# Patient Record
Sex: MALE | Race: WHITE | NOT HISPANIC OR LATINO | Employment: FULL TIME | URBAN - NONMETROPOLITAN AREA
[De-identification: names, ages, dates, MRNs, and addresses within clinical notes are randomized per-mention and may not be internally consistent; named-entity substitution may affect disease eponyms.]

---

## 2021-06-21 ENCOUNTER — OFFICE VISIT (OUTPATIENT)
Dept: FAMILY MEDICINE | Facility: CLINIC | Age: 56
End: 2021-06-21
Payer: OTHER GOVERNMENT

## 2021-06-21 VITALS
HEIGHT: 69 IN | OXYGEN SATURATION: 98 % | BODY MASS INDEX: 33.53 KG/M2 | DIASTOLIC BLOOD PRESSURE: 80 MMHG | TEMPERATURE: 98 F | WEIGHT: 226.38 LBS | SYSTOLIC BLOOD PRESSURE: 120 MMHG | HEART RATE: 88 BPM | RESPIRATION RATE: 16 BRPM

## 2021-06-21 DIAGNOSIS — I10 ESSENTIAL HYPERTENSION, BENIGN: ICD-10-CM

## 2021-06-21 DIAGNOSIS — E78.5 HYPERLIPIDEMIA ASSOCIATED WITH TYPE 2 DIABETES MELLITUS: ICD-10-CM

## 2021-06-21 DIAGNOSIS — Z79.4 TYPE 2 DIABETES MELLITUS WITHOUT COMPLICATION, WITH LONG-TERM CURRENT USE OF INSULIN: Primary | ICD-10-CM

## 2021-06-21 DIAGNOSIS — E11.9 TYPE 2 DIABETES MELLITUS WITHOUT COMPLICATION, WITH LONG-TERM CURRENT USE OF INSULIN: Primary | ICD-10-CM

## 2021-06-21 DIAGNOSIS — E11.69 HYPERLIPIDEMIA ASSOCIATED WITH TYPE 2 DIABETES MELLITUS: ICD-10-CM

## 2021-06-21 LAB
ALBUMIN SERPL BCP-MCNC: 3.5 G/DL (ref 3.5–5)
ALBUMIN/GLOB SERPL: 0.8 {RATIO}
ALP SERPL-CCNC: 88 U/L (ref 45–115)
ALT SERPL W P-5'-P-CCNC: 44 U/L (ref 16–61)
ANION GAP SERPL CALCULATED.3IONS-SCNC: 11 MMOL/L (ref 7–16)
AST SERPL W P-5'-P-CCNC: 24 U/L (ref 15–37)
BASOPHILS # BLD AUTO: 0.03 K/UL (ref 0–0.2)
BASOPHILS NFR BLD AUTO: 0.5 % (ref 0–1)
BILIRUB SERPL-MCNC: 1.2 MG/DL (ref 0–1.2)
BUN SERPL-MCNC: 15 MG/DL (ref 7–18)
BUN/CREAT SERPL: 17 (ref 6–20)
CALCIUM SERPL-MCNC: 8.6 MG/DL (ref 8.5–10.1)
CHLORIDE SERPL-SCNC: 103 MMOL/L (ref 98–107)
CHOLEST SERPL-MCNC: 137 MG/DL (ref 0–200)
CHOLEST/HDLC SERPL: 3.7 {RATIO}
CO2 SERPL-SCNC: 26 MMOL/L (ref 21–32)
CREAT SERPL-MCNC: 0.86 MG/DL (ref 0.7–1.3)
DIFFERENTIAL METHOD BLD: ABNORMAL
EOSINOPHIL # BLD AUTO: 0.06 K/UL (ref 0–0.5)
EOSINOPHIL NFR BLD AUTO: 1 % (ref 1–4)
ERYTHROCYTE [DISTWIDTH] IN BLOOD BY AUTOMATED COUNT: 13.2 % (ref 11.5–14.5)
EST. AVERAGE GLUCOSE BLD GHB EST-MCNC: 250 MG/DL
GLOBULIN SER-MCNC: 4.6 G/DL (ref 2–4)
GLUCOSE SERPL-MCNC: 347 MG/DL (ref 74–106)
HBA1C MFR BLD HPLC: 10.1 % (ref 4.5–6.6)
HCT VFR BLD AUTO: 47.3 % (ref 40–54)
HDLC SERPL-MCNC: 37 MG/DL (ref 40–60)
HGB BLD-MCNC: 15.8 G/DL (ref 13.5–18)
IMM GRANULOCYTES # BLD AUTO: 0.01 K/UL (ref 0–0.04)
IMM GRANULOCYTES NFR BLD: 0.2 % (ref 0–0.4)
LDLC SERPL CALC-MCNC: 62 MG/DL
LDLC/HDLC SERPL: 1.7 {RATIO}
LYMPHOCYTES # BLD AUTO: 1.91 K/UL (ref 1–4.8)
LYMPHOCYTES NFR BLD AUTO: 30.4 % (ref 27–41)
MCH RBC QN AUTO: 29 PG (ref 27–31)
MCHC RBC AUTO-ENTMCNC: 33.4 G/DL (ref 32–36)
MCV RBC AUTO: 86.9 FL (ref 80–96)
MONOCYTES # BLD AUTO: 0.46 K/UL (ref 0–0.8)
MONOCYTES NFR BLD AUTO: 7.3 % (ref 2–6)
MPC BLD CALC-MCNC: 12.9 FL (ref 9.4–12.4)
NEUTROPHILS # BLD AUTO: 3.82 K/UL (ref 1.8–7.7)
NEUTROPHILS NFR BLD AUTO: 60.6 % (ref 53–65)
NONHDLC SERPL-MCNC: 100 MG/DL
NRBC # BLD AUTO: 0 X10E3/UL
NRBC, AUTO (.00): 0 %
PLATELET # BLD AUTO: 162 K/UL (ref 150–400)
POTASSIUM SERPL-SCNC: 3.9 MMOL/L (ref 3.5–5.1)
PROT SERPL-MCNC: 8.1 G/DL (ref 6.4–8.2)
RBC # BLD AUTO: 5.44 M/UL (ref 4.6–6.2)
SODIUM SERPL-SCNC: 136 MMOL/L (ref 136–145)
TRIGL SERPL-MCNC: 191 MG/DL (ref 35–150)
VLDLC SERPL-MCNC: 38 MG/DL
WBC # BLD AUTO: 6.29 K/UL (ref 4.5–11)

## 2021-06-21 PROCEDURE — 99213 OFFICE O/P EST LOW 20 MIN: CPT | Mod: ,,, | Performed by: NURSE PRACTITIONER

## 2021-06-21 PROCEDURE — 85025 COMPLETE CBC W/AUTO DIFF WBC: CPT | Mod: ,,, | Performed by: CLINICAL MEDICAL LABORATORY

## 2021-06-21 PROCEDURE — 85025 CBC WITH DIFFERENTIAL: ICD-10-PCS | Mod: ,,, | Performed by: CLINICAL MEDICAL LABORATORY

## 2021-06-21 PROCEDURE — 80053 COMPREHEN METABOLIC PANEL: CPT | Mod: ,,, | Performed by: CLINICAL MEDICAL LABORATORY

## 2021-06-21 PROCEDURE — 83036 HEMOGLOBIN GLYCOSYLATED A1C: CPT | Mod: ,,, | Performed by: CLINICAL MEDICAL LABORATORY

## 2021-06-21 PROCEDURE — 80061 LIPID PANEL: ICD-10-PCS | Mod: ,,, | Performed by: CLINICAL MEDICAL LABORATORY

## 2021-06-21 PROCEDURE — 83036 HEMOGLOBIN A1C: ICD-10-PCS | Mod: ,,, | Performed by: CLINICAL MEDICAL LABORATORY

## 2021-06-21 PROCEDURE — 99213 PR OFFICE/OUTPT VISIT, EST, LEVL III, 20-29 MIN: ICD-10-PCS | Mod: ,,, | Performed by: NURSE PRACTITIONER

## 2021-06-21 PROCEDURE — 80053 COMPREHENSIVE METABOLIC PANEL: ICD-10-PCS | Mod: ,,, | Performed by: CLINICAL MEDICAL LABORATORY

## 2021-06-21 PROCEDURE — 80061 LIPID PANEL: CPT | Mod: ,,, | Performed by: CLINICAL MEDICAL LABORATORY

## 2021-06-21 RX ORDER — BACLOFEN 20 MG/1
20 TABLET ORAL 3 TIMES DAILY
COMMUNITY
Start: 2021-03-15 | End: 2022-06-27

## 2021-06-21 RX ORDER — AMLODIPINE BESYLATE 10 MG/1
0.5 TABLET ORAL DAILY
COMMUNITY
End: 2022-06-15 | Stop reason: SDUPTHER

## 2021-06-21 RX ORDER — ATORVASTATIN CALCIUM 80 MG/1
80 TABLET, FILM COATED ORAL DAILY
COMMUNITY
End: 2022-06-15 | Stop reason: SDUPTHER

## 2021-06-21 RX ORDER — LISINOPRIL 40 MG/1
40 TABLET ORAL DAILY
COMMUNITY
End: 2022-06-15 | Stop reason: SDUPTHER

## 2021-06-21 RX ORDER — GLIMEPIRIDE 4 MG/1
2 TABLET ORAL DAILY
COMMUNITY
Start: 2021-04-27 | End: 2021-06-21 | Stop reason: SDUPTHER

## 2021-06-21 RX ORDER — GLIMEPIRIDE 4 MG/1
8 TABLET ORAL DAILY
Qty: 90 TABLET | Refills: 1 | Status: SHIPPED | OUTPATIENT
Start: 2021-06-21 | End: 2021-09-20 | Stop reason: SDUPTHER

## 2021-06-21 RX ORDER — INSULIN GLARGINE 100 [IU]/ML
45 INJECTION, SOLUTION SUBCUTANEOUS NIGHTLY
COMMUNITY
End: 2022-06-15 | Stop reason: SDUPTHER

## 2021-06-21 RX ORDER — NAPROXEN SODIUM 220 MG/1
81 TABLET, FILM COATED ORAL DAILY
COMMUNITY
End: 2022-06-15 | Stop reason: SDUPTHER

## 2021-06-21 RX ORDER — OMEPRAZOLE 20 MG/1
20 CAPSULE, DELAYED RELEASE ORAL DAILY
COMMUNITY
End: 2022-06-15 | Stop reason: SDUPTHER

## 2021-06-21 RX ORDER — VIT C/E/ZN/COPPR/LUTEIN/ZEAXAN 250MG-90MG
1000 CAPSULE ORAL DAILY
COMMUNITY
End: 2022-06-15 | Stop reason: SDUPTHER

## 2021-06-21 RX ORDER — LEVOTHYROXINE SODIUM 200 UG/1
200 TABLET ORAL
COMMUNITY
End: 2022-06-15 | Stop reason: SDUPTHER

## 2021-06-21 RX ORDER — DICLOFENAC SODIUM 50 MG/1
50 TABLET, DELAYED RELEASE ORAL 2 TIMES DAILY PRN
COMMUNITY
Start: 2021-03-15 | End: 2022-06-27

## 2021-06-21 RX ORDER — OMEGA-3-ACID ETHYL ESTERS 1 G/1
2 CAPSULE, LIQUID FILLED ORAL 2 TIMES DAILY
COMMUNITY
End: 2022-06-15 | Stop reason: SDUPTHER

## 2021-06-21 RX ORDER — TRAMADOL HYDROCHLORIDE 50 MG/1
50 TABLET ORAL DAILY
COMMUNITY
Start: 2021-04-21 | End: 2022-06-27

## 2021-06-21 RX ORDER — LANCETS 33 GAUGE
EACH MISCELLANEOUS
COMMUNITY
End: 2022-09-12 | Stop reason: SDUPTHER

## 2021-06-21 RX ORDER — GABAPENTIN 300 MG/1
600 CAPSULE ORAL 3 TIMES DAILY
COMMUNITY
End: 2022-06-15 | Stop reason: SDUPTHER

## 2021-08-16 ENCOUNTER — OFFICE VISIT (OUTPATIENT)
Dept: FAMILY MEDICINE | Facility: CLINIC | Age: 56
End: 2021-08-16
Payer: OTHER GOVERNMENT

## 2021-08-16 VITALS
HEART RATE: 66 BPM | HEIGHT: 69 IN | RESPIRATION RATE: 20 BRPM | DIASTOLIC BLOOD PRESSURE: 88 MMHG | WEIGHT: 228.38 LBS | SYSTOLIC BLOOD PRESSURE: 128 MMHG | BODY MASS INDEX: 33.83 KG/M2 | OXYGEN SATURATION: 98 % | TEMPERATURE: 97 F

## 2021-08-16 DIAGNOSIS — H61.20 IMPACTED CERUMEN, UNSPECIFIED LATERALITY: Primary | ICD-10-CM

## 2021-08-16 PROCEDURE — 99213 OFFICE O/P EST LOW 20 MIN: CPT | Mod: ,,, | Performed by: FAMILY MEDICINE

## 2021-08-16 PROCEDURE — 99213 PR OFFICE/OUTPT VISIT, EST, LEVL III, 20-29 MIN: ICD-10-PCS | Mod: ,,, | Performed by: FAMILY MEDICINE

## 2021-08-22 PROBLEM — H61.20 IMPACTED CERUMEN: Status: ACTIVE | Noted: 2021-08-22

## 2021-09-20 ENCOUNTER — OFFICE VISIT (OUTPATIENT)
Dept: FAMILY MEDICINE | Facility: CLINIC | Age: 56
End: 2021-09-20
Payer: OTHER GOVERNMENT

## 2021-09-20 VITALS
HEART RATE: 66 BPM | TEMPERATURE: 97 F | DIASTOLIC BLOOD PRESSURE: 80 MMHG | HEIGHT: 69 IN | RESPIRATION RATE: 18 BRPM | OXYGEN SATURATION: 96 % | SYSTOLIC BLOOD PRESSURE: 110 MMHG | WEIGHT: 227.19 LBS | BODY MASS INDEX: 33.65 KG/M2

## 2021-09-20 DIAGNOSIS — R06.02 SHORTNESS OF BREATH: ICD-10-CM

## 2021-09-20 DIAGNOSIS — I10 ESSENTIAL HYPERTENSION, BENIGN: ICD-10-CM

## 2021-09-20 DIAGNOSIS — E78.5 HYPERLIPIDEMIA ASSOCIATED WITH TYPE 2 DIABETES MELLITUS: ICD-10-CM

## 2021-09-20 DIAGNOSIS — E11.9 TYPE 2 DIABETES MELLITUS WITHOUT COMPLICATION, WITH LONG-TERM CURRENT USE OF INSULIN: Primary | ICD-10-CM

## 2021-09-20 DIAGNOSIS — Z12.11 SCREENING FOR COLON CANCER: ICD-10-CM

## 2021-09-20 DIAGNOSIS — E11.69 HYPERLIPIDEMIA ASSOCIATED WITH TYPE 2 DIABETES MELLITUS: ICD-10-CM

## 2021-09-20 DIAGNOSIS — E03.9 HYPOTHYROIDISM, UNSPECIFIED TYPE: ICD-10-CM

## 2021-09-20 DIAGNOSIS — Z79.4 TYPE 2 DIABETES MELLITUS WITHOUT COMPLICATION, WITH LONG-TERM CURRENT USE OF INSULIN: Primary | ICD-10-CM

## 2021-09-20 LAB
BASOPHILS # BLD AUTO: 0.03 K/UL (ref 0–0.2)
BASOPHILS NFR BLD AUTO: 0.6 % (ref 0–1)
DIFFERENTIAL METHOD BLD: ABNORMAL
EOSINOPHIL # BLD AUTO: 0.06 K/UL (ref 0–0.5)
EOSINOPHIL NFR BLD AUTO: 1.1 % (ref 1–4)
ERYTHROCYTE [DISTWIDTH] IN BLOOD BY AUTOMATED COUNT: 12.8 % (ref 11.5–14.5)
EST. AVERAGE GLUCOSE BLD GHB EST-MCNC: 274 MG/DL
HBA1C MFR BLD HPLC: 10.8 % (ref 4.5–6.6)
HCT VFR BLD AUTO: 41 % (ref 40–54)
HGB BLD-MCNC: 14.2 G/DL (ref 13.5–18)
IMM GRANULOCYTES # BLD AUTO: 0.01 K/UL (ref 0–0.04)
IMM GRANULOCYTES NFR BLD: 0.2 % (ref 0–0.4)
LYMPHOCYTES # BLD AUTO: 1.8 K/UL (ref 1–4.8)
LYMPHOCYTES NFR BLD AUTO: 33.1 % (ref 27–41)
MCH RBC QN AUTO: 29.5 PG (ref 27–31)
MCHC RBC AUTO-ENTMCNC: 34.6 G/DL (ref 32–36)
MCV RBC AUTO: 85.1 FL (ref 80–96)
MONOCYTES # BLD AUTO: 0.53 K/UL (ref 0–0.8)
MONOCYTES NFR BLD AUTO: 9.8 % (ref 2–6)
MPC BLD CALC-MCNC: 13.2 FL (ref 9.4–12.4)
NEUTROPHILS # BLD AUTO: 3 K/UL (ref 1.8–7.7)
NEUTROPHILS NFR BLD AUTO: 55.2 % (ref 53–65)
NRBC # BLD AUTO: 0 X10E3/UL
NRBC, AUTO (.00): 0 %
PLATELET # BLD AUTO: 139 K/UL (ref 150–400)
PLATELET MORPHOLOGY: ABNORMAL
RBC # BLD AUTO: 4.82 M/UL (ref 4.6–6.2)
RBC MORPH BLD: NORMAL
WBC # BLD AUTO: 5.43 K/UL (ref 4.5–11)

## 2021-09-20 PROCEDURE — 82043 UR ALBUMIN QUANTITATIVE: CPT | Mod: ,,, | Performed by: CLINICAL MEDICAL LABORATORY

## 2021-09-20 PROCEDURE — 80061 LIPID PANEL: CPT | Mod: ,,, | Performed by: CLINICAL MEDICAL LABORATORY

## 2021-09-20 PROCEDURE — 93000 PR ELECTROCARDIOGRAM, COMPLETE: ICD-10-PCS | Mod: ,,, | Performed by: NURSE PRACTITIONER

## 2021-09-20 PROCEDURE — 93000 ELECTROCARDIOGRAM COMPLETE: CPT | Mod: ,,, | Performed by: NURSE PRACTITIONER

## 2021-09-20 PROCEDURE — 83036 HEMOGLOBIN GLYCOSYLATED A1C: CPT | Mod: ,,, | Performed by: CLINICAL MEDICAL LABORATORY

## 2021-09-20 PROCEDURE — 84443 TSH: ICD-10-PCS | Mod: ,,, | Performed by: CLINICAL MEDICAL LABORATORY

## 2021-09-20 PROCEDURE — 85025 COMPLETE CBC W/AUTO DIFF WBC: CPT | Mod: ,,, | Performed by: CLINICAL MEDICAL LABORATORY

## 2021-09-20 PROCEDURE — 80053 COMPREHEN METABOLIC PANEL: CPT | Mod: ,,, | Performed by: CLINICAL MEDICAL LABORATORY

## 2021-09-20 PROCEDURE — 80061 LIPID PANEL: ICD-10-PCS | Mod: ,,, | Performed by: CLINICAL MEDICAL LABORATORY

## 2021-09-20 PROCEDURE — 85025 CBC WITH DIFFERENTIAL: ICD-10-PCS | Mod: ,,, | Performed by: CLINICAL MEDICAL LABORATORY

## 2021-09-20 PROCEDURE — 80053 COMPREHENSIVE METABOLIC PANEL: ICD-10-PCS | Mod: ,,, | Performed by: CLINICAL MEDICAL LABORATORY

## 2021-09-20 PROCEDURE — 82570 ASSAY OF URINE CREATININE: CPT | Mod: ,,, | Performed by: CLINICAL MEDICAL LABORATORY

## 2021-09-20 PROCEDURE — 99214 OFFICE O/P EST MOD 30 MIN: CPT | Mod: 25,,, | Performed by: NURSE PRACTITIONER

## 2021-09-20 PROCEDURE — 84443 ASSAY THYROID STIM HORMONE: CPT | Mod: ,,, | Performed by: CLINICAL MEDICAL LABORATORY

## 2021-09-20 PROCEDURE — 99214 PR OFFICE/OUTPT VISIT, EST, LEVL IV, 30-39 MIN: ICD-10-PCS | Mod: 25,,, | Performed by: NURSE PRACTITIONER

## 2021-09-20 PROCEDURE — 82570 MICROALBUMIN / CREATININE RATIO URINE: ICD-10-PCS | Mod: ,,, | Performed by: CLINICAL MEDICAL LABORATORY

## 2021-09-20 PROCEDURE — 82043 MICROALBUMIN / CREATININE RATIO URINE: ICD-10-PCS | Mod: ,,, | Performed by: CLINICAL MEDICAL LABORATORY

## 2021-09-20 PROCEDURE — 83036 HEMOGLOBIN A1C: ICD-10-PCS | Mod: ,,, | Performed by: CLINICAL MEDICAL LABORATORY

## 2021-09-20 RX ORDER — GLIMEPIRIDE 4 MG/1
8 TABLET ORAL DAILY
Qty: 90 TABLET | Refills: 1 | Status: SHIPPED | OUTPATIENT
Start: 2021-09-20 | End: 2021-10-11

## 2021-09-21 LAB
ALBUMIN SERPL BCP-MCNC: 3.3 G/DL (ref 3.5–5)
ALBUMIN/GLOB SERPL: 0.8 {RATIO}
ALP SERPL-CCNC: 79 U/L (ref 45–115)
ALT SERPL W P-5'-P-CCNC: 48 U/L (ref 16–61)
ANION GAP SERPL CALCULATED.3IONS-SCNC: 9 MMOL/L (ref 7–16)
AST SERPL W P-5'-P-CCNC: 39 U/L (ref 15–37)
BILIRUB SERPL-MCNC: 1.7 MG/DL (ref 0–1.2)
BUN SERPL-MCNC: 21 MG/DL (ref 7–18)
BUN/CREAT SERPL: 28 (ref 6–20)
CALCIUM SERPL-MCNC: 9.2 MG/DL (ref 8.5–10.1)
CHLORIDE SERPL-SCNC: 103 MMOL/L (ref 98–107)
CHOLEST SERPL-MCNC: 91 MG/DL (ref 0–200)
CHOLEST/HDLC SERPL: 2.5 {RATIO}
CO2 SERPL-SCNC: 28 MMOL/L (ref 21–32)
CREAT SERPL-MCNC: 0.75 MG/DL (ref 0.7–1.3)
GLOBULIN SER-MCNC: 4 G/DL (ref 2–4)
GLUCOSE SERPL-MCNC: 230 MG/DL (ref 74–106)
HDLC SERPL-MCNC: 36 MG/DL (ref 40–60)
LDLC SERPL CALC-MCNC: 40 MG/DL
LDLC/HDLC SERPL: 1.1 {RATIO}
NONHDLC SERPL-MCNC: 55 MG/DL
POTASSIUM SERPL-SCNC: 3.7 MMOL/L (ref 3.5–5.1)
PROT SERPL-MCNC: 7.3 G/DL (ref 6.4–8.2)
SODIUM SERPL-SCNC: 136 MMOL/L (ref 136–145)
TRIGL SERPL-MCNC: 76 MG/DL (ref 35–150)
TSH SERPL DL<=0.005 MIU/L-ACNC: 0.24 UIU/ML (ref 0.36–3.74)
VLDLC SERPL-MCNC: 15 MG/DL

## 2021-09-22 LAB
CREAT UR-MCNC: 138 MG/DL (ref 39–259)
MICROALBUMIN UR-MCNC: 1.6 MG/DL (ref 0–2.8)
MICROALBUMIN/CREAT RATIO PNL UR: 11.6 MG/G (ref 0–30)

## 2021-09-27 RX ORDER — EMPAGLIFLOZIN 25 MG/1
25 TABLET, FILM COATED ORAL DAILY
Qty: 30 TABLET | Refills: 2 | Status: SHIPPED | OUTPATIENT
Start: 2021-09-27 | End: 2022-06-15 | Stop reason: SDUPTHER

## 2021-10-08 DIAGNOSIS — Z79.4 TYPE 2 DIABETES MELLITUS WITHOUT COMPLICATION, WITH LONG-TERM CURRENT USE OF INSULIN: ICD-10-CM

## 2021-10-08 DIAGNOSIS — E11.9 TYPE 2 DIABETES MELLITUS WITHOUT COMPLICATION, WITH LONG-TERM CURRENT USE OF INSULIN: ICD-10-CM

## 2021-10-11 RX ORDER — GLIMEPIRIDE 4 MG/1
4 TABLET ORAL
Qty: 90 TABLET | Refills: 0 | Status: SHIPPED | OUTPATIENT
Start: 2021-10-11 | End: 2021-10-11 | Stop reason: SDUPTHER

## 2021-10-11 RX ORDER — GLIMEPIRIDE 4 MG/1
8 TABLET ORAL
Qty: 180 TABLET | Refills: 0 | Status: SHIPPED | OUTPATIENT
Start: 2021-10-11 | End: 2021-12-28

## 2021-12-28 DIAGNOSIS — E11.9 TYPE 2 DIABETES MELLITUS WITHOUT COMPLICATION, WITH LONG-TERM CURRENT USE OF INSULIN: ICD-10-CM

## 2021-12-28 DIAGNOSIS — Z79.4 TYPE 2 DIABETES MELLITUS WITHOUT COMPLICATION, WITH LONG-TERM CURRENT USE OF INSULIN: ICD-10-CM

## 2021-12-28 RX ORDER — GLIMEPIRIDE 4 MG/1
TABLET ORAL
Qty: 180 TABLET | Refills: 0 | Status: SHIPPED | OUTPATIENT
Start: 2021-12-28 | End: 2022-03-17

## 2022-03-07 ENCOUNTER — OFFICE VISIT (OUTPATIENT)
Dept: FAMILY MEDICINE | Facility: CLINIC | Age: 57
End: 2022-03-07
Payer: OTHER GOVERNMENT

## 2022-03-07 VITALS
OXYGEN SATURATION: 96 % | HEIGHT: 69 IN | SYSTOLIC BLOOD PRESSURE: 110 MMHG | WEIGHT: 227 LBS | TEMPERATURE: 97 F | BODY MASS INDEX: 33.62 KG/M2 | DIASTOLIC BLOOD PRESSURE: 60 MMHG | HEART RATE: 92 BPM | RESPIRATION RATE: 20 BRPM

## 2022-03-07 DIAGNOSIS — R11.2 NAUSEA AND VOMITING, INTRACTABILITY OF VOMITING NOT SPECIFIED, UNSPECIFIED VOMITING TYPE: ICD-10-CM

## 2022-03-07 DIAGNOSIS — K52.9 GASTROENTERITIS: Primary | ICD-10-CM

## 2022-03-07 DIAGNOSIS — R19.7 DIARRHEA, UNSPECIFIED TYPE: ICD-10-CM

## 2022-03-07 LAB
CTP QC/QA: YES
FLUAV AG NPH QL: NEGATIVE
FLUBV AG NPH QL: NEGATIVE
SARS-COV-2 AG RESP QL IA.RAPID: NEGATIVE

## 2022-03-07 PROCEDURE — 87428 POCT SARS-COV2 (COVID) WITH FLU ANTIGEN: ICD-10-PCS | Mod: QW,,, | Performed by: NURSE PRACTITIONER

## 2022-03-07 PROCEDURE — 99213 PR OFFICE/OUTPT VISIT, EST, LEVL III, 20-29 MIN: ICD-10-PCS | Mod: ,,, | Performed by: NURSE PRACTITIONER

## 2022-03-07 PROCEDURE — 99213 OFFICE O/P EST LOW 20 MIN: CPT | Mod: ,,, | Performed by: NURSE PRACTITIONER

## 2022-03-07 PROCEDURE — 87428 SARSCOV & INF VIR A&B AG IA: CPT | Mod: QW,,, | Performed by: NURSE PRACTITIONER

## 2022-03-07 RX ORDER — ONDANSETRON 4 MG/1
4 TABLET, ORALLY DISINTEGRATING ORAL EVERY 8 HOURS PRN
Qty: 9 TABLET | Refills: 0 | Status: SHIPPED | OUTPATIENT
Start: 2022-03-07 | End: 2022-06-15

## 2022-03-07 RX ORDER — DIPHENOXYLATE HYDROCHLORIDE AND ATROPINE SULFATE 2.5; .025 MG/1; MG/1
1 TABLET ORAL 4 TIMES DAILY PRN
Qty: 9 TABLET | Refills: 0 | Status: SHIPPED | OUTPATIENT
Start: 2022-03-07 | End: 2022-03-17

## 2022-03-07 RX ORDER — CIPROFLOXACIN 500 MG/1
500 TABLET ORAL EVERY 12 HOURS
Qty: 14 TABLET | Refills: 0 | Status: SHIPPED | OUTPATIENT
Start: 2022-03-07 | End: 2022-06-15

## 2022-03-07 NOTE — LETTER
March 7, 2022      [facility]  [address]  [place] [address]  Phone: [phone]  Fax: [phone]       Patient: James Mcclellan   YOB: 1965  Date of Visit: 03/07/2022    To Whom It May Concern:    Solange Mcclellan  was at [facility] on 03/04/2022. The patient may return to work/school on 03/09/2022 with no restrictions. If you have any questions or concerns, or if I can be of further assistance, please do not hesitate to contact me.      Sincerely,    ANDREA Vilchis

## 2022-03-07 NOTE — PROGRESS NOTES
ANDREA Sommers   [facility]  [address]  [place] [address]     PATIENT NAME: James Mcclellan  : 1965  DATE: 3/7/22  MRN: [id number]      Billing Provider: ANDREA Sommers  Level of Service: [place] OFFICE/OUTPT VISIT, EST, LEVL III, 20-29 MIN  Patient PCP Information     Provider PCP Type    ANDREA Sommers General          Reason for Visit / Chief Complaint: Diarrhea (Started last night.), Nausea (Nausea and vomiting started Saturday night), and Headache (Started last week.  Denies fever or loss of taste or smell.)       Update PCP  Update Chief Complaint         History of Present Illness / Problem Focused Workflow     James Mcclellan presents to the clinic c/o N/V/D and headache that started 4-5 days ago. Denies fever, chills, muscle aches or SOB. Sat. night he ate left over chicken fried steak and gravy and symptoms started later that night.  Has been drinking gatorade zero sugar but has not checked blood sugar; hx of DM.      Review of Systems     Review of Systems   Constitutional: Positive for appetite change. Negative for fever.   HENT: Negative for sore throat.    Respiratory: Negative for shortness of breath.    Cardiovascular: Negative for chest pain.   Gastrointestinal: Positive for diarrhea, nausea and vomiting. Negative for abdominal pain and blood in stool.   Endocrine: Negative for polydipsia, polyphagia and polyuria.   Musculoskeletal: Negative for back pain and myalgias.   Neurological: Negative for weakness and headaches.        Medical / Social / Family History     Past Medical History:   Diagnosis Date    Bell's palsy 10/15/2018    Left Side of Face    Degenerative cervical disc     GERD (gastroesophageal reflux disease)     History of COVID-19 2021    Hypertension     Hypothyroidism     Neck pain     Type 2 diabetes mellitus        Past Surgical History:   Procedure Laterality Date    ANTERIOR CRUCIATE LIGAMENT REPAIR       CHOLECYSTECTOMY      SINUS SURGERY         Social History    reports that he has never smoked. He has never used smokeless tobacco. He reports that he does not drink alcohol and does not use drugs.    Family History  MrTeresa's family history includes Diabetes in his father and sister; Heart disease in his father; Hypertension in his brother, father, mother, and sister.    Medications and Allergies     Medications  Outpatient Medications Marked as Taking for the 3/7/22 encounter (Office Visit) with NADREA Vilchis   Medication Sig Dispense Refill    amLODIPine (NORVASC) 10 MG tablet Take 0.5 mg by mouth once daily.      aspirin 81 MG Chew Take 81 mg by mouth once daily.      atorvastatin (LIPITOR) 80 MG tablet Take 80 mg by mouth once daily.      baclofen (LIORESAL) 20 MG tablet Take 20 mg by mouth 3 (three) times daily.      cholecalciferol, vitamin D3, (VITAMIN D3) 25 mcg (1,000 unit) capsule Take 1,000 Units by mouth once daily.      diclofenac (VOLTAREN) 50 MG EC tablet Take 50 mg by mouth 2 (two) times daily as needed.      empagliflozin (JARDIANCE) 25 mg tablet Take 1 tablet (25 mg total) by mouth once daily. 30 tablet 2    gabapentin (NEURONTIN) 300 MG capsule Take 600 mg by mouth 3 (three) times daily.      glimepiride (AMARYL) 4 MG tablet TAKE 2 TABLETS DAILY WITH BREAKFAST 180 tablet 0    insulin (LANTUS SOLOSTAR U-100 INSULIN) glargine 100 units/mL (3mL) SubQ pen Inject 45 Units into the skin every evening.       lancets 33 gauge Misc Use as directed to check blood glucose      levothyroxine (SYNTHROID) 200 MCG tablet Take 200 mcg by mouth before breakfast.      lisinopriL (PRINIVIL,ZESTRIL) 40 MG tablet Take 40 mg by mouth once daily.      omega-3 acid ethyl esters (LOVAZA) 1 gram capsule Take 2 g by mouth 2 (two) times daily.      omeprazole (PRILOSEC) 20 MG capsule Take 20 mg by mouth once daily.      traMADoL (ULTRAM) 50 mg tablet Take 50 mg by mouth once daily.    "      Allergies  Review of patient's allergies indicates:  No Known Allergies    Physical Examination     Vitals:    03/07/22 1312   BP: 110/60   BP Location: Right arm   Patient Position: Sitting   BP Method: Large (Manual)   Pulse: 92   Resp: 20   Temp: 96.8 °F (36 °C)   TempSrc: Temporal   SpO2: 96%   Weight: 103 kg (227 lb)   Height: 5' 9" (1.753 m)      Physical Exam  Constitutional:       Appearance: Normal appearance.      Comments: Appears to not feel well   HENT:      Head: Normocephalic.      Right Ear: Hearing and ear canal normal. No tenderness. Tympanic membrane is not injected.      Left Ear: Hearing and ear canal normal. No tenderness. Tympanic membrane is not injected.      Nose: No nasal deformity.      Right Turbinates: Not swollen.      Left Turbinates: Not swollen.      Mouth/Throat:      Lips: Pink.      Mouth: Mucous membranes are moist.      Pharynx: No oropharyngeal exudate or posterior oropharyngeal erythema.      Tonsils: No tonsillar exudate.   Eyes:      General: Lids are normal. No allergic shiner.        Right eye: No discharge.         Left eye: No discharge.      Conjunctiva/sclera: Conjunctivae normal.      Right eye: Right conjunctiva is not injected.      Left eye: Left conjunctiva is not injected.      Pupils: Pupils are equal, round, and reactive to light.   Neck:      Trachea: Trachea normal.   Cardiovascular:      Rate and Rhythm: Normal rate and regular rhythm.      Pulses: Normal pulses.      Heart sounds: Normal heart sounds.   Pulmonary:      Effort: Pulmonary effort is normal.      Breath sounds: Normal breath sounds.   Abdominal:      General: Bowel sounds are increased.      Palpations: Abdomen is soft. There is no mass.      Tenderness: There is no abdominal tenderness. There is no guarding or rebound.   Musculoskeletal:         General: Normal range of motion.      Cervical back: Neck supple.   Lymphadenopathy:      Cervical: No cervical adenopathy.   Skin:     " General: Skin is warm and dry.      Coloration: Skin is not jaundiced or pale.   Neurological:      Mental Status: He is alert and oriented to person, place, and time.   Psychiatric:         Behavior: Behavior normal.          Assessment and Plan (including Health Maintenance)      Problem List  Smart Sets  Document Outside HM   :      Health Maintenance Due   Topic Date Due    Hepatitis C Screening  Never done    TETANUS VACCINE  Never done    Colorectal Cancer Screening  Never done    Eye Exam  01/05/2016    Hemoglobin A1c  12/20/2021       Problem List Items Addressed This Visit    None     Visit Diagnoses     Gastroenteritis    -  Primary    Proably D/T food; continue clear liquids and will treat with cipro, zofran as needed and lomotil as needed. Check blood sugar daily. Increase fluids    Nausea and vomiting, intractability of vomiting not specified, unspecified vomiting type        COVID and influenza negative    Relevant Orders    POCT SARS-COV2 (COVID) with Flu Antigen (Completed)    Diarrhea, unspecified type        Relevant Orders    POCT SARS-COV2 (COVID) with Flu Antigen (Completed)        Gastroenteritis  Comments:  Proably D/T food; continue clear liquids and will treat with cipro, zofran as needed and lomotil as needed. Check blood sugar daily. Increase fluids    Nausea and vomiting, intractability of vomiting not specified, unspecified vomiting type  Comments:  COVID and influenza negative  Orders:  -     POCT SARS-COV2 (COVID) with Flu Antigen    Diarrhea, unspecified type  -     POCT SARS-COV2 (COVID) with Flu Antigen    Other orders  -     ciprofloxacin HCl (CIPRO) 500 MG tablet; Take 1 tablet (500 mg total) by mouth every 12 (twelve) hours.  Dispense: 14 tablet; Refill: 0  -     ondansetron (ZOFRAN-ODT) 4 MG TbDL; Take 1 tablet (4 mg total) by mouth every 8 (eight) hours as needed (nausea/vomiting).  Dispense: 9 tablet; Refill: 0  -     diphenoxylate-atropine 2.5-0.025 mg (LOMOTIL)  2.5-0.025 mg per tablet; Take 1 tablet by mouth 4 (four) times daily as needed for Diarrhea.  Dispense: 9 tablet; Refill: 0       Health Maintenance Topics with due status: Not Due       Topic Last Completion Date    Pneumococcal Vaccines (Age 0-64) 11/03/2014    Low Dose Statin 09/20/2021    Diabetes Urine Screening 09/20/2021    Foot Exam 09/20/2021    Lipid Panel 09/20/2021       Procedures          Follow up if symptoms worsen or fail to improve in 48 hrs or go to ER.     Signature:  ANDREA Sommers    Date of encounter: 3/7/22

## 2022-03-16 DIAGNOSIS — Z79.4 TYPE 2 DIABETES MELLITUS WITHOUT COMPLICATION, WITH LONG-TERM CURRENT USE OF INSULIN: ICD-10-CM

## 2022-03-16 DIAGNOSIS — E11.9 TYPE 2 DIABETES MELLITUS WITHOUT COMPLICATION, WITH LONG-TERM CURRENT USE OF INSULIN: ICD-10-CM

## 2022-03-17 RX ORDER — GLIMEPIRIDE 4 MG/1
TABLET ORAL
Qty: 180 TABLET | Refills: 0 | Status: SHIPPED | OUTPATIENT
Start: 2022-03-17 | End: 2022-06-13

## 2022-06-11 DIAGNOSIS — E11.9 TYPE 2 DIABETES MELLITUS WITHOUT COMPLICATION, WITH LONG-TERM CURRENT USE OF INSULIN: ICD-10-CM

## 2022-06-11 DIAGNOSIS — Z79.4 TYPE 2 DIABETES MELLITUS WITHOUT COMPLICATION, WITH LONG-TERM CURRENT USE OF INSULIN: ICD-10-CM

## 2022-06-13 RX ORDER — GLIMEPIRIDE 4 MG/1
TABLET ORAL
Qty: 180 TABLET | Refills: 0 | Status: SHIPPED | OUTPATIENT
Start: 2022-06-13 | End: 2022-06-15 | Stop reason: SDUPTHER

## 2022-06-15 ENCOUNTER — OFFICE VISIT (OUTPATIENT)
Dept: FAMILY MEDICINE | Facility: CLINIC | Age: 57
End: 2022-06-15
Payer: OTHER GOVERNMENT

## 2022-06-15 VITALS
DIASTOLIC BLOOD PRESSURE: 62 MMHG | HEART RATE: 68 BPM | TEMPERATURE: 98 F | RESPIRATION RATE: 20 BRPM | WEIGHT: 218.81 LBS | OXYGEN SATURATION: 98 % | BODY MASS INDEX: 32.41 KG/M2 | HEIGHT: 69 IN | SYSTOLIC BLOOD PRESSURE: 120 MMHG

## 2022-06-15 DIAGNOSIS — Z79.4 TYPE 2 DIABETES MELLITUS WITH DIABETIC POLYNEUROPATHY, WITH LONG-TERM CURRENT USE OF INSULIN: ICD-10-CM

## 2022-06-15 DIAGNOSIS — E03.9 HYPOTHYROIDISM, UNSPECIFIED TYPE: ICD-10-CM

## 2022-06-15 DIAGNOSIS — E11.69 HYPERLIPIDEMIA ASSOCIATED WITH TYPE 2 DIABETES MELLITUS: ICD-10-CM

## 2022-06-15 DIAGNOSIS — I10 ESSENTIAL HYPERTENSION, BENIGN: Primary | ICD-10-CM

## 2022-06-15 DIAGNOSIS — E11.42 TYPE 2 DIABETES MELLITUS WITH DIABETIC POLYNEUROPATHY, WITH LONG-TERM CURRENT USE OF INSULIN: ICD-10-CM

## 2022-06-15 DIAGNOSIS — E55.9 VITAMIN D DEFICIENCY: ICD-10-CM

## 2022-06-15 DIAGNOSIS — E78.5 HYPERLIPIDEMIA ASSOCIATED WITH TYPE 2 DIABETES MELLITUS: ICD-10-CM

## 2022-06-15 DIAGNOSIS — Z12.11 SCREENING FOR COLON CANCER: ICD-10-CM

## 2022-06-15 DIAGNOSIS — K21.9 GASTROESOPHAGEAL REFLUX DISEASE, UNSPECIFIED WHETHER ESOPHAGITIS PRESENT: ICD-10-CM

## 2022-06-15 DIAGNOSIS — M77.32 CALCANEAL SPUR OF LEFT FOOT: ICD-10-CM

## 2022-06-15 PROBLEM — M77.30 CALCANEAL SPUR: Status: ACTIVE | Noted: 2022-06-15

## 2022-06-15 PROBLEM — G47.33 OSA (OBSTRUCTIVE SLEEP APNEA): Status: ACTIVE | Noted: 2022-06-15

## 2022-06-15 PROBLEM — H61.20 IMPACTED CERUMEN: Status: RESOLVED | Noted: 2021-08-22 | Resolved: 2022-06-15

## 2022-06-15 PROBLEM — G51.0 BELL'S PALSY: Status: RESOLVED | Noted: 2018-10-15 | Resolved: 2022-06-15

## 2022-06-15 LAB
ALBUMIN SERPL BCP-MCNC: 3.4 G/DL (ref 3.5–5)
ALBUMIN/GLOB SERPL: 0.9 {RATIO}
ALP SERPL-CCNC: 81 U/L (ref 45–115)
ALT SERPL W P-5'-P-CCNC: 43 U/L (ref 16–61)
ANION GAP SERPL CALCULATED.3IONS-SCNC: 10 MMOL/L (ref 7–16)
AST SERPL W P-5'-P-CCNC: 27 U/L (ref 15–37)
BASOPHILS # BLD AUTO: 0.03 K/UL (ref 0–0.2)
BASOPHILS NFR BLD AUTO: 0.5 % (ref 0–1)
BILIRUB SERPL-MCNC: 2.3 MG/DL (ref 0–1.2)
BUN SERPL-MCNC: 9 MG/DL (ref 7–18)
BUN/CREAT SERPL: 13 (ref 6–20)
CALCIUM SERPL-MCNC: 8.1 MG/DL (ref 8.5–10.1)
CHLORIDE SERPL-SCNC: 104 MMOL/L (ref 98–107)
CHOLEST SERPL-MCNC: 112 MG/DL (ref 0–200)
CHOLEST/HDLC SERPL: 3.2 {RATIO}
CO2 SERPL-SCNC: 27 MMOL/L (ref 21–32)
CREAT SERPL-MCNC: 0.68 MG/DL (ref 0.7–1.3)
DIFFERENTIAL METHOD BLD: ABNORMAL
EOSINOPHIL # BLD AUTO: 0.09 K/UL (ref 0–0.5)
EOSINOPHIL NFR BLD AUTO: 1.5 % (ref 1–4)
ERYTHROCYTE [DISTWIDTH] IN BLOOD BY AUTOMATED COUNT: 13.1 % (ref 11.5–14.5)
EST. AVERAGE GLUCOSE BLD GHB EST-MCNC: 290 MG/DL
GLOBULIN SER-MCNC: 3.6 G/DL (ref 2–4)
GLUCOSE SERPL-MCNC: 261 MG/DL (ref 74–106)
HBA1C MFR BLD HPLC: 11.3 % (ref 4.5–6.6)
HCT VFR BLD AUTO: 42.3 % (ref 40–54)
HDLC SERPL-MCNC: 35 MG/DL (ref 40–60)
HGB BLD-MCNC: 14.2 G/DL (ref 13.5–18)
IMM GRANULOCYTES # BLD AUTO: 0.02 K/UL (ref 0–0.04)
IMM GRANULOCYTES NFR BLD: 0.3 % (ref 0–0.4)
LDLC SERPL CALC-MCNC: 57 MG/DL
LDLC/HDLC SERPL: 1.6 {RATIO}
LYMPHOCYTES # BLD AUTO: 1.55 K/UL (ref 1–4.8)
LYMPHOCYTES NFR BLD AUTO: 26.5 % (ref 27–41)
MCH RBC QN AUTO: 29.5 PG (ref 27–31)
MCHC RBC AUTO-ENTMCNC: 33.6 G/DL (ref 32–36)
MCV RBC AUTO: 87.9 FL (ref 80–96)
MONOCYTES # BLD AUTO: 0.61 K/UL (ref 0–0.8)
MONOCYTES NFR BLD AUTO: 10.4 % (ref 2–6)
MPC BLD CALC-MCNC: 12.8 FL (ref 9.4–12.4)
NEUTROPHILS # BLD AUTO: 3.56 K/UL (ref 1.8–7.7)
NEUTROPHILS NFR BLD AUTO: 60.8 % (ref 53–65)
NONHDLC SERPL-MCNC: 77 MG/DL
NRBC # BLD AUTO: 0 X10E3/UL
NRBC, AUTO (.00): 0 %
PLATELET # BLD AUTO: 107 K/UL (ref 150–400)
POTASSIUM SERPL-SCNC: 4 MMOL/L (ref 3.5–5.1)
PROT SERPL-MCNC: 7 G/DL (ref 6.4–8.2)
RBC # BLD AUTO: 4.81 M/UL (ref 4.6–6.2)
SODIUM SERPL-SCNC: 137 MMOL/L (ref 136–145)
TRIGL SERPL-MCNC: 98 MG/DL (ref 35–150)
TSH SERPL DL<=0.005 MIU/L-ACNC: 0.09 UIU/ML (ref 0.36–3.74)
VLDLC SERPL-MCNC: 20 MG/DL
WBC # BLD AUTO: 5.86 K/UL (ref 4.5–11)

## 2022-06-15 PROCEDURE — 83036 HEMOGLOBIN GLYCOSYLATED A1C: CPT | Mod: ,,, | Performed by: CLINICAL MEDICAL LABORATORY

## 2022-06-15 PROCEDURE — 84443 TSH: ICD-10-PCS | Mod: ,,, | Performed by: CLINICAL MEDICAL LABORATORY

## 2022-06-15 PROCEDURE — 84443 ASSAY THYROID STIM HORMONE: CPT | Mod: ,,, | Performed by: CLINICAL MEDICAL LABORATORY

## 2022-06-15 PROCEDURE — 20550 NJX 1 TENDON SHEATH/LIGAMENT: CPT | Mod: LT,,, | Performed by: NURSE PRACTITIONER

## 2022-06-15 PROCEDURE — 83036 HEMOGLOBIN A1C: ICD-10-PCS | Mod: ,,, | Performed by: CLINICAL MEDICAL LABORATORY

## 2022-06-15 PROCEDURE — 80061 LIPID PANEL: ICD-10-PCS | Mod: ,,, | Performed by: CLINICAL MEDICAL LABORATORY

## 2022-06-15 PROCEDURE — 85025 CBC WITH DIFFERENTIAL: ICD-10-PCS | Mod: ,,, | Performed by: CLINICAL MEDICAL LABORATORY

## 2022-06-15 PROCEDURE — 20550 PR INJECT TENDON SHEATH/LIGAMENT: ICD-10-PCS | Mod: LT,,, | Performed by: NURSE PRACTITIONER

## 2022-06-15 PROCEDURE — 80061 LIPID PANEL: CPT | Mod: ,,, | Performed by: CLINICAL MEDICAL LABORATORY

## 2022-06-15 PROCEDURE — 85025 COMPLETE CBC W/AUTO DIFF WBC: CPT | Mod: ,,, | Performed by: CLINICAL MEDICAL LABORATORY

## 2022-06-15 PROCEDURE — 99214 PR OFFICE/OUTPT VISIT, EST, LEVL IV, 30-39 MIN: ICD-10-PCS | Mod: 25,,, | Performed by: NURSE PRACTITIONER

## 2022-06-15 PROCEDURE — 80053 COMPREHEN METABOLIC PANEL: CPT | Mod: ,,, | Performed by: CLINICAL MEDICAL LABORATORY

## 2022-06-15 PROCEDURE — 80053 COMPREHENSIVE METABOLIC PANEL: ICD-10-PCS | Mod: ,,, | Performed by: CLINICAL MEDICAL LABORATORY

## 2022-06-15 PROCEDURE — 99214 OFFICE O/P EST MOD 30 MIN: CPT | Mod: 25,,, | Performed by: NURSE PRACTITIONER

## 2022-06-15 RX ORDER — OMEGA-3-ACID ETHYL ESTERS 1 G/1
2 CAPSULE, LIQUID FILLED ORAL 2 TIMES DAILY
Qty: 360 CAPSULE | Refills: 0 | Status: SHIPPED | OUTPATIENT
Start: 2022-06-15 | End: 2022-06-27 | Stop reason: SDUPTHER

## 2022-06-15 RX ORDER — LISINOPRIL 40 MG/1
40 TABLET ORAL DAILY
Qty: 90 TABLET | Refills: 1 | Status: SHIPPED | OUTPATIENT
Start: 2022-06-15 | End: 2022-09-12 | Stop reason: SDUPTHER

## 2022-06-15 RX ORDER — INSULIN GLARGINE 100 [IU]/ML
45 INJECTION, SOLUTION SUBCUTANEOUS NIGHTLY
Qty: 3 EACH | Refills: 2 | Status: SHIPPED | OUTPATIENT
Start: 2022-06-15 | End: 2022-09-18

## 2022-06-15 RX ORDER — NAPROXEN SODIUM 220 MG/1
81 TABLET, FILM COATED ORAL DAILY
Qty: 90 TABLET | Refills: 1 | Status: SHIPPED | OUTPATIENT
Start: 2022-06-15 | End: 2022-09-12 | Stop reason: SDUPTHER

## 2022-06-15 RX ORDER — GABAPENTIN 300 MG/1
600 CAPSULE ORAL 3 TIMES DAILY
Qty: 270 CAPSULE | Refills: 1 | Status: SHIPPED | OUTPATIENT
Start: 2022-06-15 | End: 2022-12-05 | Stop reason: SDUPTHER

## 2022-06-15 RX ORDER — TRIAMCINOLONE ACETONIDE 40 MG/ML
40 INJECTION, SUSPENSION INTRA-ARTICULAR; INTRAMUSCULAR
Status: DISCONTINUED | OUTPATIENT
Start: 2022-06-15 | End: 2022-06-15 | Stop reason: HOSPADM

## 2022-06-15 RX ORDER — OMEPRAZOLE 20 MG/1
20 CAPSULE, DELAYED RELEASE ORAL DAILY
Qty: 90 CAPSULE | Refills: 1 | Status: SHIPPED | OUTPATIENT
Start: 2022-06-15 | End: 2022-12-05 | Stop reason: SDUPTHER

## 2022-06-15 RX ORDER — LEVOTHYROXINE SODIUM 200 UG/1
200 TABLET ORAL
Qty: 90 TABLET | Refills: 1 | Status: SHIPPED | OUTPATIENT
Start: 2022-06-15 | End: 2022-12-05 | Stop reason: SDUPTHER

## 2022-06-15 RX ORDER — ATORVASTATIN CALCIUM 80 MG/1
80 TABLET, FILM COATED ORAL DAILY
Qty: 90 TABLET | Refills: 1 | Status: SHIPPED | OUTPATIENT
Start: 2022-06-15 | End: 2022-09-12 | Stop reason: SDUPTHER

## 2022-06-15 RX ORDER — GLIMEPIRIDE 4 MG/1
TABLET ORAL
Qty: 180 TABLET | Refills: 0 | Status: SHIPPED | OUTPATIENT
Start: 2022-06-15 | End: 2022-09-12 | Stop reason: SDUPTHER

## 2022-06-15 RX ORDER — AMLODIPINE BESYLATE 10 MG/1
10 TABLET ORAL DAILY
Qty: 90 TABLET | Refills: 1 | Status: SHIPPED | OUTPATIENT
Start: 2022-06-15 | End: 2022-09-12 | Stop reason: SDUPTHER

## 2022-06-15 RX ORDER — FLASH GLUCOSE SENSOR
KIT MISCELLANEOUS
Qty: 2 KIT | Refills: 2 | Status: SHIPPED | OUTPATIENT
Start: 2022-06-15 | End: 2022-12-05

## 2022-06-15 RX ORDER — EMPAGLIFLOZIN 25 MG/1
25 TABLET, FILM COATED ORAL DAILY
Qty: 90 TABLET | Refills: 0 | Status: SHIPPED | OUTPATIENT
Start: 2022-06-15 | End: 2022-09-12 | Stop reason: SDUPTHER

## 2022-06-15 RX ORDER — LIDOCAINE HYDROCHLORIDE 10 MG/ML
1 INJECTION INFILTRATION; PERINEURAL
Status: DISCONTINUED | OUTPATIENT
Start: 2022-06-15 | End: 2022-06-15 | Stop reason: HOSPADM

## 2022-06-15 RX ORDER — VIT C/E/ZN/COPPR/LUTEIN/ZEAXAN 250MG-90MG
1000 CAPSULE ORAL DAILY
Qty: 12 CAPSULE | Refills: 1 | Status: SHIPPED | OUTPATIENT
Start: 2022-06-15 | End: 2022-09-12 | Stop reason: SDUPTHER

## 2022-06-15 RX ADMIN — LIDOCAINE HYDROCHLORIDE 1 ML: 10 INJECTION INFILTRATION; PERINEURAL at 06:06

## 2022-06-15 RX ADMIN — TRIAMCINOLONE ACETONIDE 40 MG: 40 INJECTION, SUSPENSION INTRA-ARTICULAR; INTRAMUSCULAR at 06:06

## 2022-06-15 NOTE — PROGRESS NOTES
ANDREA Sommers   [facility]  [address]  [place] [address]     PATIENT NAME: James Mcclellan  : 1965  DATE: 6/15/22  MRN: [id number]      Billing Provider: ANDREA Sommers  Level of Service: [place] OFFICE/OUTPT VISIT, EST, LEVL IV, 30-39 MIN  Patient PCP Information     Provider PCP Type    ANDREA Sommers General          Reason for Visit / Chief Complaint: Medication Refill and heel spur left foot  (Heel spur states he would like a shot in it /)       Update PCP  Update Chief Complaint         History of Present Illness / Problem Focused Workflow     James Mcclellan presents to the clinic for follow up on DM, HTN, hypothyroidism, GERD  Has spur left heel, has had xray in the past and injection over a year ago.      Review of Systems     Review of Systems   Constitutional: Negative.  Negative for activity change, appetite change and unexpected weight change.   Eyes: Negative for visual disturbance.   Respiratory: Negative for cough, chest tightness and shortness of breath.    Cardiovascular: Negative for chest pain, palpitations and leg swelling.   Gastrointestinal: Negative for abdominal pain, blood in stool, change in bowel habit, nausea, vomiting and change in bowel habit.   Endocrine: Negative for cold intolerance, heat intolerance, polydipsia, polyphagia and polyuria.   Genitourinary: Negative for difficulty urinating, frequency and urgency.   Musculoskeletal: Positive for arthralgias (chronic).        Pain left heel, hx of heel spur   Allergic/Immunologic: Negative for frequent infections.   Neurological: Positive for numbness (feet). Negative for dizziness, weakness and headaches.   Psychiatric/Behavioral: Negative for sleep disturbance.        Medical / Social / Family History     Past Medical History:   Diagnosis Date    Bell's palsy 10/15/2018    Left Side of Face    Degenerative cervical disc     GERD (gastroesophageal reflux disease)     History  of COVID-19 01/19/2021    Hypertension     Hypothyroidism     Neck pain     Type 2 diabetes mellitus        Past Surgical History:   Procedure Laterality Date    ANTERIOR CRUCIATE LIGAMENT REPAIR      CHOLECYSTECTOMY      SINUS SURGERY         Social History    reports that he has never smoked. He has never used smokeless tobacco. He reports that he does not drink alcohol and does not use drugs.    Family History  's family history includes Diabetes in his father and sister; Heart disease in his father; Hypertension in his brother, father, mother, and sister.    Medications and Allergies     Medications  Outpatient Medications Marked as Taking for the 6/15/22 encounter (Office Visit) with ANDREA Vilchis   Medication Sig Dispense Refill    lancets 33 gauge Misc Use as directed to check blood glucose      [DISCONTINUED] aspirin 81 MG Chew Take 81 mg by mouth once daily.      [DISCONTINUED] atorvastatin (LIPITOR) 80 MG tablet Take 80 mg by mouth once daily.      [DISCONTINUED] cholecalciferol, vitamin D3, (VITAMIN D3) 25 mcg (1,000 unit) capsule Take 1,000 Units by mouth once daily.      [DISCONTINUED] gabapentin (NEURONTIN) 300 MG capsule Take 600 mg by mouth 3 (three) times daily.      [DISCONTINUED] glimepiride (AMARYL) 4 MG tablet TAKE 2 TABLETS DAILY WITH BREAKFAST 180 tablet 0    [DISCONTINUED] insulin (LANTUS SOLOSTAR U-100 INSULIN) glargine 100 units/mL (3mL) SubQ pen Inject 45 Units into the skin every evening.       [DISCONTINUED] levothyroxine (SYNTHROID) 200 MCG tablet Take 200 mcg by mouth before breakfast.      [DISCONTINUED] lisinopriL (PRINIVIL,ZESTRIL) 40 MG tablet Take 40 mg by mouth once daily.      [DISCONTINUED] omega-3 acid ethyl esters (LOVAZA) 1 gram capsule Take 2 g by mouth 2 (two) times daily.      [DISCONTINUED] omeprazole (PRILOSEC) 20 MG capsule Take 20 mg by mouth once daily.         Allergies  Review of patient's allergies indicates:  No Known  "Allergies    Physical Examination     Vitals:    06/15/22 1107   BP: 120/62   BP Location: Left arm   Patient Position: Sitting   BP Method: Medium (Manual)   Pulse: 68   Resp: 20   Temp: 97.8 °F (36.6 °C)   TempSrc: Oral   SpO2: 98%   Weight: 99.2 kg (218 lb 12.8 oz)   Height: 5' 9" (1.753 m)      Physical Exam  Constitutional:       General: He is not in acute distress.     Appearance: Normal appearance.   HENT:      Nose: No congestion.   Eyes:      General: Gaze aligned appropriately. No scleral icterus.     Extraocular Movements: Extraocular movements intact.      Conjunctiva/sclera:      Right eye: Right conjunctiva is not injected.      Left eye: Left conjunctiva is not injected.   Neck:      Thyroid: No thyromegaly.      Vascular: No carotid bruit.   Cardiovascular:      Rate and Rhythm: Normal rate and regular rhythm.      Pulses:           Carotid pulses are 3+ on the right side and 3+ on the left side.       Dorsalis pedis pulses are 2+ on the right side and 2+ on the left side.        Posterior tibial pulses are 2+ on the right side and 2+ on the left side.      Heart sounds: Normal heart sounds. No murmur heard.  Pulmonary:      Effort: Pulmonary effort is normal. No accessory muscle usage or respiratory distress.      Breath sounds: Normal breath sounds. No wheezing, rhonchi or rales.   Abdominal:      General: Bowel sounds are normal.      Palpations: Abdomen is soft.      Tenderness: There is no abdominal tenderness.   Musculoskeletal:         General: Normal range of motion.      Cervical back: Neck supple.      Right lower leg: No edema.      Left lower leg: No edema.      Left foot: Normal capillary refill. Tenderness present. No swelling, deformity or laceration.        Feet:       Comments: Pain left heel with direct pressure   Feet:      Right foot:      Skin integrity: No skin breakdown or callus.      Left foot:      Skin integrity: No skin breakdown or callus.   Skin:     General: Skin is " warm and dry.      Capillary Refill: Capillary refill takes 2 to 3 seconds.      Coloration: Skin is not jaundiced or pale.   Neurological:      Mental Status: He is alert and oriented to person, place, and time.      Sensory: Sensory deficit present.      Motor: No weakness.      Gait: Gait is intact.   Psychiatric:         Speech: Speech normal.         Behavior: Behavior normal.          Assessment and Plan (including Health Maintenance)      Problem List  Smart Sets  Document Outside HM   :          Health Maintenance Due   Topic Date Due    Hemoglobin A1c  12/20/2021       Problem List Items Addressed This Visit        Orthopedic    Calcaneal spur      Other Visit Diagnoses     Essential hypertension, benign    -  Primary    Will check CBC and CMP and continue current medication and diet.    Relevant Medications    lisinopriL (PRINIVIL,ZESTRIL) 40 MG tablet    amLODIPine (NORVASC) 10 MG tablet    Other Relevant Orders    CBC Auto Differential    Comprehensive Metabolic Panel    Type 2 diabetes mellitus with diabetic polyneuropathy, with long-term current use of insulin        Hgb A1c ordered. Pt to start Jardiance back along with other medication and stricter diabetic diet. Hgb A1c goal < 7.0    Relevant Medications    insulin (LANTUS SOLOSTAR U-100 INSULIN) glargine 100 units/mL (3mL) SubQ pen    glimepiride (AMARYL) 4 MG tablet    gabapentin (NEURONTIN) 300 MG capsule    flash glucose sensor (FREESTYLE KORI 14 DAY SENSOR) Kit    Other Relevant Orders    Hemoglobin A1C    Hyperlipidemia associated with type 2 diabetes mellitus        Fasting lipids ordered; continue current medication and LFLC diet. LDL goal < 70    Relevant Medications    insulin (LANTUS SOLOSTAR U-100 INSULIN) glargine 100 units/mL (3mL) SubQ pen    glimepiride (AMARYL) 4 MG tablet    atorvastatin (LIPITOR) 80 MG tablet    omega-3 acid ethyl esters (LOVAZA) 1 gram capsule    Other Relevant Orders    Lipid Panel    Hypothyroidism,  unspecified type        TSH ordered; continue current medication. take medication on empty stomach    Relevant Medications    levothyroxine (SYNTHROID) 200 MCG tablet    Other Relevant Orders    TSH    Screening for colon cancer        Cologuard ordered    Relevant Orders    Cologuard Screening (Multitarget Stool DNA)    Gastroesophageal reflux disease, unspecified whether esophagitis present        Continue current medication     Relevant Medications    omeprazole (PRILOSEC) 20 MG capsule    Vitamin D deficiency        Relevant Medications    cholecalciferol, vitamin D3, (VITAMIN D3) 25 mcg (1,000 unit) capsule        Essential hypertension, benign  Comments:  Will check CBC and CMP and continue current medication and diet.  Orders:  -     CBC Auto Differential; Future; Expected date: 06/15/2022  -     Comprehensive Metabolic Panel; Future; Expected date: 06/15/2022  -     lisinopriL (PRINIVIL,ZESTRIL) 40 MG tablet; Take 1 tablet (40 mg total) by mouth once daily.  Dispense: 90 tablet; Refill: 1  -     amLODIPine (NORVASC) 10 MG tablet; Take 1 tablet (10 mg total) by mouth once daily.  Dispense: 90 tablet; Refill: 1    Type 2 diabetes mellitus with diabetic polyneuropathy, with long-term current use of insulin  Comments:  Hgb A1c ordered. Pt to start Jardiance back along with other medication and stricter diabetic diet. Hgb A1c goal < 7.0  Orders:  -     Hemoglobin A1C; Future; Expected date: 06/15/2022  -     insulin (LANTUS SOLOSTAR U-100 INSULIN) glargine 100 units/mL (3mL) SubQ pen; Inject 45 Units into the skin every evening.  Dispense: 3 each; Refill: 2  -     glimepiride (AMARYL) 4 MG tablet; TAKE 2 TABLETS DAILY WITH BREAKFAST  Dispense: 180 tablet; Refill: 0  -     gabapentin (NEURONTIN) 300 MG capsule; Take 2 capsules (600 mg total) by mouth 3 (three) times daily.  Dispense: 270 capsule; Refill: 1  -     flash glucose sensor (FREESTYLE KORI 14 DAY SENSOR) Kit; Use as directed. Can download alexandro on smart  phone to get readings from sensor.  Dispense: 2 kit; Refill: 2    Hyperlipidemia associated with type 2 diabetes mellitus  Comments:  Fasting lipids ordered; continue current medication and LFLC diet. LDL goal < 70  Orders:  -     Lipid Panel; Future; Expected date: 06/15/2022  -     atorvastatin (LIPITOR) 80 MG tablet; Take 1 tablet (80 mg total) by mouth once daily.  Dispense: 90 tablet; Refill: 1  -     omega-3 acid ethyl esters (LOVAZA) 1 gram capsule; Take 2 capsules (2 g total) by mouth 2 (two) times daily.  Dispense: 360 capsule; Refill: 0    Hypothyroidism, unspecified type  Comments:  TSH ordered; continue current medication. take medication on empty stomach  Orders:  -     TSH; Future; Expected date: 06/15/2022  -     levothyroxine (SYNTHROID) 200 MCG tablet; Take 1 tablet (200 mcg total) by mouth before breakfast.  Dispense: 90 tablet; Refill: 1    Screening for colon cancer  Comments:  Cologuard ordered  Orders:  -     Cologuard Screening (Multitarget Stool DNA); Future; Expected date: 06/15/2022    Gastroesophageal reflux disease, unspecified whether esophagitis present  Comments:  Continue current medication   Orders:  -     omeprazole (PRILOSEC) 20 MG capsule; Take 1 capsule (20 mg total) by mouth once daily.  Dispense: 90 capsule; Refill: 1    Vitamin D deficiency  -     cholecalciferol, vitamin D3, (VITAMIN D3) 25 mcg (1,000 unit) capsule; Take 1 capsule (1,000 Units total) by mouth once daily.  Dispense: 12 capsule; Refill: 1    Calcaneal spur of left foot  Comments:  Left heel injected with kenalog and lidocaine. See procedure noted    Other orders  -     aspirin 81 MG Chew; Take 1 tablet (81 mg total) by mouth once daily.  Dispense: 90 tablet; Refill: 1  -     Cancel: Trigger Point Injection  -     Cancel: Intermediate Joint Aspiration/Injection  -     Trigger Point Injection  -     empagliflozin (JARDIANCE) 25 mg tablet; Take 1 tablet (25 mg total) by mouth once daily.  Dispense: 90 tablet;  Refill: 0       Health Maintenance Topics with due status: Not Due       Topic Last Completion Date    Diabetes Urine Screening 09/20/2021    Foot Exam 09/20/2021    Lipid Panel 09/20/2021    Low Dose Statin 06/15/2022     Trigger point injection, tendon sheath left heel  Trigger Point Injection  Performed by: ANDREA Vilchis  Authorized by: ANDREA Vilchis       Consent Done?:  Yes (Verbal)    Pre-Procedure:   Indications:  Pain relief  Site marked: the procedure site was marked (left medial heel)     Timeout: prior to procedure the correct patient, procedure, and site was verified    Prep: patient was prepped and draped in usual sterile fashion       Local anesthesia used?: No    Medications: 1 mL LIDOcaine HCL 10 mg/ml (1%) 10 mg/mL (1 %); 40 mg triamcinolone acetonide 40 mg/mL       Future Appointments   Date Time Provider Department Center   9/12/2022  9:30 AM ANDREA Vilchis [facility] STEPHANIE Larsen [facility]        Follow up in about 3 months (around 9/15/2022).     Signature:  ANDREA Sommers    Date of encounter: 6/15/22

## 2022-06-16 ENCOUNTER — TELEPHONE (OUTPATIENT)
Dept: FAMILY MEDICINE | Facility: CLINIC | Age: 57
End: 2022-06-16
Payer: OTHER GOVERNMENT

## 2022-06-16 NOTE — TELEPHONE ENCOUNTER
Several of the patient's medications are not covered through the [facility]. Lovaza and Freestyle glucometer. [facility] covers Fish Oil 1000mg/50DHA/EPA. Freestyle is restricted to endocrinologist.

## 2022-06-21 DIAGNOSIS — Z79.4 TYPE 2 DIABETES MELLITUS WITH DIABETIC POLYNEUROPATHY, WITH LONG-TERM CURRENT USE OF INSULIN: ICD-10-CM

## 2022-06-21 DIAGNOSIS — E11.42 TYPE 2 DIABETES MELLITUS WITH DIABETIC POLYNEUROPATHY, WITH LONG-TERM CURRENT USE OF INSULIN: ICD-10-CM

## 2022-06-26 DIAGNOSIS — E11.42 TYPE 2 DIABETES MELLITUS WITH DIABETIC POLYNEUROPATHY, WITH LONG-TERM CURRENT USE OF INSULIN: Primary | ICD-10-CM

## 2022-06-26 DIAGNOSIS — Z79.4 TYPE 2 DIABETES MELLITUS WITH DIABETIC POLYNEUROPATHY, WITH LONG-TERM CURRENT USE OF INSULIN: Primary | ICD-10-CM

## 2022-06-27 ENCOUNTER — OFFICE VISIT (OUTPATIENT)
Dept: FAMILY MEDICINE | Facility: CLINIC | Age: 57
End: 2022-06-27
Payer: OTHER GOVERNMENT

## 2022-06-27 VITALS
BODY MASS INDEX: 31.87 KG/M2 | OXYGEN SATURATION: 96 % | TEMPERATURE: 98 F | HEART RATE: 63 BPM | WEIGHT: 215.19 LBS | DIASTOLIC BLOOD PRESSURE: 70 MMHG | HEIGHT: 69 IN | SYSTOLIC BLOOD PRESSURE: 106 MMHG | RESPIRATION RATE: 18 BRPM

## 2022-06-27 DIAGNOSIS — E11.42 TYPE 2 DIABETES MELLITUS WITH DIABETIC POLYNEUROPATHY, WITH LONG-TERM CURRENT USE OF INSULIN: Primary | ICD-10-CM

## 2022-06-27 DIAGNOSIS — E78.5 HYPERLIPIDEMIA ASSOCIATED WITH TYPE 2 DIABETES MELLITUS: ICD-10-CM

## 2022-06-27 DIAGNOSIS — M72.2 PLANTAR FASCIITIS OF LEFT FOOT: ICD-10-CM

## 2022-06-27 DIAGNOSIS — E11.69 HYPERLIPIDEMIA ASSOCIATED WITH TYPE 2 DIABETES MELLITUS: ICD-10-CM

## 2022-06-27 DIAGNOSIS — Z79.4 TYPE 2 DIABETES MELLITUS WITH DIABETIC POLYNEUROPATHY, WITH LONG-TERM CURRENT USE OF INSULIN: Primary | ICD-10-CM

## 2022-06-27 PROCEDURE — 99213 PR OFFICE/OUTPT VISIT, EST, LEVL III, 20-29 MIN: ICD-10-PCS | Mod: ,,, | Performed by: NURSE PRACTITIONER

## 2022-06-27 PROCEDURE — 99213 OFFICE O/P EST LOW 20 MIN: CPT | Mod: ,,, | Performed by: NURSE PRACTITIONER

## 2022-06-27 RX ORDER — OMEGA-3-ACID ETHYL ESTERS 1 G/1
2 CAPSULE, LIQUID FILLED ORAL 2 TIMES DAILY
Qty: 360 CAPSULE | Refills: 1 | Status: SHIPPED | OUTPATIENT
Start: 2022-06-27 | End: 2022-12-05 | Stop reason: SDUPTHER

## 2022-06-27 RX ORDER — MAGNESIUM OXIDE 420 MG/1
1 TABLET ORAL DAILY
COMMUNITY
End: 2022-06-27 | Stop reason: SDUPTHER

## 2022-06-27 RX ORDER — MAGNESIUM OXIDE 420 MG/1
1 TABLET ORAL DAILY
Qty: 90 EACH | Refills: 1 | Status: SHIPPED | OUTPATIENT
Start: 2022-06-27 | End: 2022-09-12 | Stop reason: SDUPTHER

## 2022-09-12 ENCOUNTER — OFFICE VISIT (OUTPATIENT)
Dept: FAMILY MEDICINE | Facility: CLINIC | Age: 57
End: 2022-09-12
Payer: OTHER GOVERNMENT

## 2022-09-12 VITALS
SYSTOLIC BLOOD PRESSURE: 130 MMHG | HEART RATE: 76 BPM | OXYGEN SATURATION: 99 % | BODY MASS INDEX: 32.71 KG/M2 | RESPIRATION RATE: 18 BRPM | HEIGHT: 69 IN | DIASTOLIC BLOOD PRESSURE: 82 MMHG | WEIGHT: 220.81 LBS

## 2022-09-12 DIAGNOSIS — E78.5 HYPERLIPIDEMIA ASSOCIATED WITH TYPE 2 DIABETES MELLITUS: ICD-10-CM

## 2022-09-12 DIAGNOSIS — E03.9 HYPOTHYROIDISM, UNSPECIFIED TYPE: ICD-10-CM

## 2022-09-12 DIAGNOSIS — I10 ESSENTIAL HYPERTENSION, BENIGN: Primary | ICD-10-CM

## 2022-09-12 DIAGNOSIS — E11.42 TYPE 2 DIABETES MELLITUS WITH DIABETIC POLYNEUROPATHY, WITH LONG-TERM CURRENT USE OF INSULIN: ICD-10-CM

## 2022-09-12 DIAGNOSIS — E55.9 VITAMIN D DEFICIENCY: ICD-10-CM

## 2022-09-12 DIAGNOSIS — Z79.4 TYPE 2 DIABETES MELLITUS WITH DIABETIC POLYNEUROPATHY, WITH LONG-TERM CURRENT USE OF INSULIN: ICD-10-CM

## 2022-09-12 DIAGNOSIS — E11.69 HYPERLIPIDEMIA ASSOCIATED WITH TYPE 2 DIABETES MELLITUS: ICD-10-CM

## 2022-09-12 LAB
EST. AVERAGE GLUCOSE BLD GHB EST-MCNC: 227 MG/DL
HBA1C MFR BLD HPLC: 9.4 % (ref 4.5–6.6)
TSH SERPL DL<=0.005 MIU/L-ACNC: 23.6 UIU/ML (ref 0.36–3.74)

## 2022-09-12 PROCEDURE — 99213 OFFICE O/P EST LOW 20 MIN: CPT | Mod: ,,, | Performed by: NURSE PRACTITIONER

## 2022-09-12 PROCEDURE — 84443 ASSAY THYROID STIM HORMONE: CPT | Mod: ,,, | Performed by: CLINICAL MEDICAL LABORATORY

## 2022-09-12 PROCEDURE — 83036 HEMOGLOBIN A1C: ICD-10-PCS | Mod: ,,, | Performed by: CLINICAL MEDICAL LABORATORY

## 2022-09-12 PROCEDURE — 82570 MICROALBUMIN / CREATININE RATIO URINE: ICD-10-PCS | Mod: ,,, | Performed by: CLINICAL MEDICAL LABORATORY

## 2022-09-12 PROCEDURE — 82043 MICROALBUMIN / CREATININE RATIO URINE: ICD-10-PCS | Mod: ,,, | Performed by: CLINICAL MEDICAL LABORATORY

## 2022-09-12 PROCEDURE — 82043 UR ALBUMIN QUANTITATIVE: CPT | Mod: ,,, | Performed by: CLINICAL MEDICAL LABORATORY

## 2022-09-12 PROCEDURE — 83036 HEMOGLOBIN GLYCOSYLATED A1C: CPT | Mod: ,,, | Performed by: CLINICAL MEDICAL LABORATORY

## 2022-09-12 PROCEDURE — 82570 ASSAY OF URINE CREATININE: CPT | Mod: ,,, | Performed by: CLINICAL MEDICAL LABORATORY

## 2022-09-12 PROCEDURE — 84443 TSH: ICD-10-PCS | Mod: ,,, | Performed by: CLINICAL MEDICAL LABORATORY

## 2022-09-12 PROCEDURE — 99213 PR OFFICE/OUTPT VISIT, EST, LEVL III, 20-29 MIN: ICD-10-PCS | Mod: ,,, | Performed by: NURSE PRACTITIONER

## 2022-09-12 RX ORDER — MAGNESIUM OXIDE 420 MG/1
1 TABLET ORAL DAILY
Qty: 90 EACH | Refills: 1 | Status: SHIPPED | OUTPATIENT
Start: 2022-09-12 | End: 2022-12-05 | Stop reason: SDUPTHER

## 2022-09-12 RX ORDER — GLIMEPIRIDE 4 MG/1
TABLET ORAL
Qty: 180 TABLET | Refills: 0 | Status: SHIPPED | OUTPATIENT
Start: 2022-09-12 | End: 2022-12-05 | Stop reason: SDUPTHER

## 2022-09-12 RX ORDER — LANCETS 33 GAUGE
EACH MISCELLANEOUS
Qty: 100 EACH | Refills: 11 | Status: SHIPPED | OUTPATIENT
Start: 2022-09-12 | End: 2024-01-30 | Stop reason: SDUPTHER

## 2022-09-12 RX ORDER — VIT C/E/ZN/COPPR/LUTEIN/ZEAXAN 250MG-90MG
1000 CAPSULE ORAL DAILY
Qty: 12 CAPSULE | Refills: 1 | Status: SHIPPED | OUTPATIENT
Start: 2022-09-12 | End: 2022-09-30 | Stop reason: SDUPTHER

## 2022-09-12 RX ORDER — LISINOPRIL 40 MG/1
40 TABLET ORAL DAILY
Qty: 90 TABLET | Refills: 1 | Status: SHIPPED | OUTPATIENT
Start: 2022-09-12 | End: 2022-12-05 | Stop reason: SDUPTHER

## 2022-09-12 RX ORDER — AMLODIPINE BESYLATE 10 MG/1
10 TABLET ORAL DAILY
Qty: 90 TABLET | Refills: 1 | Status: SHIPPED | OUTPATIENT
Start: 2022-09-12 | End: 2022-12-05 | Stop reason: SDUPTHER

## 2022-09-12 RX ORDER — NAPROXEN SODIUM 220 MG/1
81 TABLET, FILM COATED ORAL DAILY
Qty: 90 TABLET | Refills: 1 | Status: SHIPPED | OUTPATIENT
Start: 2022-09-12 | End: 2022-12-05 | Stop reason: SDUPTHER

## 2022-09-12 RX ORDER — ATORVASTATIN CALCIUM 80 MG/1
80 TABLET, FILM COATED ORAL DAILY
Qty: 90 TABLET | Refills: 1 | Status: SHIPPED | OUTPATIENT
Start: 2022-09-12 | End: 2022-12-05 | Stop reason: SDUPTHER

## 2022-09-12 RX ORDER — EMPAGLIFLOZIN 25 MG/1
25 TABLET, FILM COATED ORAL DAILY
Qty: 90 TABLET | Refills: 0 | Status: SHIPPED | OUTPATIENT
Start: 2022-09-12 | End: 2022-12-05 | Stop reason: SDUPTHER

## 2022-09-12 NOTE — PROGRESS NOTES
ANDREA Sommers   [facility]  [address]  [place] [address]     PATIENT NAME: James Mcclellan  : 1965  DATE: 22  MRN: [id number]      Billing Provider: ANDREA Sommers  Level of Service: MD OFFICE/OUTPT VISIT, EST, LEVL III, 20-29 MIN  Patient PCP Information       Provider PCP Type    ANDREA Sommers General            Reason for Visit / Chief Complaint: Follow-up, Hypertension (States that he has had periods of elevated blood pressure readings at home), and Diabetes (10/12/2022 appointment with Endocrinology)       Update PCP  Update Chief Complaint         History of Present Illness / Problem Focused Workflow     James Mcclellan presents to the clinic for follow up on HTN and DM; has been checking BP at home and has only had a couple of high readings. Blood sugar was 183 yesterday.   Hx of HTN, DM, hypothyroidism  Last visit pt was not taking thyroid medication as directed and taking with other medications.       Review of Systems     Review of Systems   Constitutional: Negative.  Negative for activity change, appetite change, fatigue and unexpected weight change.   HENT:  Negative for trouble swallowing.    Eyes:  Negative for visual disturbance.   Respiratory:  Negative for cough, chest tightness and shortness of breath.    Cardiovascular:  Negative for chest pain, palpitations, leg swelling and claudication.   Gastrointestinal:  Negative for abdominal pain, blood in stool, change in bowel habit, nausea, vomiting and change in bowel habit.   Endocrine: Negative for cold intolerance, heat intolerance, polydipsia, polyphagia and polyuria.   Genitourinary:  Negative for difficulty urinating and frequency.   Musculoskeletal:  Positive for neck pain (chronic).   Integumentary:  Negative for wound.   Neurological:  Negative for dizziness, weakness and headaches.      Medical / Social / Family History     Past Medical History:   Diagnosis Date    Bell's palsy 10/15/2018     Left Side of Face    Degenerative cervical disc     GERD (gastroesophageal reflux disease)     History of COVID-19 01/19/2021    Hypertension     Hypothyroidism     Neck pain     Type 2 diabetes mellitus        Past Surgical History:   Procedure Laterality Date    ANTERIOR CRUCIATE LIGAMENT REPAIR      CHOLECYSTECTOMY      SINUS SURGERY         Social History    reports that he has never smoked. He has never used smokeless tobacco. He reports that he does not drink alcohol and does not use drugs.    Family History  's family history includes Diabetes in his father and sister; Heart disease in his father; Hypertension in his brother, father, mother, and sister.    Medications and Allergies     Medications  Outpatient Medications Marked as Taking for the 9/12/22 encounter (Office Visit) with ANDREA Vilchis   Medication Sig Dispense Refill    gabapentin (NEURONTIN) 300 MG capsule Take 2 capsules (600 mg total) by mouth 3 (three) times daily. 270 capsule 1    insulin (LANTUS SOLOSTAR U-100 INSULIN) glargine 100 units/mL (3mL) SubQ pen Inject 45 Units into the skin every evening. 3 each 2    levothyroxine (SYNTHROID) 200 MCG tablet Take 1 tablet (200 mcg total) by mouth before breakfast. 90 tablet 1    omega-3 acid ethyl esters (LOVAZA) 1 gram capsule Take 2 capsules (2 g total) by mouth 2 (two) times daily. 360 capsule 1    omeprazole (PRILOSEC) 20 MG capsule Take 1 capsule (20 mg total) by mouth once daily. 90 capsule 1    [DISCONTINUED] amLODIPine (NORVASC) 10 MG tablet Take 1 tablet (10 mg total) by mouth once daily. 90 tablet 1    [DISCONTINUED] aspirin 81 MG Chew Take 1 tablet (81 mg total) by mouth once daily. 90 tablet 1    [DISCONTINUED] atorvastatin (LIPITOR) 80 MG tablet Take 1 tablet (80 mg total) by mouth once daily. 90 tablet 1    [DISCONTINUED] blood sugar diagnostic Strp 100 strips by Misc.(Non-Drug; Combo Route) route. Check blood sugar daily E11.9      [DISCONTINUED] cholecalciferol,  "vitamin D3, (VITAMIN D3) 25 mcg (1,000 unit) capsule Take 1 capsule (1,000 Units total) by mouth once daily. 12 capsule 1    [DISCONTINUED] empagliflozin (JARDIANCE) 25 mg tablet Take 1 tablet (25 mg total) by mouth once daily. 90 tablet 0    [DISCONTINUED] glimepiride (AMARYL) 4 MG tablet TAKE 2 TABLETS DAILY WITH BREAKFAST 180 tablet 0    [DISCONTINUED] lancets 33 gauge Misc Use as directed to check blood glucose      [DISCONTINUED] lisinopriL (PRINIVIL,ZESTRIL) 40 MG tablet Take 1 tablet (40 mg total) by mouth once daily. 90 tablet 1    [DISCONTINUED] magnesium oxide 420 mg Tab Take 1 tablet by mouth once daily. 90 each 1       Allergies  Review of patient's allergies indicates:  No Known Allergies    Physical Examination     Vitals:    09/12/22 0933   BP: 130/82   Pulse: 76   Resp: 18   SpO2: 99%   Weight: 100.2 kg (220 lb 12.8 oz)   Height: 5' 9" (1.753 m)      Physical Exam  Constitutional:       General: He is not in acute distress.     Appearance: Normal appearance.   HENT:      Nose: No congestion.   Eyes:      General: No scleral icterus.     Conjunctiva/sclera: Conjunctivae normal.   Neck:      Thyroid: No thyromegaly.      Vascular: No carotid bruit.   Cardiovascular:      Rate and Rhythm: Normal rate and regular rhythm.      Pulses: Normal pulses.           Dorsalis pedis pulses are 2+ on the right side and 2+ on the left side.      Heart sounds: Normal heart sounds. No murmur heard.  Pulmonary:      Effort: Pulmonary effort is normal. No accessory muscle usage or respiratory distress.      Breath sounds: Normal breath sounds. No wheezing, rhonchi or rales.   Abdominal:      General: Bowel sounds are normal. There is no distension.      Palpations: Abdomen is soft.      Tenderness: There is no abdominal tenderness.   Musculoskeletal:         General: Normal range of motion.      Cervical back: Neck supple.      Right foot: No Charcot foot.      Left foot: No Charcot foot.   Feet:      Right foot:      " Protective Sensation: 10 sites tested.  8 sites sensed.      Skin integrity: Skin integrity normal.      Left foot:      Protective Sensation: 10 sites tested.  8 sites sensed.      Skin integrity: Skin integrity normal.   Skin:     General: Skin is warm and dry.      Capillary Refill: Capillary refill takes 2 to 3 seconds.      Coloration: Skin is not jaundiced or pale.   Neurological:      Mental Status: He is alert and oriented to person, place, and time.      Gait: Gait is intact.   Psychiatric:         Mood and Affect: Mood normal.         Behavior: Behavior normal.        Assessment and Plan (including Health Maintenance)      Problem List  Smart Sets  Document Outside HM   :  Lab Results   Component Value Date    CHOL 112 06/15/2022    CHOL 91 09/20/2021    CHOL 137 06/21/2021     Lab Results   Component Value Date    HDL 35 (L) 06/15/2022    HDL 36 (L) 09/20/2021    HDL 37 (L) 06/21/2021     Lab Results   Component Value Date    LDLCALC 57 06/15/2022    LDLCALC 40 09/20/2021    LDLCALC 62 06/21/2021     Lab Results   Component Value Date    TRIG 98 06/15/2022    TRIG 76 09/20/2021    TRIG 191 (H) 06/21/2021     Lab Results   Component Value Date    CHOLHDL 3.2 06/15/2022    CHOLHDL 2.5 09/20/2021    CHOLHDL 3.7 06/21/2021     Lab Results   Component Value Date    HGBA1C 9.4 (H) 09/12/2022     CMP  Sodium   Date Value Ref Range Status   06/15/2022 137 136 - 145 mmol/L Final     Potassium   Date Value Ref Range Status   06/15/2022 4.0 3.5 - 5.1 mmol/L Final     Chloride   Date Value Ref Range Status   06/15/2022 104 98 - 107 mmol/L Final     CO2   Date Value Ref Range Status   06/15/2022 27 21 - 32 mmol/L Final     Glucose   Date Value Ref Range Status   06/15/2022 261 (H) 74 - 106 mg/dL Final     BUN   Date Value Ref Range Status   06/15/2022 9 7 - 18 mg/dL Final     Creatinine   Date Value Ref Range Status   06/15/2022 0.68 (L) 0.70 - 1.30 mg/dL Final     Calcium   Date Value Ref Range Status   06/15/2022  8.1 (L) 8.5 - 10.1 mg/dL Final     Total Protein   Date Value Ref Range Status   06/15/2022 7.0 6.4 - 8.2 g/dL Final     Albumin   Date Value Ref Range Status   06/15/2022 3.4 (L) 3.5 - 5.0 g/dL Final     Bilirubin, Total   Date Value Ref Range Status   06/15/2022 2.3 (H) 0.0 - 1.2 mg/dL Final     Alk Phos   Date Value Ref Range Status   06/15/2022 81 45 - 115 U/L Final     AST   Date Value Ref Range Status   06/15/2022 27 15 - 37 U/L Final     ALT   Date Value Ref Range Status   06/15/2022 43 16 - 61 U/L Final     Anion Gap   Date Value Ref Range Status   06/15/2022 10 7 - 16 mmol/L Final     eGFR   Date Value Ref Range Status   06/15/2022 129 >=60 mL/min/1.73m² Final     Lab Results   Component Value Date    TSH 23.600 (H) 09/12/2022             Health Maintenance Due   Topic Date Due    Pneumococcal Vaccines (Age 0-64) (2 - PCV) 11/03/2015           Type 2 diabetes mellitus with diabetic polyneuropathy, with long-term current use of insulin  Comments:  Hgb A1c, and microalbumin ordered. Continue current medication and diabetic diet. Hgb A1c goal < 7.0. Refer to Endocrinology  Orders:  -     Hemoglobin A1C; Future; Expected date: 09/12/2022  -     Microalbumin/Creatinine Ratio, Urine  -     empagliflozin (JARDIANCE) 25 mg tablet; Take 1 tablet (25 mg total) by mouth once daily.  Dispense: 90 tablet; Refill: 0  -     glimepiride (AMARYL) 4 MG tablet; TAKE 2 TABLETS DAILY WITH BREAKFAST  Dispense: 180 tablet; Refill: 0  -     lancets 33 gauge Misc; Use as directed to check blood glucose  Dispense: 100 each; Refill: 11    Essential hypertension, benign  Comments:  Continue current medication and diet  Orders:  -     amLODIPine (NORVASC) 10 MG tablet; Take 1 tablet (10 mg total) by mouth once daily.  Dispense: 90 tablet; Refill: 1  -     aspirin 81 MG Chew; Take 1 tablet (81 mg total) by mouth once daily.  Dispense: 90 tablet; Refill: 1  -     magnesium oxide 420 mg Tab; Take 1 tablet by mouth once daily.  Dispense:  90 each; Refill: 1  -     lisinopriL (PRINIVIL,ZESTRIL) 40 MG tablet; Take 1 tablet (40 mg total) by mouth once daily.  Dispense: 90 tablet; Refill: 1    Hyperlipidemia associated with type 2 diabetes mellitus  Comments:   continue current medication and LFLC diet. LDL goal < 70  Orders:  -     atorvastatin (LIPITOR) 80 MG tablet; Take 1 tablet (80 mg total) by mouth once daily.  Dispense: 90 tablet; Refill: 1    Vitamin D deficiency  -     cholecalciferol, vitamin D3, (VITAMIN D3) 25 mcg (1,000 unit) capsule; Take 1 capsule (1,000 Units total) by mouth once daily.  Dispense: 12 capsule; Refill: 1    Hypothyroidism, unspecified type  Comments:  TSH ordered; will adjust medication again if needed. Refer to Endocrinology.  Orders:  -     TSH; Future; Expected date: 09/12/2022    Other orders  -     blood sugar diagnostic Strp; 100 strips by Misc.(Non-Drug; Combo Route) route once daily. Check blood sugar daily E11.9  Dispense: 100 each; Refill: 11     Health Maintenance Topics with due status: Not Due       Topic Last Completion Date    Lipid Panel 06/15/2022    Low Dose Statin 09/12/2022    Diabetes Urine Screening 09/12/2022    Foot Exam 09/12/2022    Hemoglobin A1c 09/12/2022       Procedures         Follow up in about 3 months (around 12/12/2022).     Signature:  ANDREA Sommers    Date of encounter: 9/12/22

## 2022-09-13 LAB
CREAT UR-MCNC: 66 MG/DL (ref 39–259)
MICROALBUMIN UR-MCNC: 2.4 MG/DL (ref 0–2.8)
MICROALBUMIN/CREAT RATIO PNL UR: 36.4 MG/G (ref 0–30)

## 2022-09-18 DIAGNOSIS — Z79.4 TYPE 2 DIABETES MELLITUS WITH DIABETIC POLYNEUROPATHY, WITH LONG-TERM CURRENT USE OF INSULIN: ICD-10-CM

## 2022-09-18 DIAGNOSIS — E11.42 TYPE 2 DIABETES MELLITUS WITH DIABETIC POLYNEUROPATHY, WITH LONG-TERM CURRENT USE OF INSULIN: ICD-10-CM

## 2022-09-18 RX ORDER — INSULIN GLARGINE 100 [IU]/ML
50 INJECTION, SOLUTION SUBCUTANEOUS NIGHTLY
Qty: 3 EACH | Refills: 2
Start: 2022-09-18 | End: 2022-12-05 | Stop reason: SDUPTHER

## 2022-09-30 DIAGNOSIS — E55.9 VITAMIN D DEFICIENCY: ICD-10-CM

## 2022-09-30 RX ORDER — VIT C/E/ZN/COPPR/LUTEIN/ZEAXAN 250MG-90MG
1000 CAPSULE ORAL DAILY
Qty: 90 CAPSULE | Refills: 1 | Status: SHIPPED | OUTPATIENT
Start: 2022-09-30 | End: 2022-12-05 | Stop reason: SDUPTHER

## 2022-10-10 ENCOUNTER — OFFICE VISIT (OUTPATIENT)
Dept: FAMILY MEDICINE | Facility: CLINIC | Age: 57
End: 2022-10-10
Payer: OTHER GOVERNMENT

## 2022-10-10 VITALS
HEART RATE: 67 BPM | DIASTOLIC BLOOD PRESSURE: 82 MMHG | HEIGHT: 69 IN | RESPIRATION RATE: 18 BRPM | OXYGEN SATURATION: 98 % | BODY MASS INDEX: 32.61 KG/M2 | SYSTOLIC BLOOD PRESSURE: 118 MMHG

## 2022-10-10 DIAGNOSIS — R68.83 CHILLS (WITHOUT FEVER): ICD-10-CM

## 2022-10-10 DIAGNOSIS — J01.00 ACUTE NON-RECURRENT MAXILLARY SINUSITIS: Primary | ICD-10-CM

## 2022-10-10 PROCEDURE — 99213 OFFICE O/P EST LOW 20 MIN: CPT | Mod: 25,,, | Performed by: NURSE PRACTITIONER

## 2022-10-10 PROCEDURE — 99213 PR OFFICE/OUTPT VISIT, EST, LEVL III, 20-29 MIN: ICD-10-PCS | Mod: 25,,, | Performed by: NURSE PRACTITIONER

## 2022-10-10 PROCEDURE — 87428 POCT SARS-COV2 (COVID) WITH FLU ANTIGEN: ICD-10-PCS | Mod: QW,,, | Performed by: NURSE PRACTITIONER

## 2022-10-10 PROCEDURE — 96372 PR INJECTION,THERAP/PROPH/DIAG2ST, IM OR SUBCUT: ICD-10-PCS | Mod: ,,, | Performed by: NURSE PRACTITIONER

## 2022-10-10 PROCEDURE — 96372 THER/PROPH/DIAG INJ SC/IM: CPT | Mod: ,,, | Performed by: NURSE PRACTITIONER

## 2022-10-10 PROCEDURE — 87428 SARSCOV & INF VIR A&B AG IA: CPT | Mod: QW,,, | Performed by: NURSE PRACTITIONER

## 2022-10-10 RX ORDER — CEFTRIAXONE 1 G/1
1 INJECTION, POWDER, FOR SOLUTION INTRAMUSCULAR; INTRAVENOUS
Status: COMPLETED | OUTPATIENT
Start: 2022-10-10 | End: 2022-10-10

## 2022-10-10 RX ORDER — LEVOFLOXACIN 500 MG/1
500 TABLET, FILM COATED ORAL DAILY
Qty: 7 TABLET | Refills: 0 | Status: SHIPPED | OUTPATIENT
Start: 2022-10-10 | End: 2022-12-05

## 2022-10-10 RX ADMIN — CEFTRIAXONE 1 G: 1 INJECTION, POWDER, FOR SOLUTION INTRAMUSCULAR; INTRAVENOUS at 10:10

## 2022-10-10 NOTE — PROGRESS NOTES
ANDREA Sommers   [facility]  [address]  [place] [address]     PATIENT NAME: James Mcclellan  : 1965  DATE: 10/10/22  MRN: [id number]      Billing Provider: ANDREA Sommers  Level of Service: OR OFFICE/OUTPT VISIT, EST, LEVL III, 20-29 MIN  Patient PCP Information       Provider PCP Type    ANDREA Sommers General            Reason for Visit / Chief Complaint: Sinusitis (Nasal congested x 6 days; get sob walking short distance), Cough (Some productive cough-yellow), and Chills (Denies fever,vomiting and diarrhea, headache or body aches/Some nausea and loss of taste/Tried OTC Motrin without much relief and states that is wife has been sick)       Update PCP  Update Chief Complaint         History of Present Illness / Problem Focused Workflow     James Mcclellan presents to the clinic c/o sinus congestion, productive cough with yellow sputum, chills. No known fever and has been taking OTC medication that has not helped.      Review of Systems     Review of Systems   Constitutional: Negative.  Negative for activity change, appetite change, chills, fatigue, fever and unexpected weight change.   HENT:  Positive for nasal congestion and postnasal drip. Negative for ear pain and trouble swallowing.    Eyes:  Negative for visual disturbance.   Respiratory:  Negative for cough, chest tightness and shortness of breath.    Cardiovascular:  Negative for chest pain and leg swelling.   Gastrointestinal:  Negative for abdominal pain, blood in stool, change in bowel habit, nausea, vomiting and change in bowel habit.   Endocrine: Negative for cold intolerance, heat intolerance, polydipsia, polyphagia and polyuria.   Genitourinary:  Negative for difficulty urinating and frequency.   Integumentary:  Negative for rash.   Neurological:  Negative for dizziness, weakness and headaches.      Medical / Social / Family History     Past Medical History:   Diagnosis Date    Bell's palsy 10/15/2018     Left Side of Face    Degenerative cervical disc     GERD (gastroesophageal reflux disease)     History of COVID-19 01/19/2021    Hypertension     Hypothyroidism     Neck pain     Type 2 diabetes mellitus        Past Surgical History:   Procedure Laterality Date    ANTERIOR CRUCIATE LIGAMENT REPAIR      CHOLECYSTECTOMY      SINUS SURGERY         Social History    reports that he has never smoked. He has never used smokeless tobacco. He reports that he does not drink alcohol and does not use drugs.    Family History  's family history includes Diabetes in his father and sister; Heart disease in his father; Hypertension in his brother, father, mother, and sister.    Medications and Allergies     Medications  Outpatient Medications Marked as Taking for the 10/10/22 encounter (Office Visit) with ANDREA Vilchis   Medication Sig Dispense Refill    amLODIPine (NORVASC) 10 MG tablet Take 1 tablet (10 mg total) by mouth once daily. 90 tablet 1    aspirin 81 MG Chew Take 1 tablet (81 mg total) by mouth once daily. 90 tablet 1    atorvastatin (LIPITOR) 80 MG tablet Take 1 tablet (80 mg total) by mouth once daily. 90 tablet 1    cholecalciferol, vitamin D3, (VITAMIN D3) 25 mcg (1,000 unit) capsule Take 1 capsule (1,000 Units total) by mouth once daily. 90 capsule 1    empagliflozin (JARDIANCE) 25 mg tablet Take 1 tablet (25 mg total) by mouth once daily. 90 tablet 0    gabapentin (NEURONTIN) 300 MG capsule Take 2 capsules (600 mg total) by mouth 3 (three) times daily. 270 capsule 1    glimepiride (AMARYL) 4 MG tablet TAKE 2 TABLETS DAILY WITH BREAKFAST 180 tablet 0    insulin (LANTUS SOLOSTAR U-100 INSULIN) glargine 100 units/mL SubQ pen Inject 50 Units into the skin every evening. 3 each 2    lancets 33 gauge Misc Use as directed to check blood glucose 100 each 11    levothyroxine (SYNTHROID) 200 MCG tablet Take 1 tablet (200 mcg total) by mouth before breakfast. 90 tablet 1    lisinopriL (PRINIVIL,ZESTRIL) 40  "MG tablet Take 1 tablet (40 mg total) by mouth once daily. 90 tablet 1    magnesium oxide 420 mg Tab Take 1 tablet by mouth once daily. 90 each 1    omega-3 acid ethyl esters (LOVAZA) 1 gram capsule Take 2 capsules (2 g total) by mouth 2 (two) times daily. 360 capsule 1    omeprazole (PRILOSEC) 20 MG capsule Take 1 capsule (20 mg total) by mouth once daily. 90 capsule 1     Current Facility-Administered Medications for the 10/10/22 encounter (Office Visit) with ANDREA Vilchis   Medication Dose Route Frequency Provider Last Rate Last Admin    cefTRIAXone injection 1 g  1 g Intramuscular 1 time in Clinic/HOD ANDREA Vilchis           Allergies  Review of patient's allergies indicates:  No Known Allergies    Physical Examination     Vitals:    10/10/22 1009   BP: 118/82   Pulse: 67   Resp: 18   SpO2: 98%   Height: 5' 9" (1.753 m)      Physical Exam  Constitutional:       Appearance: Normal appearance.   HENT:      Head: Normocephalic.      Right Ear: Hearing and ear canal normal. No tenderness. Tympanic membrane is not injected.      Left Ear: Hearing and ear canal normal. No tenderness. Tympanic membrane is not injected.      Nose: Congestion and rhinorrhea present. No nasal deformity.      Right Turbinates: Not swollen.      Left Turbinates: Not swollen.      Right Sinus: Maxillary sinus tenderness present.      Left Sinus: Maxillary sinus tenderness present.      Mouth/Throat:      Lips: Pink.      Mouth: Mucous membranes are moist.      Pharynx: No oropharyngeal exudate or posterior oropharyngeal erythema.      Tonsils: No tonsillar exudate.   Eyes:      General: Lids are normal. No allergic shiner.        Right eye: No discharge.         Left eye: No discharge.      Conjunctiva/sclera:      Right eye: Right conjunctiva is not injected.      Left eye: Left conjunctiva is not injected.   Neck:      Trachea: Trachea normal.   Cardiovascular:      Rate and Rhythm: Normal rate and regular rhythm.      " Pulses: Normal pulses.      Heart sounds: Normal heart sounds.   Pulmonary:      Effort: Pulmonary effort is normal. No respiratory distress.      Breath sounds: Normal breath sounds. No stridor. No wheezing, rhonchi or rales.   Abdominal:      Palpations: Abdomen is soft.   Musculoskeletal:      Cervical back: Neck supple.   Lymphadenopathy:      Cervical: Cervical adenopathy present.   Skin:     General: Skin is warm and dry.      Capillary Refill: Capillary refill takes less than 2 seconds.   Neurological:      Mental Status: He is alert.        Assessment and Plan (including Health Maintenance)      Problem List  Smart Sets  Document Outside HM   :        Health Maintenance Due   Topic Date Due    COVID-19 Vaccine (1) Never done    Eye Exam  Never done    Pneumococcal Vaccines (Age 0-64) (2 - PCV) 11/03/2015    Shingles Vaccine (1 of 2) Never done       Problem List Items Addressed This Visit    None  Visit Diagnoses       Acute non-recurrent maxillary sinusitis    -  Primary    Rapid COVID and influenza negative; will treat with rocephin injection along with levaquin for 7 days.     Relevant Medications    cefTRIAXone injection 1 g    levoFLOXacin (LEVAQUIN) 500 MG tablet    Chills (without fever)        Relevant Orders    POCT SARS-COV2 (COVID) with Flu Antigen (Completed)          Acute non-recurrent maxillary sinusitis  Comments:  Rapid COVID and influenza negative; will treat with rocephin injection along with levaquin for 7 days.   Orders:  -     cefTRIAXone injection 1 g  -     levoFLOXacin (LEVAQUIN) 500 MG tablet; Take 1 tablet (500 mg total) by mouth once daily.  Dispense: 7 tablet; Refill: 0    Chills (without fever)  -     POCT SARS-COV2 (COVID) with Flu Antigen     Health Maintenance Topics with due status: Not Due       Topic Last Completion Date    Lipid Panel 06/15/2022    Diabetes Urine Screening 09/12/2022    Foot Exam 09/12/2022    Hemoglobin A1c 09/12/2022    Low Dose Statin 10/10/2022        Procedures         Follow up in about 1 week (around 10/17/2022), or if symptoms worsen or fail to improve.     Signature:  ANDREA Sommers    Date of encounter: 10/10/22

## 2022-12-05 ENCOUNTER — OFFICE VISIT (OUTPATIENT)
Dept: FAMILY MEDICINE | Facility: CLINIC | Age: 57
End: 2022-12-05
Payer: OTHER GOVERNMENT

## 2022-12-05 VITALS
RESPIRATION RATE: 18 BRPM | BODY MASS INDEX: 33.12 KG/M2 | OXYGEN SATURATION: 96 % | SYSTOLIC BLOOD PRESSURE: 120 MMHG | HEART RATE: 89 BPM | HEIGHT: 69 IN | WEIGHT: 223.63 LBS | DIASTOLIC BLOOD PRESSURE: 78 MMHG

## 2022-12-05 DIAGNOSIS — E55.9 VITAMIN D DEFICIENCY: ICD-10-CM

## 2022-12-05 DIAGNOSIS — E78.5 HYPERLIPIDEMIA ASSOCIATED WITH TYPE 2 DIABETES MELLITUS: ICD-10-CM

## 2022-12-05 DIAGNOSIS — E11.42 TYPE 2 DIABETES MELLITUS WITH DIABETIC POLYNEUROPATHY, WITH LONG-TERM CURRENT USE OF INSULIN: ICD-10-CM

## 2022-12-05 DIAGNOSIS — E03.9 HYPOTHYROIDISM, UNSPECIFIED TYPE: ICD-10-CM

## 2022-12-05 DIAGNOSIS — Z79.4 TYPE 2 DIABETES MELLITUS WITH DIABETIC POLYNEUROPATHY, WITH LONG-TERM CURRENT USE OF INSULIN: ICD-10-CM

## 2022-12-05 DIAGNOSIS — E11.69 HYPERLIPIDEMIA ASSOCIATED WITH TYPE 2 DIABETES MELLITUS: ICD-10-CM

## 2022-12-05 DIAGNOSIS — K21.9 GASTROESOPHAGEAL REFLUX DISEASE, UNSPECIFIED WHETHER ESOPHAGITIS PRESENT: ICD-10-CM

## 2022-12-05 DIAGNOSIS — I10 ESSENTIAL HYPERTENSION, BENIGN: Primary | ICD-10-CM

## 2022-12-05 LAB
ALBUMIN SERPL BCP-MCNC: 3.7 G/DL (ref 3.5–5)
ALBUMIN/GLOB SERPL: 0.9 {RATIO}
ALP SERPL-CCNC: 87 U/L (ref 45–115)
ALT SERPL W P-5'-P-CCNC: 48 U/L (ref 16–61)
ANION GAP SERPL CALCULATED.3IONS-SCNC: 12 MMOL/L (ref 7–16)
AST SERPL W P-5'-P-CCNC: 33 U/L (ref 15–37)
BILIRUB SERPL-MCNC: 1.5 MG/DL (ref ?–1.2)
BUN SERPL-MCNC: 17 MG/DL (ref 7–18)
BUN/CREAT SERPL: 18 (ref 6–20)
CALCIUM SERPL-MCNC: 8.7 MG/DL (ref 8.5–10.1)
CHLORIDE SERPL-SCNC: 100 MMOL/L (ref 98–107)
CHOLEST SERPL-MCNC: 129 MG/DL (ref 0–200)
CHOLEST/HDLC SERPL: 4 {RATIO}
CO2 SERPL-SCNC: 30 MMOL/L (ref 21–32)
CREAT SERPL-MCNC: 0.95 MG/DL (ref 0.7–1.3)
EGFR (NO RACE VARIABLE) (RUSH/TITUS): 94 ML/MIN/1.73M²
EST. AVERAGE GLUCOSE BLD GHB EST-MCNC: 247 MG/DL
GLOBULIN SER-MCNC: 4.1 G/DL (ref 2–4)
GLUCOSE SERPL-MCNC: 292 MG/DL (ref 74–106)
HBA1C MFR BLD HPLC: 10 % (ref 4.5–6.6)
HDLC SERPL-MCNC: 32 MG/DL (ref 40–60)
LDLC SERPL CALC-MCNC: 48 MG/DL
LDLC/HDLC SERPL: 1.5 {RATIO}
NONHDLC SERPL-MCNC: 97 MG/DL
POTASSIUM SERPL-SCNC: 3.6 MMOL/L (ref 3.5–5.1)
PROT SERPL-MCNC: 7.8 G/DL (ref 6.4–8.2)
SODIUM SERPL-SCNC: 138 MMOL/L (ref 136–145)
TRIGL SERPL-MCNC: 246 MG/DL (ref 35–150)
TSH SERPL DL<=0.005 MIU/L-ACNC: 19.3 UIU/ML (ref 0.36–3.74)
VLDLC SERPL-MCNC: 49 MG/DL

## 2022-12-05 PROCEDURE — 80053 COMPREHENSIVE METABOLIC PANEL: ICD-10-PCS | Mod: ,,, | Performed by: CLINICAL MEDICAL LABORATORY

## 2022-12-05 PROCEDURE — 80053 COMPREHEN METABOLIC PANEL: CPT | Mod: ,,, | Performed by: CLINICAL MEDICAL LABORATORY

## 2022-12-05 PROCEDURE — 84443 ASSAY THYROID STIM HORMONE: CPT | Mod: ,,, | Performed by: CLINICAL MEDICAL LABORATORY

## 2022-12-05 PROCEDURE — 84443 TSH: ICD-10-PCS | Mod: ,,, | Performed by: CLINICAL MEDICAL LABORATORY

## 2022-12-05 PROCEDURE — 83036 HEMOGLOBIN GLYCOSYLATED A1C: CPT | Mod: ,,, | Performed by: CLINICAL MEDICAL LABORATORY

## 2022-12-05 PROCEDURE — 80061 LIPID PANEL: ICD-10-PCS | Mod: ,,, | Performed by: CLINICAL MEDICAL LABORATORY

## 2022-12-05 PROCEDURE — 80061 LIPID PANEL: CPT | Mod: ,,, | Performed by: CLINICAL MEDICAL LABORATORY

## 2022-12-05 PROCEDURE — 99214 OFFICE O/P EST MOD 30 MIN: CPT | Mod: ,,, | Performed by: NURSE PRACTITIONER

## 2022-12-05 PROCEDURE — 83036 HEMOGLOBIN A1C: ICD-10-PCS | Mod: ,,, | Performed by: CLINICAL MEDICAL LABORATORY

## 2022-12-05 PROCEDURE — 99214 PR OFFICE/OUTPT VISIT, EST, LEVL IV, 30-39 MIN: ICD-10-PCS | Mod: ,,, | Performed by: NURSE PRACTITIONER

## 2022-12-05 RX ORDER — AMLODIPINE BESYLATE 10 MG/1
10 TABLET ORAL DAILY
Qty: 90 TABLET | Refills: 1 | Status: SHIPPED | OUTPATIENT
Start: 2022-12-05 | End: 2023-07-17 | Stop reason: SDUPTHER

## 2022-12-05 RX ORDER — GLIMEPIRIDE 4 MG/1
TABLET ORAL
Qty: 180 TABLET | Refills: 0 | Status: SHIPPED | OUTPATIENT
Start: 2022-12-05 | End: 2023-03-20 | Stop reason: SDUPTHER

## 2022-12-05 RX ORDER — INSULIN GLARGINE 100 [IU]/ML
50 INJECTION, SOLUTION SUBCUTANEOUS NIGHTLY
Qty: 3 EACH | Refills: 2 | Status: SHIPPED | OUTPATIENT
Start: 2022-12-05 | End: 2022-12-09

## 2022-12-05 RX ORDER — OMEGA-3-ACID ETHYL ESTERS 1 G/1
2 CAPSULE, LIQUID FILLED ORAL 2 TIMES DAILY
Qty: 360 CAPSULE | Refills: 1 | Status: SHIPPED | OUTPATIENT
Start: 2022-12-05 | End: 2023-07-17 | Stop reason: SDUPTHER

## 2022-12-05 RX ORDER — OMEPRAZOLE 20 MG/1
20 CAPSULE, DELAYED RELEASE ORAL DAILY
Qty: 90 CAPSULE | Refills: 1 | Status: SHIPPED | OUTPATIENT
Start: 2022-12-05 | End: 2023-07-17 | Stop reason: SDUPTHER

## 2022-12-05 RX ORDER — ATORVASTATIN CALCIUM 80 MG/1
80 TABLET, FILM COATED ORAL DAILY
Qty: 90 TABLET | Refills: 1 | Status: SHIPPED | OUTPATIENT
Start: 2022-12-05 | End: 2023-07-17 | Stop reason: SDUPTHER

## 2022-12-05 RX ORDER — VIT C/E/ZN/COPPR/LUTEIN/ZEAXAN 250MG-90MG
1000 CAPSULE ORAL DAILY
Qty: 90 CAPSULE | Refills: 1 | Status: SHIPPED | OUTPATIENT
Start: 2022-12-05 | End: 2023-07-17 | Stop reason: SDUPTHER

## 2022-12-05 RX ORDER — NAPROXEN SODIUM 220 MG/1
81 TABLET, FILM COATED ORAL DAILY
Qty: 90 TABLET | Refills: 1 | Status: SHIPPED | OUTPATIENT
Start: 2022-12-05 | End: 2023-07-17 | Stop reason: SDUPTHER

## 2022-12-05 RX ORDER — GABAPENTIN 300 MG/1
600 CAPSULE ORAL 3 TIMES DAILY
Qty: 270 CAPSULE | Refills: 1 | Status: SHIPPED | OUTPATIENT
Start: 2022-12-05 | End: 2023-03-20 | Stop reason: SDUPTHER

## 2022-12-05 RX ORDER — LISINOPRIL 40 MG/1
40 TABLET ORAL DAILY
Qty: 90 TABLET | Refills: 1 | Status: SHIPPED | OUTPATIENT
Start: 2022-12-05 | End: 2024-01-09

## 2022-12-05 RX ORDER — LEVOTHYROXINE SODIUM 200 UG/1
200 TABLET ORAL
Qty: 90 TABLET | Refills: 1 | Status: SHIPPED | OUTPATIENT
Start: 2022-12-05 | End: 2023-07-17 | Stop reason: SDUPTHER

## 2022-12-05 RX ORDER — MAGNESIUM OXIDE 420 MG/1
1 TABLET ORAL DAILY
Qty: 90 EACH | Refills: 1 | Status: SHIPPED | OUTPATIENT
Start: 2022-12-05 | End: 2024-01-09 | Stop reason: SDUPTHER

## 2022-12-05 NOTE — PROGRESS NOTES
ANDREA Sommers   [facility]  [address]  [place] [address]     PATIENT NAME: James Mcclellan  : 1965  DATE: 22  MRN: [id number]      Billing Provider: ANDREA Sommers  Level of Service: [place] OFFICE/OUTPT VISIT, EST, LEVL IV, 30-39 MIN  Patient PCP Information       Provider PCP Type    ANDREA Sommers General            Reason for Visit / Chief Complaint: Follow-up (Here for a check up and refill on medications), Hypertension, and Diabetes       Update PCP  Update Chief Complaint         History of Present Illness / Problem Focused Workflow     James Mcclellan presents to the clinic for follow up on HTN and DM. Does not check blood sugar routinely.  Has appt with Endocrinologist in [place] next week for follow up on hypothyroidism and DM      Review of Systems     Review of Systems   Constitutional: Negative.  Negative for activity change, appetite change, chills, fatigue, fever and unexpected weight change.   Eyes:  Negative for visual disturbance.   Respiratory:  Negative for cough, chest tightness and shortness of breath.    Cardiovascular:  Negative for chest pain and leg swelling.   Gastrointestinal:  Negative for abdominal pain, blood in stool, change in bowel habit, nausea, vomiting and change in bowel habit.   Endocrine: Negative for cold intolerance, heat intolerance, polydipsia, polyphagia and polyuria.   Genitourinary:  Negative for frequency and urgency.   Musculoskeletal:  Negative for back pain.   Integumentary:  Negative for rash.   Neurological:  Positive for numbness (and pain in prashanth feet). Negative for dizziness, weakness and headaches.   Psychiatric/Behavioral:  Negative for sleep disturbance.       Medical / Social / Family History     Past Medical History:   Diagnosis Date    Bell's palsy 10/15/2018    Left Side of Face    Degenerative cervical disc     GERD (gastroesophageal reflux disease)     History of COVID-19 2021    Hypertension      Hypothyroidism     Neck pain     Type 2 diabetes mellitus        Past Surgical History:   Procedure Laterality Date    ANTERIOR CRUCIATE LIGAMENT REPAIR      CHOLECYSTECTOMY      SINUS SURGERY         Social History    reports that he has never smoked. He has never used smokeless tobacco. He reports that he does not drink alcohol and does not use drugs.    Family History  's family history includes Diabetes in his father and sister; Heart disease in his father; Hypertension in his brother, father, mother, and sister.    Medications and Allergies     Medications  Outpatient Medications Marked as Taking for the 12/5/22 encounter (Office Visit) with ANDREA Vilchis   Medication Sig Dispense Refill    blood sugar diagnostic Strp 100 strips by Misc.(Non-Drug; Combo Route) route once daily. Check blood sugar daily E11.9 100 each 11    lancets 33 gauge Misc Use as directed to check blood glucose 100 each 11    [DISCONTINUED] amLODIPine (NORVASC) 10 MG tablet Take 1 tablet (10 mg total) by mouth once daily. 90 tablet 1    [DISCONTINUED] aspirin 81 MG Chew Take 1 tablet (81 mg total) by mouth once daily. 90 tablet 1    [DISCONTINUED] atorvastatin (LIPITOR) 80 MG tablet Take 1 tablet (80 mg total) by mouth once daily. 90 tablet 1    [DISCONTINUED] cholecalciferol, vitamin D3, (VITAMIN D3) 25 mcg (1,000 unit) capsule Take 1 capsule (1,000 Units total) by mouth once daily. 90 capsule 1    [DISCONTINUED] empagliflozin (JARDIANCE) 25 mg tablet Take 1 tablet (25 mg total) by mouth once daily. 90 tablet 0    [DISCONTINUED] gabapentin (NEURONTIN) 300 MG capsule Take 2 capsules (600 mg total) by mouth 3 (three) times daily. 270 capsule 1    [DISCONTINUED] glimepiride (AMARYL) 4 MG tablet TAKE 2 TABLETS DAILY WITH BREAKFAST 180 tablet 0    [DISCONTINUED] insulin (LANTUS SOLOSTAR U-100 INSULIN) glargine 100 units/mL SubQ pen Inject 50 Units into the skin every evening. 3 each 2    [DISCONTINUED] levothyroxine  "(SYNTHROID) 200 MCG tablet Take 1 tablet (200 mcg total) by mouth before breakfast. 90 tablet 1    [DISCONTINUED] lisinopriL (PRINIVIL,ZESTRIL) 40 MG tablet Take 1 tablet (40 mg total) by mouth once daily. 90 tablet 1    [DISCONTINUED] magnesium oxide 420 mg Tab Take 1 tablet by mouth once daily. 90 each 1    [DISCONTINUED] omega-3 acid ethyl esters (LOVAZA) 1 gram capsule Take 2 capsules (2 g total) by mouth 2 (two) times daily. 360 capsule 1    [DISCONTINUED] omeprazole (PRILOSEC) 20 MG capsule Take 1 capsule (20 mg total) by mouth once daily. 90 capsule 1       Allergies  Review of patient's allergies indicates:  No Known Allergies    Physical Examination     Vitals:    12/05/22 1541   BP: 120/78   Pulse: 89   Resp: 18   SpO2: 96%   Weight: 101.4 kg (223 lb 9.6 oz)   Height: 5' 9" (1.753 m)      Physical Exam  Constitutional:       General: He is not in acute distress.     Appearance: Normal appearance.   Eyes:      General: No scleral icterus.     Conjunctiva/sclera: Conjunctivae normal.      Pupils: Pupils are equal, round, and reactive to light.   Neck:      Thyroid: No thyromegaly.      Vascular: No carotid bruit.   Cardiovascular:      Rate and Rhythm: Normal rate and regular rhythm.      Pulses: Normal pulses.      Heart sounds: Normal heart sounds. No murmur heard.  Pulmonary:      Effort: Pulmonary effort is normal. No accessory muscle usage or respiratory distress.      Breath sounds: Normal breath sounds. No wheezing, rhonchi or rales.   Abdominal:      General: Bowel sounds are normal. There is no distension.      Palpations: Abdomen is soft.      Tenderness: There is no abdominal tenderness.   Musculoskeletal:         General: Normal range of motion.      Cervical back: Neck supple.   Skin:     General: Skin is warm and dry.      Capillary Refill: Capillary refill takes less than 2 seconds.      Coloration: Skin is not jaundiced or pale.   Neurological:      Mental Status: He is alert and oriented to " person, place, and time.      Cranial Nerves: No cranial nerve deficit.      Sensory: No sensory deficit.      Gait: Gait is intact.        Assessment and Plan (including Health Maintenance)      Problem List  Smart Sets  Document Outside HM   :  Lab Results   Component Value Date    HGBA1C 9.4 (H) 09/12/2022     Lab Results   Component Value Date    CHOL 112 06/15/2022    CHOL 91 09/20/2021    CHOL 137 06/21/2021     Lab Results   Component Value Date    HDL 35 (L) 06/15/2022    HDL 36 (L) 09/20/2021    HDL 37 (L) 06/21/2021     Lab Results   Component Value Date    LDLCALC 57 06/15/2022    LDLCALC 40 09/20/2021    LDLCALC 62 06/21/2021     Lab Results   Component Value Date    TRIG 98 06/15/2022    TRIG 76 09/20/2021    TRIG 191 (H) 06/21/2021     Lab Results   Component Value Date    CHOLHDL 3.2 06/15/2022    CHOLHDL 2.5 09/20/2021    CHOLHDL 3.7 06/21/2021     Lab Results   Component Value Date    TSH 23.600 (H) 09/12/2022     CMP  Sodium   Date Value Ref Range Status   06/15/2022 137 136 - 145 mmol/L Final     Potassium   Date Value Ref Range Status   06/15/2022 4.0 3.5 - 5.1 mmol/L Final     Chloride   Date Value Ref Range Status   06/15/2022 104 98 - 107 mmol/L Final     CO2   Date Value Ref Range Status   06/15/2022 27 21 - 32 mmol/L Final     Glucose   Date Value Ref Range Status   06/15/2022 261 (H) 74 - 106 mg/dL Final     BUN   Date Value Ref Range Status   06/15/2022 9 7 - 18 mg/dL Final     Creatinine   Date Value Ref Range Status   06/15/2022 0.68 (L) 0.70 - 1.30 mg/dL Final     Calcium   Date Value Ref Range Status   06/15/2022 8.1 (L) 8.5 - 10.1 mg/dL Final     Total Protein   Date Value Ref Range Status   06/15/2022 7.0 6.4 - 8.2 g/dL Final     Albumin   Date Value Ref Range Status   06/15/2022 3.4 (L) 3.5 - 5.0 g/dL Final     Bilirubin, Total   Date Value Ref Range Status   06/15/2022 2.3 (H) 0.0 - 1.2 mg/dL Final     Alk Phos   Date Value Ref Range Status   06/15/2022 81 45 - 115 U/L Final      AST   Date Value Ref Range Status   06/15/2022 27 15 - 37 U/L Final     ALT   Date Value Ref Range Status   06/15/2022 43 16 - 61 U/L Final     Anion Gap   Date Value Ref Range Status   06/15/2022 10 7 - 16 mmol/L Final             Health Maintenance Due   Topic Date Due    COVID-19 Vaccine (1) Never done    Pneumococcal Vaccines (Age 0-64) (2 - PCV) 11/03/2015    Eye Exam  01/05/2016    Shingles Vaccine (1 of 2) Never done    Hemoglobin A1c  12/12/2022       Assessment:      Essential hypertension, benign  Comments:  CMP ordered. Continue current medication and diet  Orders:  -     amLODIPine (NORVASC) 10 MG tablet; Take 1 tablet (10 mg total) by mouth once daily.  Dispense: 90 tablet; Refill: 1  -     aspirin 81 MG Chew; Take 1 tablet (81 mg total) by mouth once daily.  Dispense: 90 tablet; Refill: 1  -     lisinopriL (PRINIVIL,ZESTRIL) 40 MG tablet; Take 1 tablet (40 mg total) by mouth once daily.  Dispense: 90 tablet; Refill: 1  -     magnesium oxide 420 mg Tab; Take 1 tablet by mouth once daily.  Dispense: 90 each; Refill: 1  -     Comprehensive Metabolic Panel; Future; Expected date: 12/05/2022    Hyperlipidemia associated with type 2 diabetes mellitus  Comments:  Fasting lipids ordered. Continue current medication and LFLC diet. LDL goal < 70  Orders:  -     atorvastatin (LIPITOR) 80 MG tablet; Take 1 tablet (80 mg total) by mouth once daily.  Dispense: 90 tablet; Refill: 1  -     omega-3 acid ethyl esters (LOVAZA) 1 gram capsule; Take 2 capsules (2 g total) by mouth 2 (two) times daily.  Dispense: 360 capsule; Refill: 1  -     Lipid Panel; Future; Expected date: 12/05/2022    Vitamin D deficiency  Comments:  Continue current medication  Orders:  -     cholecalciferol, vitamin D3, (VITAMIN D3) 25 mcg (1,000 unit) capsule; Take 1 capsule (1,000 Units total) by mouth once daily.  Dispense: 90 capsule; Refill: 1    Type 2 diabetes mellitus with diabetic polyneuropathy, with long-term current use of  insulin  Comments:  Hgb A1c ordered. Continue current medication and diabetic diet. Hgb A1c goal < 7.0. Keep appt with Endocrinology  Orders:  -     empagliflozin (JARDIANCE) 25 mg tablet; Take 1 tablet (25 mg total) by mouth once daily.  Dispense: 90 tablet; Refill: 0  -     gabapentin (NEURONTIN) 300 MG capsule; Take 2 capsules (600 mg total) by mouth 3 (three) times daily.  Dispense: 270 capsule; Refill: 1  -     glimepiride (AMARYL) 4 MG tablet; TAKE 2 TABLETS DAILY WITH BREAKFAST  Dispense: 180 tablet; Refill: 0  -     insulin (LANTUS SOLOSTAR U-100 INSULIN) glargine 100 units/mL SubQ pen; Inject 50 Units into the skin every evening.  Dispense: 3 each; Refill: 2  -     Hemoglobin A1C; Future; Expected date: 12/05/2022    Hypothyroidism, unspecified type  Comments:  TSH ordered; continue current medication. take medication on empty stomach  Orders:  -     levothyroxine (SYNTHROID) 200 MCG tablet; Take 1 tablet (200 mcg total) by mouth before breakfast.  Dispense: 90 tablet; Refill: 1  -     TSH; Future; Expected date: 12/05/2022    Gastroesophageal reflux disease, unspecified whether esophagitis present  Comments:  Continue current medication   Orders:  -     omeprazole (PRILOSEC) 20 MG capsule; Take 1 capsule (20 mg total) by mouth once daily.  Dispense: 90 capsule; Refill: 1     Health Maintenance Topics with due status: Not Due       Topic Last Completion Date    Lipid Panel 06/15/2022    Diabetes Urine Screening 09/12/2022    Foot Exam 09/12/2022    Low Dose Statin 12/05/2022       Procedures          Follow up in about 3 months (around 3/5/2023).     Signature:  ANDREA Sommers    Date of encounter: 12/5/22

## 2022-12-09 DIAGNOSIS — E11.42 TYPE 2 DIABETES MELLITUS WITH DIABETIC POLYNEUROPATHY, WITH LONG-TERM CURRENT USE OF INSULIN: ICD-10-CM

## 2022-12-09 DIAGNOSIS — Z79.4 TYPE 2 DIABETES MELLITUS WITH DIABETIC POLYNEUROPATHY, WITH LONG-TERM CURRENT USE OF INSULIN: ICD-10-CM

## 2022-12-09 RX ORDER — INSULIN GLARGINE 100 [IU]/ML
55 INJECTION, SOLUTION SUBCUTANEOUS NIGHTLY
Qty: 3 EACH | Refills: 2
Start: 2022-12-09 | End: 2023-03-20 | Stop reason: SDUPTHER

## 2023-01-30 LAB
LEFT EYE DM RETINOPATHY: POSITIVE
RIGHT EYE DM RETINOPATHY: POSITIVE

## 2023-02-08 ENCOUNTER — PATIENT OUTREACH (OUTPATIENT)
Dept: FAMILY MEDICINE | Facility: CLINIC | Age: 58
End: 2023-02-08
Payer: OTHER GOVERNMENT

## 2023-03-01 ENCOUNTER — CLINICAL SUPPORT (OUTPATIENT)
Dept: PRIMARY CARE CLINIC | Facility: CLINIC | Age: 58
End: 2023-03-01

## 2023-03-01 DIAGNOSIS — Z02.89 ENCOUNTER FOR PHYSICAL EXAMINATION RELATED TO EMPLOYMENT: Primary | ICD-10-CM

## 2023-03-01 DIAGNOSIS — Z02.83 ENCOUNTER FOR DRUG SCREENING: ICD-10-CM

## 2023-03-01 PROCEDURE — 99499 PR PHYSICAL - BASIC NON DOT/CDL: ICD-10-PCS | Mod: ,,, | Performed by: NURSE PRACTITIONER

## 2023-03-01 PROCEDURE — 99499 UNLISTED E&M SERVICE: CPT | Mod: ,,, | Performed by: NURSE PRACTITIONER

## 2023-03-01 PROCEDURE — 99000 SPECIMEN HANDLING OFFICE-LAB: CPT | Mod: ,,, | Performed by: NURSE PRACTITIONER

## 2023-03-01 PROCEDURE — 99000 PR SPECIMEN HANDLING,DR OFF->LAB: ICD-10-PCS | Mod: ,,, | Performed by: NURSE PRACTITIONER

## 2023-03-01 NOTE — PROGRESS NOTES
Subjective:       Patient ID: James Mcclellan is a 57 y.o. male.    Chief Complaint: No chief complaint on file.    HPI  Review of Systems      Objective:      Physical Exam    Assessment:       Problem List Items Addressed This Visit    None  Visit Diagnoses       Encounter for physical examination related to employment    -  Primary    Encounter for drug screening                Plan:       See scanned documents in .

## 2023-03-06 ENCOUNTER — LAB VISIT (OUTPATIENT)
Dept: PRIMARY CARE CLINIC | Facility: CLINIC | Age: 58
End: 2023-03-06

## 2023-03-06 DIAGNOSIS — Z11.1 SCREENING-PULMONARY TB: Primary | ICD-10-CM

## 2023-03-06 NOTE — PROGRESS NOTES
Subjective:       Patient ID: James Mcclellan is a 57 y.o. male.    Chief Complaint: No chief complaint on file.    HPI  Review of Systems      Objective:      Physical Exam    Assessment:       Problem List Items Addressed This Visit    None  Visit Diagnoses       Screening-pulmonary TB    -  Primary    Relevant Orders    POCT TB Skin Test Read (Completed)              Plan:       No charges, bills to [facility]

## 2023-03-15 ENCOUNTER — LAB VISIT (OUTPATIENT)
Dept: PRIMARY CARE CLINIC | Facility: CLINIC | Age: 58
End: 2023-03-15

## 2023-03-15 DIAGNOSIS — Z11.1 SCREENING-PULMONARY TB: Primary | ICD-10-CM

## 2023-03-15 NOTE — PROGRESS NOTES
Subjective:       Patient ID: James Mcclellan is a 57 y.o. male.    Chief Complaint: No chief complaint on file.    HPI  Review of Systems      Objective:      Physical Exam    Assessment:       Problem List Items Addressed This Visit    None  Visit Diagnoses       Screening-pulmonary TB    -  Primary    Relevant Orders    POCT TB Skin Test Read              Plan:       TB skin test only    No charges, community service, [facility]

## 2023-03-17 LAB
TB INDURATION - 48 HR READ: NORMAL
TB INDURATION - 72 HR READ: NORMAL
TB SKIN TEST - 48 HR READ: NEGATIVE
TB SKIN TEST - 72 HR READ: NORMAL

## 2023-03-20 ENCOUNTER — OFFICE VISIT (OUTPATIENT)
Dept: FAMILY MEDICINE | Facility: CLINIC | Age: 58
End: 2023-03-20
Payer: OTHER GOVERNMENT

## 2023-03-20 VITALS
DIASTOLIC BLOOD PRESSURE: 60 MMHG | OXYGEN SATURATION: 95 % | HEIGHT: 69 IN | RESPIRATION RATE: 18 BRPM | BODY MASS INDEX: 33.68 KG/M2 | SYSTOLIC BLOOD PRESSURE: 94 MMHG | WEIGHT: 227.38 LBS | TEMPERATURE: 98 F | HEART RATE: 80 BPM

## 2023-03-20 DIAGNOSIS — I10 ESSENTIAL HYPERTENSION, BENIGN: ICD-10-CM

## 2023-03-20 DIAGNOSIS — E11.42 DIABETIC POLYNEUROPATHY ASSOCIATED WITH TYPE 2 DIABETES MELLITUS: ICD-10-CM

## 2023-03-20 DIAGNOSIS — E11.42 TYPE 2 DIABETES MELLITUS WITH DIABETIC POLYNEUROPATHY, WITH LONG-TERM CURRENT USE OF INSULIN: Primary | ICD-10-CM

## 2023-03-20 DIAGNOSIS — E11.69 HYPERLIPIDEMIA ASSOCIATED WITH TYPE 2 DIABETES MELLITUS: ICD-10-CM

## 2023-03-20 DIAGNOSIS — Z79.4 TYPE 2 DIABETES MELLITUS WITH DIABETIC POLYNEUROPATHY, WITH LONG-TERM CURRENT USE OF INSULIN: ICD-10-CM

## 2023-03-20 DIAGNOSIS — E78.5 HYPERLIPIDEMIA ASSOCIATED WITH TYPE 2 DIABETES MELLITUS: ICD-10-CM

## 2023-03-20 DIAGNOSIS — E03.9 HYPOTHYROIDISM, UNSPECIFIED TYPE: ICD-10-CM

## 2023-03-20 DIAGNOSIS — E11.42 TYPE 2 DIABETES MELLITUS WITH DIABETIC POLYNEUROPATHY, WITH LONG-TERM CURRENT USE OF INSULIN: ICD-10-CM

## 2023-03-20 DIAGNOSIS — Z79.4 TYPE 2 DIABETES MELLITUS WITH DIABETIC POLYNEUROPATHY, WITH LONG-TERM CURRENT USE OF INSULIN: Primary | ICD-10-CM

## 2023-03-20 LAB
ALBUMIN SERPL BCP-MCNC: 3.7 G/DL (ref 3.5–5)
ALBUMIN/GLOB SERPL: 0.9 {RATIO}
ALP SERPL-CCNC: 71 U/L (ref 45–115)
ALT SERPL W P-5'-P-CCNC: 51 U/L (ref 16–61)
ANION GAP SERPL CALCULATED.3IONS-SCNC: 11 MMOL/L (ref 7–16)
AST SERPL W P-5'-P-CCNC: 40 U/L (ref 15–37)
BASOPHILS # BLD AUTO: 0.04 K/UL (ref 0–0.2)
BASOPHILS NFR BLD AUTO: 0.6 % (ref 0–1)
BILIRUB SERPL-MCNC: 1.5 MG/DL (ref ?–1.2)
BUN SERPL-MCNC: 15 MG/DL (ref 7–18)
BUN/CREAT SERPL: 17 (ref 6–20)
CALCIUM SERPL-MCNC: 8.7 MG/DL (ref 8.5–10.1)
CHLORIDE SERPL-SCNC: 105 MMOL/L (ref 98–107)
CHOLEST SERPL-MCNC: 102 MG/DL (ref 0–200)
CHOLEST/HDLC SERPL: 3.4 {RATIO}
CO2 SERPL-SCNC: 27 MMOL/L (ref 21–32)
CREAT SERPL-MCNC: 0.87 MG/DL (ref 0.7–1.3)
DIFFERENTIAL METHOD BLD: ABNORMAL
EGFR (NO RACE VARIABLE) (RUSH/TITUS): 101 ML/MIN/1.73M²
EOSINOPHIL # BLD AUTO: 0.09 K/UL (ref 0–0.5)
EOSINOPHIL NFR BLD AUTO: 1.3 % (ref 1–4)
ERYTHROCYTE [DISTWIDTH] IN BLOOD BY AUTOMATED COUNT: 13 % (ref 11.5–14.5)
EST. AVERAGE GLUCOSE BLD GHB EST-MCNC: 234 MG/DL
GLOBULIN SER-MCNC: 4.1 G/DL (ref 2–4)
GLUCOSE SERPL-MCNC: 164 MG/DL (ref 74–106)
HBA1C MFR BLD HPLC: 9.6 % (ref 4.5–6.6)
HCT VFR BLD AUTO: 44.6 % (ref 40–54)
HDLC SERPL-MCNC: 30 MG/DL (ref 40–60)
HGB BLD-MCNC: 15 G/DL (ref 13.5–18)
IMM GRANULOCYTES # BLD AUTO: 0.02 K/UL (ref 0–0.04)
IMM GRANULOCYTES NFR BLD: 0.3 % (ref 0–0.4)
LDLC SERPL CALC-MCNC: 35 MG/DL
LDLC/HDLC SERPL: 1.2 {RATIO}
LYMPHOCYTES # BLD AUTO: 2.06 K/UL (ref 1–4.8)
LYMPHOCYTES NFR BLD AUTO: 28.6 % (ref 27–41)
MCH RBC QN AUTO: 28.5 PG (ref 27–31)
MCHC RBC AUTO-ENTMCNC: 33.6 G/DL (ref 32–36)
MCV RBC AUTO: 84.8 FL (ref 80–96)
MONOCYTES # BLD AUTO: 0.61 K/UL (ref 0–0.8)
MONOCYTES NFR BLD AUTO: 8.5 % (ref 2–6)
MPC BLD CALC-MCNC: 12.4 FL (ref 9.4–12.4)
NEUTROPHILS # BLD AUTO: 4.38 K/UL (ref 1.8–7.7)
NEUTROPHILS NFR BLD AUTO: 60.7 % (ref 53–65)
NONHDLC SERPL-MCNC: 72 MG/DL
NRBC # BLD AUTO: 0 X10E3/UL
NRBC, AUTO (.00): 0 %
PLATELET # BLD AUTO: 159 K/UL (ref 150–400)
POTASSIUM SERPL-SCNC: 3.8 MMOL/L (ref 3.5–5.1)
PROT SERPL-MCNC: 7.8 G/DL (ref 6.4–8.2)
RBC # BLD AUTO: 5.26 M/UL (ref 4.6–6.2)
SODIUM SERPL-SCNC: 139 MMOL/L (ref 136–145)
TRIGL SERPL-MCNC: 183 MG/DL (ref 35–150)
TSH SERPL DL<=0.005 MIU/L-ACNC: 2.99 UIU/ML (ref 0.36–3.74)
VLDLC SERPL-MCNC: 37 MG/DL
WBC # BLD AUTO: 7.2 K/UL (ref 4.5–11)

## 2023-03-20 PROCEDURE — 84443 TSH: ICD-10-PCS | Mod: ,,, | Performed by: CLINICAL MEDICAL LABORATORY

## 2023-03-20 PROCEDURE — 80053 COMPREHEN METABOLIC PANEL: CPT | Mod: ,,, | Performed by: CLINICAL MEDICAL LABORATORY

## 2023-03-20 PROCEDURE — 84443 ASSAY THYROID STIM HORMONE: CPT | Mod: ,,, | Performed by: CLINICAL MEDICAL LABORATORY

## 2023-03-20 PROCEDURE — 80053 COMPREHENSIVE METABOLIC PANEL: ICD-10-PCS | Mod: ,,, | Performed by: CLINICAL MEDICAL LABORATORY

## 2023-03-20 PROCEDURE — 85025 CBC WITH DIFFERENTIAL: ICD-10-PCS | Mod: ,,, | Performed by: CLINICAL MEDICAL LABORATORY

## 2023-03-20 PROCEDURE — 83036 HEMOGLOBIN GLYCOSYLATED A1C: CPT | Mod: ,,, | Performed by: CLINICAL MEDICAL LABORATORY

## 2023-03-20 PROCEDURE — 99214 PR OFFICE/OUTPT VISIT, EST, LEVL IV, 30-39 MIN: ICD-10-PCS | Mod: ,,, | Performed by: NURSE PRACTITIONER

## 2023-03-20 PROCEDURE — 83036 HEMOGLOBIN A1C: ICD-10-PCS | Mod: ,,, | Performed by: CLINICAL MEDICAL LABORATORY

## 2023-03-20 PROCEDURE — 99214 OFFICE O/P EST MOD 30 MIN: CPT | Mod: ,,, | Performed by: NURSE PRACTITIONER

## 2023-03-20 PROCEDURE — 85025 COMPLETE CBC W/AUTO DIFF WBC: CPT | Mod: ,,, | Performed by: CLINICAL MEDICAL LABORATORY

## 2023-03-20 PROCEDURE — 80061 LIPID PANEL: ICD-10-PCS | Mod: ,,, | Performed by: CLINICAL MEDICAL LABORATORY

## 2023-03-20 PROCEDURE — 80061 LIPID PANEL: CPT | Mod: ,,, | Performed by: CLINICAL MEDICAL LABORATORY

## 2023-03-20 RX ORDER — LEVOTHYROXINE SODIUM 50 UG/1
50 TABLET ORAL
Qty: 90 TABLET | Refills: 0 | Status: SHIPPED | OUTPATIENT
Start: 2023-03-20 | End: 2023-07-17 | Stop reason: SDUPTHER

## 2023-03-20 RX ORDER — LEVOTHYROXINE SODIUM 50 UG/1
50 TABLET ORAL
COMMUNITY
Start: 2023-01-24 | End: 2023-03-20 | Stop reason: SDUPTHER

## 2023-03-20 RX ORDER — INSULIN GLARGINE 100 [IU]/ML
55 INJECTION, SOLUTION SUBCUTANEOUS NIGHTLY
Qty: 3 EACH | Refills: 2
Start: 2023-03-20 | End: 2023-07-19 | Stop reason: SDUPTHER

## 2023-03-20 RX ORDER — INDOMETHACIN 50 MG/1
50 CAPSULE ORAL 3 TIMES DAILY PRN
Qty: 30 CAPSULE | Refills: 0 | Status: SHIPPED | OUTPATIENT
Start: 2023-03-20 | End: 2023-06-08

## 2023-03-20 RX ORDER — GABAPENTIN 300 MG/1
600 CAPSULE ORAL 3 TIMES DAILY
Qty: 270 CAPSULE | Refills: 1 | Status: SHIPPED | OUTPATIENT
Start: 2023-03-20 | End: 2024-01-30 | Stop reason: SDUPTHER

## 2023-03-20 RX ORDER — GLIMEPIRIDE 4 MG/1
TABLET ORAL
Qty: 180 TABLET | Refills: 0 | Status: SHIPPED | OUTPATIENT
Start: 2023-03-20 | End: 2023-07-17 | Stop reason: SDUPTHER

## 2023-03-20 RX ORDER — ISOPROPYL ALCOHOL 70 ML/100ML
1 SWAB TOPICAL 3 TIMES DAILY
Qty: 300 EACH | Refills: 1 | Status: SHIPPED | OUTPATIENT
Start: 2023-03-20

## 2023-03-20 NOTE — PROGRESS NOTES
Jessika De Leon NP   [facility]  [address]  [place]  [address]      PATIENT NAME: James Mcclellan  : 1965  DATE: 3/20/23  MRN: [id number]      Billing Provider: Jessika De Leon NP  Level of Service:   Patient PCP Information       Provider PCP Type    Jessika De Leon NP General            Reason for Visit / Chief Complaint: Diabetes (Here for check up and refills.), Hyperlipidemia (Here for check up and refills.), Hypertension (Here for check up and refills.), Hypothyroidism (Here for check up and refills.), and Hand Pain (Reports right hand pain for the last month and a half.  Area around right thumb appears swollen.  States he has ongoing problems with this.)       Update PCP  Update Chief Complaint         History of Present Illness / Problem Focused Workflow     James Mcclellan presents to the clinic with Diabetes (Here for check up and refills.), Hyperlipidemia (Here for check up and refills.), Hypertension (Here for check up and refills.), Hypothyroidism (Here for check up and refills.), and Hand Pain (Reports right hand pain for the last month and a half.  Area around right thumb appears swollen.  States he has ongoing problems with this.)     57 year old male presents to clinic for check up and refills. He has PMH of Type 2 DM with neuropathy, GERD, HTN, Hypothyroidism, EDGAR,  Mild nonproliferative diabetic retinopathy of both eyes without macular edema associated with type 2 diabetes mellitus,  Bell's palsy, and Degenerative cervical disc. He reports his blood pressure has been running low (around 100/50s) and he is interested in decreasing some of his medication. He reports he checks his sugar at least daily and fasting has been around 120. He has complaints of right thumb pain which he reports has been doing on for about a month and a half. He states he has had issues with this thumb in past and was told it was arthritis. He states he has had injections in this right thumb  in past.       Review of Systems     @Review of Systems   Constitutional:  Negative for activity change, appetite change, fatigue and fever.   HENT:  Negative for nasal congestion, ear pain, rhinorrhea, sinus pressure/congestion and sore throat.    Eyes:  Negative for pain, redness, visual disturbance and eye dryness.   Respiratory:  Negative for cough and shortness of breath.    Cardiovascular:  Negative for chest pain and leg swelling.   Gastrointestinal:  Negative for abdominal distention, abdominal pain, constipation and diarrhea.   Endocrine: Negative for cold intolerance, heat intolerance and polyuria.   Genitourinary:  Negative for bladder incontinence, dysuria, frequency and urgency.   Musculoskeletal:  Positive for arthralgias. Negative for gait problem and myalgias.   Integumentary:  Negative for color change, rash and wound.   Allergic/Immunologic: Negative for environmental allergies and food allergies.   Neurological:  Negative for dizziness, weakness, light-headedness and headaches.   Psychiatric/Behavioral:  Negative for behavioral problems and sleep disturbance.      Medical / Social / Family History     Past Medical History:   Diagnosis Date    Bell's palsy 10/15/2018    Left Side of Face    Degenerative cervical disc     GERD (gastroesophageal reflux disease)     History of COVID-19 01/19/2021    Hypertension     Hypothyroidism     Mild nonproliferative diabetic retinopathy of both eyes without macular edema associated with type 2 diabetes mellitus 01/30/2023    Neck pain     Regular astigmatism of both eyes     From eye exam on 01/30/2023    Type 2 diabetes mellitus        Past Surgical History:   Procedure Laterality Date    ANTERIOR CRUCIATE LIGAMENT REPAIR      CHOLECYSTECTOMY      SINUS SURGERY         Social History    reports that he has never smoked. He has been exposed to tobacco smoke. He has never used smokeless tobacco. He reports that he does not drink alcohol and does  not use drugs.    Family History  's family history includes Diabetes in his father and sister; Heart disease in his father; Hypertension in his brother, father, mother, and sister.    Medications and Allergies     Medications  Outpatient Medications Marked as Taking for the 3/20/23 encounter (Office Visit) with Jessika De Leon NP   Medication Sig Dispense Refill    amLODIPine (NORVASC) 10 MG tablet Take 1 tablet (10 mg total) by mouth once daily. 90 tablet 1    aspirin 81 MG Chew Take 1 tablet (81 mg total) by mouth once daily. 90 tablet 1    atorvastatin (LIPITOR) 80 MG tablet Take 1 tablet (80 mg total) by mouth once daily. 90 tablet 1    blood sugar diagnostic Strp 100 strips by Misc.(Non-Drug; Combo Route) route once daily. Check blood sugar daily E11.9 100 each 11    cholecalciferol, vitamin D3, (VITAMIN D3) 25 mcg (1,000 unit) capsule Take 1 capsule (1,000 Units total) by mouth once daily. 90 capsule 1    empagliflozin (JARDIANCE) 25 mg tablet Take 1 tablet (25 mg total) by mouth once daily. 90 tablet 0    gabapentin (NEURONTIN) 300 MG capsule Take 2 capsules (600 mg total) by mouth 3 (three) times daily. 270 capsule 1    glimepiride (AMARYL) 4 MG tablet TAKE 2 TABLETS DAILY WITH BREAKFAST 180 tablet 0    insulin (LANTUS SOLOSTAR U-100 INSULIN) glargine 100 units/mL SubQ pen Inject 55 Units into the skin every evening. 3 each 2    lancets 33 gauge Misc Use as directed to check blood glucose 100 each 11    levothyroxine (SYNTHROID) 200 MCG tablet Take 1 tablet (200 mcg total) by mouth before breakfast. 90 tablet 1    levothyroxine (SYNTHROID) 50 MCG tablet 50 mcg.      lisinopriL (PRINIVIL,ZESTRIL) 40 MG tablet Take 1 tablet (40 mg total) by mouth once daily. 90 tablet 1    magnesium oxide 420 mg Tab Take 1 tablet by mouth once daily. 90 each 1    omega-3 acid ethyl esters (LOVAZA) 1 gram capsule Take 2 capsules (2 g total) by mouth 2 (two) times daily. 360 capsule 1    omeprazole  (PRILOSEC) 20 MG capsule Take 1 capsule (20 mg total) by mouth once daily. 90 capsule 1       Allergies  Review of patient's allergies indicates:  No Known Allergies    Physical Examination     Vitals:    03/20/23 1348   BP: 94/60   Pulse: 80   Resp: 18   Temp: 97.8 °F (36.6 °C)     Physical Exam  Vitals and nursing note reviewed.   Constitutional:       Appearance: He is obese.   HENT:      Head: Normocephalic.      Right Ear: Tympanic membrane normal.      Left Ear: Tympanic membrane normal.      Nose: Nose normal.      Mouth/Throat:      Mouth: Mucous membranes are moist.      Pharynx: Oropharynx is clear. No posterior oropharyngeal erythema.   Eyes:      Conjunctiva/sclera: Conjunctivae normal.   Cardiovascular:      Rate and Rhythm: Normal rate and regular rhythm.      Pulses: Normal pulses.      Heart sounds: Normal heart sounds.   Pulmonary:      Effort: Pulmonary effort is normal.      Breath sounds: Normal breath sounds.   Abdominal:      General: Abdomen is flat. Bowel sounds are normal. There is no distension.      Palpations: Abdomen is soft.   Musculoskeletal:         General: No swelling or tenderness.      Right hand: Bony tenderness present. Decreased range of motion.      Cervical back: Normal range of motion.      Right lower leg: No edema.      Left lower leg: No edema.   Skin:     General: Skin is warm and dry.      Capillary Refill: Capillary refill takes less than 2 seconds.   Neurological:      Mental Status: He is alert. Mental status is at baseline.   Psychiatric:         Mood and Affect: Mood normal.         Behavior: Behavior normal.             Lab Results   Component Value Date    WBC 5.86 06/15/2022    HGB 14.2 06/15/2022    HCT 42.3 06/15/2022    MCV 87.9 06/15/2022     (L) 06/15/2022          Sodium   Date Value Ref Range Status   12/05/2022 138 136 - 145 mmol/L Final     Potassium   Date Value Ref Range Status   12/05/2022 3.6 3.5 - 5.1 mmol/L Final     Chloride   Date Value  Ref Range Status   12/05/2022 100 98 - 107 mmol/L Final     CO2   Date Value Ref Range Status   12/05/2022 30 21 - 32 mmol/L Final     Glucose   Date Value Ref Range Status   12/05/2022 292 (H) 74 - 106 mg/dL Final     BUN   Date Value Ref Range Status   12/05/2022 17 7 - 18 mg/dL Final     Creatinine   Date Value Ref Range Status   12/05/2022 0.95 0.70 - 1.30 mg/dL Final     Calcium   Date Value Ref Range Status   12/05/2022 8.7 8.5 - 10.1 mg/dL Final     Total Protein   Date Value Ref Range Status   12/05/2022 7.8 6.4 - 8.2 g/dL Final     Albumin   Date Value Ref Range Status   12/05/2022 3.7 3.5 - 5.0 g/dL Final     Bilirubin, Total   Date Value Ref Range Status   12/05/2022 1.5 (H) >0.0 - 1.2 mg/dL Final     Alk Phos   Date Value Ref Range Status   12/05/2022 87 45 - 115 U/L Final     AST   Date Value Ref Range Status   12/05/2022 33 15 - 37 U/L Final     ALT   Date Value Ref Range Status   12/05/2022 48 16 - 61 U/L Final     Anion Gap   Date Value Ref Range Status   12/05/2022 12 7 - 16 mmol/L Final     eGFR   Date Value Ref Range Status   06/15/2022 129 >=60 mL/min/1.73m² Final      No image results found.     Procedures   Assessment and Plan (including Health Maintenance)      Problem List  Smart Sets  Document Outside HM   :    Plan:           Problem List Items Addressed This Visit    None  Visit Diagnoses       Type 2 diabetes mellitus with diabetic polyneuropathy, with long-term current use of insulin    -  Primary    Essential hypertension, benign        Hyperlipidemia associated with type 2 diabetes mellitus        Hypothyroidism, unspecified type                Health Maintenance Topics with due status: Not Due       Topic Last Completion Date    Diabetes Urine Screening 09/12/2022    Foot Exam 09/12/2022    Low Dose Statin 12/05/2022    Lipid Panel 12/05/2022    Eye Exam 01/30/2023       Future Appointments   Date Time Provider Department Center   3/27/2023 10:00 AM Jessika De Leon NP [facility]  [facility]        Health Maintenance Due   Topic Date Due    COVID-19 Vaccine (1) Never done    Pneumococcal Vaccines (Age 0-64) (2 - PCV) 11/03/2015    Shingles Vaccine (1 of 2) Never done    Hemoglobin A1c  03/05/2023        No follow-ups on file.     Signature:  Jessika De Leon NP  [address]  [address]    Date of encounter: 3/20/23

## 2023-03-22 NOTE — PROGRESS NOTES
Labs reviewed: TSH normal, CBC normal. Glucose was elevated in clinic at 164 and Hgb A1C was 9.6- this is way too high. Goal A1C is 7. Increase Lantus by 2 units every 2 days until fasting glucose less than 120. Work hard on low carb/no concentrated sweet diet. Increase exercise. Follow up in 3 months for recheck of A1C.

## 2023-05-11 PROBLEM — Z79.4 TYPE 2 DIABETES MELLITUS WITH DIABETIC POLYNEUROPATHY, WITH LONG-TERM CURRENT USE OF INSULIN: Status: ACTIVE | Noted: 2022-06-15

## 2023-05-11 PROBLEM — I10 ESSENTIAL HYPERTENSION, BENIGN: Status: ACTIVE | Noted: 2023-05-11

## 2023-05-11 NOTE — ASSESSMENT & PLAN NOTE
BP low at today's visit and patient reports it has been running low at home. He was instructed to start taking half tab of his Lisinopril 40mg for dose of 20mg daily. He states he has plenty of these at home and does not need refill. Encouraged to continue to check BP at home and keep log. Follow up in 1 month.

## 2023-05-11 NOTE — ASSESSMENT & PLAN NOTE
HgbA1C obtained at today's visit. Goal is less than 7. Continue current meds and low carb/no concentrated sweets diet with increased exercise as tolerated. Will follow up with lab results. Patient to follow up in 3 months or as needed.

## 2023-05-31 ENCOUNTER — HOSPITAL ENCOUNTER (EMERGENCY)
Facility: HOSPITAL | Age: 58
Discharge: HOME | End: 2023-06-01
Payer: OTHER GOVERNMENT

## 2023-05-31 DIAGNOSIS — R42 DIZZINESS: ICD-10-CM

## 2023-05-31 DIAGNOSIS — R42 LIGHTHEADEDNESS: ICD-10-CM

## 2023-05-31 DIAGNOSIS — R07.9 CHEST PAIN: ICD-10-CM

## 2023-05-31 DIAGNOSIS — R79.89 ELEVATED TSH: ICD-10-CM

## 2023-05-31 DIAGNOSIS — R11.2 NAUSEA AND VOMITING, UNSPECIFIED VOMITING TYPE: Primary | ICD-10-CM

## 2023-05-31 DIAGNOSIS — J32.2 ETHMOID SINUSITIS, UNSPECIFIED CHRONICITY: ICD-10-CM

## 2023-05-31 LAB
ALBUMIN SERPL BCP-MCNC: 4 G/DL (ref 3.5–5)
ALBUMIN/GLOB SERPL: 1.1 {RATIO}
ALP SERPL-CCNC: 88 U/L (ref 45–115)
ALT SERPL W P-5'-P-CCNC: 43 U/L (ref 16–61)
ANION GAP SERPL CALCULATED.3IONS-SCNC: 15 MMOL/L (ref 7–16)
AST SERPL W P-5'-P-CCNC: 39 U/L (ref 15–37)
BASOPHILS # BLD AUTO: 0 K/UL (ref 0–0.2)
BASOPHILS NFR BLD AUTO: 0 % (ref 0–1)
BILIRUB SERPL-MCNC: 1.7 MG/DL (ref ?–1.2)
BILIRUB UR QL STRIP: NEGATIVE
BUN SERPL-MCNC: 23 MG/DL (ref 7–18)
BUN/CREAT SERPL: 29 (ref 6–20)
CALCIUM SERPL-MCNC: 8.9 MG/DL (ref 8.5–10.1)
CHLORIDE SERPL-SCNC: 102 MMOL/L (ref 98–107)
CLARITY UR: CLEAR
CO2 SERPL-SCNC: 27 MMOL/L (ref 21–32)
COLOR UR: YELLOW
CREAT SERPL-MCNC: 0.8 MG/DL (ref 0.7–1.3)
DIFFERENTIAL METHOD BLD: ABNORMAL
EGFR (NO RACE VARIABLE) (RUSH/TITUS): 104 ML/MIN/1.73M2
EOSINOPHIL # BLD AUTO: 0.03 K/UL (ref 0–0.5)
EOSINOPHIL NFR BLD AUTO: 0.3 % (ref 1–4)
ERYTHROCYTE [DISTWIDTH] IN BLOOD BY AUTOMATED COUNT: 13.8 % (ref 11.5–14.5)
FLUAV AG UPPER RESP QL IA.RAPID: NEGATIVE
FLUBV AG UPPER RESP QL IA.RAPID: NEGATIVE
GLOBULIN SER-MCNC: 3.8 G/DL (ref 2–4)
GLUCOSE SERPL-MCNC: 107 MG/DL (ref 74–106)
GLUCOSE UR STRIP-MCNC: >=1000 MG/DL
HCT VFR BLD AUTO: 45.4 % (ref 40–54)
HGB BLD-MCNC: 15.2 G/DL (ref 13.5–18)
KETONES UR STRIP-SCNC: ABNORMAL MG/DL
LEUKOCYTE ESTERASE UR QL STRIP: NEGATIVE
LYMPHOCYTES # BLD AUTO: 0.25 K/UL (ref 1–4.8)
LYMPHOCYTES NFR BLD AUTO: 2.7 % (ref 27–41)
LYMPHOCYTES NFR BLD MANUAL: 3 % (ref 27–41)
MAGNESIUM SERPL-MCNC: 1.8 MG/DL (ref 1.7–2.3)
MCH RBC QN AUTO: 29.4 PG (ref 27–31)
MCHC RBC AUTO-ENTMCNC: 33.5 G/DL (ref 32–36)
MCV RBC AUTO: 87.8 FL (ref 80–96)
MONOCYTES # BLD AUTO: 0.31 K/UL (ref 0–0.8)
MONOCYTES NFR BLD AUTO: 3.3 % (ref 2–6)
MONOCYTES NFR BLD MANUAL: 2 % (ref 2–6)
MPC BLD CALC-MCNC: 12.1 FL (ref 9.4–12.4)
NEUTROPHILS # BLD AUTO: 8.76 K/UL (ref 1.8–7.7)
NEUTROPHILS NFR BLD AUTO: 93.7 % (ref 53–65)
NEUTS SEG NFR BLD MANUAL: 95 % (ref 50–62)
NITRITE UR QL STRIP: NEGATIVE
NRBC BLD MANUAL-RTO: ABNORMAL %
NT-PROBNP SERPL-MCNC: 37 PG/ML (ref 1–125)
PH UR STRIP: 6 PH UNITS
PLATELET # BLD AUTO: 112 K/UL (ref 150–400)
POTASSIUM SERPL-SCNC: 3.7 MMOL/L (ref 3.5–5.1)
PROT SERPL-MCNC: 7.8 G/DL (ref 6.4–8.2)
PROT UR QL STRIP: NEGATIVE
RAPID GROUP A STREP: NEGATIVE
RBC # BLD AUTO: 5.17 M/UL (ref 4.6–6.2)
RBC # UR STRIP: NEGATIVE /UL
SARS-COV+SARS-COV-2 AG RESP QL IA.RAPID: NEGATIVE
SODIUM SERPL-SCNC: 140 MMOL/L (ref 136–145)
SP GR UR STRIP: 1.01
TROPONIN I SERPL DL<=0.01 NG/ML-MCNC: 5.4 PG/ML
TROPONIN I SERPL DL<=0.01 NG/ML-MCNC: 5.9 PG/ML
TSH SERPL DL<=0.005 MIU/L-ACNC: 5.25 UIU/ML (ref 0.36–3.74)
UROBILINOGEN UR STRIP-ACNC: 1 MG/DL
WBC # BLD AUTO: 9.35 K/UL (ref 4.5–11)

## 2023-05-31 PROCEDURE — 93005 ELECTROCARDIOGRAM TRACING: CPT

## 2023-05-31 PROCEDURE — 99284 PR EMERGENCY DEPT VISIT,LEVEL IV: ICD-10-PCS | Mod: ,,, | Performed by: REGISTERED NURSE

## 2023-05-31 PROCEDURE — 83880 ASSAY OF NATRIURETIC PEPTIDE: CPT | Performed by: REGISTERED NURSE

## 2023-05-31 PROCEDURE — 81003 URINALYSIS AUTO W/O SCOPE: CPT | Performed by: REGISTERED NURSE

## 2023-05-31 PROCEDURE — 80307 DRUG TEST PRSMV CHEM ANLYZR: CPT | Performed by: REGISTERED NURSE

## 2023-05-31 PROCEDURE — 99284 EMERGENCY DEPT VISIT MOD MDM: CPT | Mod: ,,, | Performed by: REGISTERED NURSE

## 2023-05-31 PROCEDURE — 84484 ASSAY OF TROPONIN QUANT: CPT | Performed by: REGISTERED NURSE

## 2023-05-31 PROCEDURE — 83735 ASSAY OF MAGNESIUM: CPT | Performed by: REGISTERED NURSE

## 2023-05-31 PROCEDURE — 99285 EMERGENCY DEPT VISIT HI MDM: CPT | Mod: 25

## 2023-05-31 PROCEDURE — 96361 HYDRATE IV INFUSION ADD-ON: CPT

## 2023-05-31 PROCEDURE — 25000003 PHARM REV CODE 250: Performed by: REGISTERED NURSE

## 2023-05-31 PROCEDURE — 87428 SARSCOV & INF VIR A&B AG IA: CPT | Performed by: REGISTERED NURSE

## 2023-05-31 PROCEDURE — 84443 ASSAY THYROID STIM HORMONE: CPT | Performed by: REGISTERED NURSE

## 2023-05-31 PROCEDURE — 87880 STREP A ASSAY W/OPTIC: CPT | Performed by: REGISTERED NURSE

## 2023-05-31 PROCEDURE — 85025 COMPLETE CBC W/AUTO DIFF WBC: CPT | Performed by: REGISTERED NURSE

## 2023-05-31 PROCEDURE — 93010 EKG 12-LEAD: ICD-10-PCS | Mod: ,,, | Performed by: INTERNAL MEDICINE

## 2023-05-31 PROCEDURE — 63600175 PHARM REV CODE 636 W HCPCS: Performed by: REGISTERED NURSE

## 2023-05-31 PROCEDURE — 80053 COMPREHEN METABOLIC PANEL: CPT | Performed by: REGISTERED NURSE

## 2023-05-31 PROCEDURE — 93010 ELECTROCARDIOGRAM REPORT: CPT | Mod: ,,, | Performed by: INTERNAL MEDICINE

## 2023-05-31 PROCEDURE — 96375 TX/PRO/DX INJ NEW DRUG ADDON: CPT

## 2023-05-31 RX ORDER — SODIUM CHLORIDE 9 MG/ML
1000 INJECTION, SOLUTION INTRAVENOUS
Status: COMPLETED | OUTPATIENT
Start: 2023-05-31 | End: 2023-05-31

## 2023-05-31 RX ORDER — ONDANSETRON 2 MG/ML
4 INJECTION INTRAMUSCULAR; INTRAVENOUS
Status: COMPLETED | OUTPATIENT
Start: 2023-05-31 | End: 2023-05-31

## 2023-05-31 RX ADMIN — SODIUM CHLORIDE 1000 ML: 9 INJECTION, SOLUTION INTRAVENOUS at 09:05

## 2023-05-31 RX ADMIN — ONDANSETRON 4 MG: 2 INJECTION INTRAMUSCULAR; INTRAVENOUS at 09:05

## 2023-05-31 NOTE — Clinical Note
Marly Mcclellan accompanied their spouse to the emergency department on 5/31/2023. They may return to work on 06/02/2023.      If you have any questions or concerns, please don't hesitate to call.      DHAVAL MerchantC

## 2023-05-31 NOTE — Clinical Note
"James Rogel (David)en was seen and treated in our emergency department on 5/31/2023.  He may return to work on 06/03/2023.       If you have any questions or concerns, please don't hesitate to call.      Natalie Nelson NP-C"

## 2023-06-01 ENCOUNTER — TELEPHONE (OUTPATIENT)
Dept: EMERGENCY MEDICINE | Facility: HOSPITAL | Age: 58
End: 2023-06-01
Payer: OTHER GOVERNMENT

## 2023-06-01 VITALS
DIASTOLIC BLOOD PRESSURE: 72 MMHG | SYSTOLIC BLOOD PRESSURE: 114 MMHG | TEMPERATURE: 101 F | HEIGHT: 69 IN | HEART RATE: 92 BPM | WEIGHT: 210 LBS | RESPIRATION RATE: 17 BRPM | OXYGEN SATURATION: 98 % | BODY MASS INDEX: 31.1 KG/M2

## 2023-06-01 PROBLEM — R11.2 NAUSEA AND VOMITING: Status: ACTIVE | Noted: 2023-06-01

## 2023-06-01 PROBLEM — R07.9 CHEST PAIN: Status: ACTIVE | Noted: 2023-06-01

## 2023-06-01 PROBLEM — R79.89 ELEVATED TSH: Status: ACTIVE | Noted: 2023-06-01

## 2023-06-01 PROBLEM — R42 DIZZINESS: Status: ACTIVE | Noted: 2023-06-01

## 2023-06-01 PROBLEM — J32.2 ETHMOID SINUSITIS: Status: ACTIVE | Noted: 2023-06-01

## 2023-06-01 LAB
AMPHET UR QL SCN: NEGATIVE
BARBITURATES UR QL SCN: NEGATIVE
BENZODIAZ METAB UR QL SCN: NEGATIVE
CANNABINOIDS UR QL SCN: NEGATIVE
COCAINE UR QL SCN: NEGATIVE
OPIATES UR QL SCN: NEGATIVE
PCP UR QL SCN: NEGATIVE

## 2023-06-01 PROCEDURE — 25000003 PHARM REV CODE 250: Performed by: REGISTERED NURSE

## 2023-06-01 PROCEDURE — 63600175 PHARM REV CODE 636 W HCPCS: Performed by: REGISTERED NURSE

## 2023-06-01 PROCEDURE — 96361 HYDRATE IV INFUSION ADD-ON: CPT

## 2023-06-01 PROCEDURE — 96365 THER/PROPH/DIAG IV INF INIT: CPT

## 2023-06-01 RX ORDER — ONDANSETRON 4 MG/1
4 TABLET, FILM COATED ORAL EVERY 6 HOURS
Qty: 12 TABLET | Refills: 0 | Status: SHIPPED | OUTPATIENT
Start: 2023-06-01 | End: 2023-06-08

## 2023-06-01 RX ORDER — ACETAMINOPHEN 500 MG
1000 TABLET ORAL
Status: COMPLETED | OUTPATIENT
Start: 2023-06-01 | End: 2023-06-01

## 2023-06-01 RX ORDER — LEVOFLOXACIN 500 MG/1
500 TABLET, FILM COATED ORAL DAILY
Qty: 10 TABLET | Refills: 0 | Status: SHIPPED | OUTPATIENT
Start: 2023-06-01 | End: 2023-06-11

## 2023-06-01 RX ADMIN — SODIUM CHLORIDE 1000 ML: 9 INJECTION, SOLUTION INTRAVENOUS at 12:06

## 2023-06-01 RX ADMIN — ACETAMINOPHEN 1000 MG: 500 TABLET ORAL at 02:06

## 2023-06-01 RX ADMIN — PROMETHAZINE HYDROCHLORIDE 25 MG: 25 INJECTION INTRAMUSCULAR; INTRAVENOUS at 12:06

## 2023-06-01 NOTE — ED PROVIDER NOTES
"Encounter Date: 5/31/2023       History     Chief Complaint   Patient presents with    Chills    Chest Pain    Weakness    Nausea    Vomiting     Chest tightness, chills, nausea, vomited x 1, weakness since 4:30.       James Mcclellan is a 56 yo WM that presents per [facility] with c/o midsternal non radiating chest pain that is intermittent and tight in nature.  Rates pain 7/10.  He denies personal cardiac history but states "it does run in my family".  He denies SOB or diaphoresis.  States he did vomit x 1 and 1 BM that started out as diarrhea then became normal.  PTA.  States he was lightheaded and dizzy.  He denies a known fever but does report chills.      The history is provided by the patient and the EMS personnel.   Review of patient's allergies indicates:  No Known Allergies  Past Medical History:   Diagnosis Date    Bell's palsy 10/15/2018    Left Side of Face    Degenerative cervical disc     GERD (gastroesophageal reflux disease)     History of COVID-19 01/19/2021    Hypertension     Hypothyroidism     Mild nonproliferative diabetic retinopathy of both eyes without macular edema associated with type 2 diabetes mellitus 01/30/2023    Neck pain     Regular astigmatism of both eyes     From eye exam on 01/30/2023    Type 2 diabetes mellitus      Past Surgical History:   Procedure Laterality Date    ANTERIOR CRUCIATE LIGAMENT REPAIR      CHOLECYSTECTOMY      SINUS SURGERY       Family History   Problem Relation Age of Onset    Hypertension Mother     Diabetes Father     Heart disease Father     Hypertension Father     Diabetes Sister     Hypertension Sister     Hypertension Brother      Social History     Tobacco Use    Smoking status: Never     Passive exposure: Current    Smokeless tobacco: Never   Substance Use Topics    Alcohol use: Never    Drug use: Never     Review of Systems   Constitutional:  Positive for chills. Negative for diaphoresis and fever.   HENT: Negative.     Eyes: Negative.  "   Respiratory:  Positive for chest tightness. Negative for shortness of breath.    Cardiovascular:  Positive for chest pain.   Gastrointestinal:  Positive for diarrhea, nausea and vomiting. Negative for abdominal pain.   Genitourinary: Negative.    Musculoskeletal: Negative.    Skin: Negative.    Neurological:  Positive for dizziness, weakness and light-headedness. Negative for numbness and headaches.     Physical Exam     Initial Vitals [05/31/23 2000]   BP Pulse Resp Temp SpO2   (!) 89/71 88 13 (!) 100.4 °F (38 °C) 96 %      MAP       --         Physical Exam    Constitutional: He appears well-developed and well-nourished. He is not diaphoretic. No distress.   HENT:   Head: Normocephalic and atraumatic.   Right Ear: External ear normal.   Left Ear: External ear normal.   Nose: Nose normal.   Mouth/Throat: Oropharynx is clear and moist and mucous membranes are normal. No oropharyngeal exudate.   Eyes: Conjunctivae and EOM are normal. Pupils are equal, round, and reactive to light.   Neck: Neck supple.   Normal range of motion.  Cardiovascular:  Normal rate, regular rhythm, normal heart sounds and intact distal pulses.           Pulmonary/Chest: Breath sounds normal. No respiratory distress.   Abdominal: Abdomen is soft. Bowel sounds are normal. He exhibits no distension. There is no abdominal tenderness.   Musculoskeletal:         General: No edema. Normal range of motion.      Cervical back: Normal range of motion and neck supple.     Lymphadenopathy:     He has no cervical adenopathy.   Neurological: He is alert and oriented to person, place, and time. He has normal strength. GCS score is 15. GCS eye subscore is 4. GCS verbal subscore is 5. GCS motor subscore is 6.   Skin: Skin is warm and dry. Capillary refill takes less than 2 seconds.   Psychiatric: He has a normal mood and affect. His behavior is normal. Judgment and thought content normal.       Medical Screening Exam   See Full Note    ED Course    Procedures  Labs Reviewed   COMPREHENSIVE METABOLIC PANEL - Abnormal; Notable for the following components:       Result Value    Glucose 107 (*)     BUN 23 (*)     BUN/Creatinine Ratio 29 (*)     Bilirubin, Total 1.7 (*)     AST 39 (*)     All other components within normal limits   TSH - Abnormal; Notable for the following components:    TSH 5.251 (*)     All other components within normal limits   CBC WITH DIFFERENTIAL - Abnormal; Notable for the following components:    Platelet Count 112 (*)     Neutrophils % 93.7 (*)     Lymphocytes % 2.7 (*)     Neutrophils, Abs 8.76 (*)     Lymphocytes, Absolute 0.25 (*)     Eosinophils % 0.3 (*)     All other components within normal limits   URINALYSIS, REFLEX TO URINE CULTURE - Abnormal; Notable for the following components:    Glucose, UA >=1000 (*)     All other components within normal limits   MANUAL DIFFERENTIAL - Abnormal; Notable for the following components:    Segmented Neutrophils, Man % 95 (*)     Lymphocytes, Man % 3 (*)     All other components within normal limits   THROAT SCREEN, RAPID STREP - Normal   NT-PRO NATRIURETIC PEPTIDE - Normal   MAGNESIUM - Normal   TROPONIN I - Normal   SARS-COV2 (COVID) W/ FLU ANTIGEN - Normal    Narrative:     Negative SARS-CoV results should not be used as the sole basis for treatment or patient management decisions; negative results should be considered in the context of a patient's recent exposures, history and the presene of clinical signs and symptoms consistent with COVID-19.  Negative results should be treated as presumptive and confirmed by molecular assay, if necessary for patient management.   DRUG SCREEN, URINE (BEAKER) - Normal   TROPONIN I - Normal   CBC W/ AUTO DIFFERENTIAL    Narrative:     The following orders were created for panel order CBC auto differential.  Procedure                               Abnormality         Status                     ---------                               -----------          ------                     CBC with Differential[[id number]]        Abnormal            Final result               Manual Differential[[id number]]          Abnormal            Final result                 Please view results for these tests on the individual orders.        ECG Results              EKG 12-lead (In process)  Result time 05/31/23 20:19:00      In process by Interface, Lab In [facility] (05/31/23 20:19:00)                   Narrative:    Test Reason : R07.9,    Vent. Rate : 083 BPM     Atrial Rate : 083 BPM     P-R Int : 168 ms          QRS Dur : 076 ms      QT Int : 372 ms       P-R-T Axes : 039 033 -09 degrees     QTc Int : 437 ms    Normal sinus rhythm  Possible Inferior infarct ,age undetermined  Abnormal ECG  No previous ECGs available    Referred By: [email or web address]   SELF           Confirmed By:                                   Imaging Results              CT Head Without Contrast (In process)                      X-Ray Chest 1 View (Final result)  Result time 05/31/23 20:43:45      Final result by Primitivo Luo DO (05/31/23 20:43:45)                   Impression:      No acute cardiopulmonary process demonstrated.    Point of Service: [facility]      Electronically signed by: Primitivo Luo  Date:    05/31/2023  Time:    20:43               Narrative:    EXAMINATION:  XR CHEST 1 VIEW    CLINICAL HISTORY:  chest pressure;    COMPARISON:  None    TECHNIQUE:  Frontal view/views of the chest.    FINDINGS:  Heart size appears within normal limits.  No focal consolidation, pleural effusion, or pneumothorax.  Visualized osseous and surrounding soft tissue structures demonstrate no acute abnormality.                                       Medications   ondansetron injection 4 mg (4 mg Intravenous Given 5/31/23 2113)   0.9%  NaCl infusion (0 mLs Intravenous Stopped 5/31/23 2200)   sodium chloride 0.9% bolus 1,000 mL 1,000 mL (0 mLs Intravenous Stopped 6/1/23 0139)   promethazine (PHENERGAN) 25 mg in  dextrose 5 % (D5W) 50 mL IVPB (0 mg Intravenous Stopped 6/1/23 0059)   acetaminophen tablet 1,000 mg (1,000 mg Oral Given 6/1/23 0218)     Medical Decision Making:   ED Management:  -2340:  Discussed with Dr. Perez elevated TSH level and she states that number is ok to discharge home if his repoeat troponin is WNL.    -Pt has not vomited since the Zofran IV and has tolerated PO challenge.  -Repeat troponin WNL.  -Pt ambulated to restroom without difficulty to provide urine sample.  He states that when he was moving around his nausea has returned.  His BP on arrival back to [facility] was 91/52 with a repeat of 92/48 and HR remained 91 bpm.  Will give another 1 L NS bolus and phenergan 25 mg IV while awaiting head CT results.  -CT head without results reveal paranasal sinuses with right ethmoid sinus.  No acute large vessel distribution infarction, intracranial bleed, or focal mass seen.  Mild chronic periventricular and subcortical hypodensities seen which are consistent with small vessel ischemic disease or the effects of vasculopathy.    -Pt states he is not as nauseated at the completion of his 2nd liter of IVF and phenergan IV.  He is sleepy.  BP has improved to sitting high fowlers 114/72.  He remains febrile.  Will give Tylenol 1 GM po at discharge.  -Will discharge home with prescription for Levaquin for ethmoid sinusitis and Zofran for nausea.  -Wife is to call his PCP later today and schedule a follow up ED appointment.  Informed pt and wife that if symptoms worsen to seek medical attention immediately.  She verbalized understanding.                       Clinical Impression:   Final diagnoses:  [R07.9] Chest pain  [R11.2] Nausea and vomiting, unspecified vomiting type (Primary)  [R79.89] Elevated TSH  [R42] Dizziness  [R42] Lightheadedness  [J32.2] Ethmoid sinusitis, unspecified chronicity        ED Disposition Condition    Discharge Stable          ED Prescriptions       Medication Sig Dispense Start  Date End Date Auth. Provider    levoFLOXacin (LEVAQUIN) 500 MG tablet Take 1 tablet (500 mg total) by mouth once daily. for 10 days 10 tablet 6/1/2023 6/11/2023 MARTHA Merchant    ondansetron (ZOFRAN) 4 MG tablet Take 1 tablet (4 mg total) by mouth every 6 (six) hours. 12 tablet 6/1/2023 -- MARTHA Merchant          Follow-up Information       Follow up With Specialties Details Why Contact Info    Jessika De Leon NP Family Medicine In 2 days For follow up ED visit regarding elevated TSH and chest pain [address] [address] [place] [address] [phone]      Seek medical attention here or another facility of choice   If symptoms worsen or fail to improve              MARTHA Merchant  06/01/23 0258

## 2023-06-01 NOTE — DISCHARGE INSTRUCTIONS
-Medications as directed  -Increase fluid intake especially water  -Monitor blood sugar closely  -Clear liquid diet for 24 hours then advance slowly while nauseated  -Change positions slowly: do not sit or stand quickly  -Follow up with your regular provider in the next 2 days.  Call later today when clinic opens and schedule a visit.  -Seek medical attention immediately for worsening of symptoms or condition.

## 2023-06-05 ENCOUNTER — PATIENT OUTREACH (OUTPATIENT)
Dept: ADMINISTRATIVE | Facility: HOSPITAL | Age: 58
End: 2023-06-05

## 2023-06-05 NOTE — LETTER
This communication is flagged as high priority.        AUTHORIZATION FOR RELEASE OF   CONFIDENTIAL INFORMATION    Dear [facility],    We are seeing James Mcclellan, date of birth 1965, in the clinic at [facility]. Jessika De Leon NP is the patient's PCP. James Mcclellan has an outstanding lab/procedure at the time we reviewed his chart. In order to help keep his health information updated, he has authorized us to request the following medical record(s):        (  )  MAMMOGRAM                                      (  )  COLONOSCOPY      (  )  PAP SMEAR                                          (  )  OUTSIDE LAB RESULTS     (  )  DEXA SCAN                                          (  )  EYE EXAM            (  )  FOOT EXAM                                          (  )  ENTIRE RECORD     (  )  OUTSIDE IMMUNIZATIONS                 ( X )  6/3/2023 Discharge Summary         Please fax records to [facility] Coordinator, Tosha Pizarro, [phone].     If you have any questions, please contact [phone].        Patient Name: James Mcclellan  : 1965  Patient Phone #: [phone]

## 2023-06-05 NOTE — PROGRESS NOTES
Requesting discharge summary from [facility]. Pt came in today, and scheduled a follow up visit w Dr. Gastelum.

## 2023-06-05 NOTE — LETTER
AUTHORIZATION FOR RELEASE OF   CONFIDENTIAL INFORMATION    Dear [facility],    We are seeing aJmes Mcclellan, date of birth 1965, in the clinic at [facility]. Jessika De Leon NP is the patient's PCP. James Mcclellan has an outstanding lab/procedure at the time we reviewed his chart. In order to help keep his health information updated, he has authorized us to request the following medical record(s):        (  )  MAMMOGRAM                                      (  )  COLONOSCOPY      (  )  PAP SMEAR                                          (  )  OUTSIDE LAB RESULTS     (  )  DEXA SCAN                                          (  )  EYE EXAM            (  )  FOOT EXAM                                          (  )  ENTIRE RECORD     (  )  OUTSIDE IMMUNIZATIONS                 ( X ) 6/3/23 Discharge Summary          Please fax records to [facility] Coordinator, Tosha Pizarro, [phone].     If you have any questions, please contact [phone].          Patient Name: James Mcclellan  : 1965  Patient Phone #: [phone]

## 2023-06-06 ENCOUNTER — OFFICE VISIT (OUTPATIENT)
Dept: OTOLARYNGOLOGY | Facility: CLINIC | Age: 58
End: 2023-06-06
Payer: OTHER GOVERNMENT

## 2023-06-06 VITALS — HEIGHT: 69 IN | BODY MASS INDEX: 31.1 KG/M2 | WEIGHT: 210 LBS

## 2023-06-06 DIAGNOSIS — J34.89 NASAL CAVITY MASS: ICD-10-CM

## 2023-06-06 DIAGNOSIS — G47.33 OSA (OBSTRUCTIVE SLEEP APNEA): Primary | ICD-10-CM

## 2023-06-06 PROCEDURE — 99214 OFFICE O/P EST MOD 30 MIN: CPT | Mod: PBBFAC | Performed by: OTOLARYNGOLOGY

## 2023-06-06 PROCEDURE — 99204 PR OFFICE/OUTPT VISIT, NEW, LEVL IV, 45-59 MIN: ICD-10-PCS | Mod: S$PBB,,, | Performed by: OTOLARYNGOLOGY

## 2023-06-06 PROCEDURE — 99204 OFFICE O/P NEW MOD 45 MIN: CPT | Mod: S$PBB,,, | Performed by: OTOLARYNGOLOGY

## 2023-06-06 NOTE — PROGRESS NOTES
Subjective:       Patient ID: James Mcclellan is a 57 y.o. male.    Chief Complaint: Mass (Right sided nasal cavity mass from CT Head result on 6/1/23. Pt states he can't feel mass and not sore or difficulty to breathe. )  Had polyps before 30 years ago   Mass  Associated symptoms include congestion.   Review of Systems   HENT:  Positive for congestion and sinus pressure.    Respiratory:  Positive for apnea.    All other systems reviewed and are negative.    Objective:      Physical Exam  General: NAD  Head: Normocephalic, atraumatic, no facial asymmetry/normal strength,  Ears: Both auricules normal in appearance, w/o deformities tympanic membranes normal external auditory canals normal  Nose: External nose w/o deformities normal turbinates no drainage or inflammation polyp not seen well  Oral Cavity: Lips, gums, floor of mouth, tongue hard palate, and buccal mucosa without mass/lesion  Oropharynx: Mucosa pink and moist, soft palate, posterior pharynx and oropharyngeal wall without mass/lesion  Neck: Supple, symmetric, trachea midline, no palpable mass/lesion, no palpable cervical lymphadenopathy  Skin: Warm and dry, no concerning lesions  Respiratory: Respirations even, unlabored   Assessment:       1. EDGAR (obstructive sleep apnea)    2. Nasal cavity mass        Plan:       CT reviewed with pt and recommend surgery for removal FESS in OR

## 2023-06-08 ENCOUNTER — OFFICE VISIT (OUTPATIENT)
Dept: FAMILY MEDICINE | Facility: CLINIC | Age: 58
End: 2023-06-08
Payer: OTHER GOVERNMENT

## 2023-06-08 VITALS
WEIGHT: 226 LBS | TEMPERATURE: 98 F | OXYGEN SATURATION: 97 % | HEIGHT: 69 IN | BODY MASS INDEX: 33.47 KG/M2 | SYSTOLIC BLOOD PRESSURE: 120 MMHG | HEART RATE: 73 BPM | RESPIRATION RATE: 18 BRPM | DIASTOLIC BLOOD PRESSURE: 74 MMHG

## 2023-06-08 DIAGNOSIS — J34.89 NASAL CAVITY MASS: ICD-10-CM

## 2023-06-08 DIAGNOSIS — E11.42 DIABETIC POLYNEUROPATHY ASSOCIATED WITH TYPE 2 DIABETES MELLITUS: ICD-10-CM

## 2023-06-08 DIAGNOSIS — Z76.89 ENCOUNTER FOR SUPPORT AND COORDINATION OF TRANSITION OF CARE: Primary | ICD-10-CM

## 2023-06-08 DIAGNOSIS — M79.641 RIGHT HAND PAIN: ICD-10-CM

## 2023-06-08 PROCEDURE — 99214 PR OFFICE/OUTPT VISIT, EST, LEVL IV, 30-39 MIN: ICD-10-PCS | Mod: ,,, | Performed by: STUDENT IN AN ORGANIZED HEALTH CARE EDUCATION/TRAINING PROGRAM

## 2023-06-08 PROCEDURE — 99214 OFFICE O/P EST MOD 30 MIN: CPT | Mod: ,,, | Performed by: STUDENT IN AN ORGANIZED HEALTH CARE EDUCATION/TRAINING PROGRAM

## 2023-06-08 NOTE — PROGRESS NOTES
Progress Note      Follow-up (F/u discharge appt from [facility]/Pt stated he is feeling a lot better now no issues)      SUBJECTIVE:     James Mcclellan is a 57 y.o.male who presents to clinic for Follow-up (F/u discharge appt from [facility]/Pt stated he is feeling a lot better now no issues)    Transitional Care Note    Family and/or Caretaker present at visit?  No.  Diagnostic tests reviewed/disposition: No diagnosic tests pending after this hospitalization.  Disease/illness education: Discussed  Home health/community services discussion/referrals: Patient does not have home health established from hospital visit.  They do not need home health.  If needed, we will set up home health for the patient.   Establishment or re-establishment of referral orders for community resources: No other necessary community resources.   Discussion with other health care providers: No discussion with other health care providers necessary.     Discharge medications reviewed and reconciled:  No changes in medication since discharge and Discharge medications reviewed    Patient presented on 6/1 to ED from home following 2 syncopal episodes.  Patient was using the restroom and he developed loss of consciousness.  911 was called.  Of note, patient was diagnosed with sinusitis on the day prior to presentation to the emergency department.  Patient did have chest tightness the day prior to syncopal episode.  He did have a bowel movement prior to his syncopal episode.  Vital signs were stable at time of admission.  Patient was given IV fluids.  Of note, patient was noted to have nasopharyngeal lesion on exam by admitting provider and it was recommend that he receive outpatient evaluation via ENT.Labs at time of evaluation showed a mildly decreased potassium.  D-dimer was normal.  EKG showed sinus rhythm with nonspecific intraventricular conduction delay and nonspecific ST T wave changes chest x-ray was normal.  CT head was negative for  intracranial lesion or hemorrhage.  However, it was noted that he had a right nasopharynx mass.  He was evaluated where this mass on 06/06.  He has been scheduled for surgery. He would like a second opinion and is requesting a ref to Dr. Gama Velasquez, [place].    Cardiology was consulted during his hospitalization.  Patient's orthostatic vital signs were negative.  He had no arrhythmia during his hospitalization.  His echocardiogram showed an EF of 60% with no regional wall abnormality.  Patient will follow up with Cardiology on outpatient basis for 7 day Holter monitor and possible LINQ implantation. Did get a holter monitor. Will see Cardiology on Wednesday.     EEG was performed and was negative.    Patient does take insulin 55 units prior to bedtime, levothyroxine 200 mcg, omeprazole 20 mg, amlodipine 10 mg, lisinopril 20 mg, aspirin 81 mg, gabapentin 600 mg p.o. t.i.d., Jardiance 25 mg, glimepiride 4 mg, magnesium 420 mg.    He reports he is doing well. He denies any acute concerns today. He does report that he intermittently has pain in his right thenar muscles of his hand. He is requesting an injection which he has gotten in the past. He would like referral.     Past Medical History:  has a past medical history of Bell's palsy (10/15/2018), Degenerative cervical disc, GERD (gastroesophageal reflux disease), History of COVID-19 (01/19/2021), Hypertension, Hypothyroidism, Mild nonproliferative diabetic retinopathy of both eyes without macular edema associated with type 2 diabetes mellitus (01/30/2023), Neck pain, Regular astigmatism of both eyes, and Type 2 diabetes mellitus.   Past Surgical History:  has a past surgical history that includes Anterior cruciate ligament repair; Cholecystectomy; and Sinus surgery.  Family History: family history includes Diabetes in his father and sister; Heart disease in his father; Hypertension in his brother, father, mother, and sister.  Social History:  reports that he has  "never smoked. He has been exposed to tobacco smoke. He has never used smokeless tobacco. He reports that he does not drink alcohol and does not use drugs.  Allergies: Review of patient's allergies indicates:  No Known Allergies    OBJECTIVE:     Vital Signs   /74 (BP Location: Right arm, Patient Position: Sitting, BP Method: Medium (Manual))   Pulse 73   Temp 97.9 °F (36.6 °C) (Temporal)   Resp 18   Ht 5' 9" (1.753 m)   Wt 102.5 kg (226 lb)   SpO2 97%   BMI 33.37 kg/m²     Physical Exam  Constitutional:       General: He is not in acute distress.     Appearance: Normal appearance. He is not ill-appearing, toxic-appearing or diaphoretic.   HENT:      Head: Normocephalic and atraumatic.   Cardiovascular:      Rate and Rhythm: Normal rate and regular rhythm.      Pulses: Normal pulses.      Heart sounds: Normal heart sounds. No murmur heard.    No friction rub. No gallop.   Pulmonary:      Effort: Pulmonary effort is normal.      Breath sounds: No wheezing, rhonchi or rales.   Musculoskeletal:         General: Normal range of motion.   Skin:     General: Skin is warm and dry.      Capillary Refill: Capillary refill takes less than 2 seconds.   Neurological:      General: No focal deficit present.      Mental Status: He is alert.   Psychiatric:         Mood and Affect: Mood normal.         Behavior: Behavior normal.       ASSESSMENT/PLAN:     1. Encounter for support and coordination of transition of care  Patient doing well. Patient requesting a second opinion and requests referral to physician in [place]. Patient to FU with Cardiology on Wednesday of next week. Patient to FU in one month for DMII check.     2. Right hand pain  Will refer to Ortho per patient request. Will obtain Xray hand.   -     X-Ray Hand 3 View Right; Future; Expected date: 06/08/2023  -     Ambulatory referral/consult to Orthopedics; Future; Expected date: 06/15/2023    3. Nasal cavity mass  Nasal cavity mass noted on CT head during " hospitalization. Patient evaluated by ENT in [place]. Patient has been scheduled for surgery. However, patient requests referral for second opinion. Will refer to ENT for second opinion.   -     Ambulatory referral/consult to ENT; Future; Expected date: 06/15/2023    4. Diabetes Mellitus II   Patient's BGL well-controlled during hospitalization. Last hemoglobin A1c was elevated. Patient due for hemoglobin A1c after 6/20. Patient to continue jardiance and insulin at this time. Will discuss transitioning to Trulicity in the future. Patient to continue to monitor his BGLs.     Follow up in about 1 month (around 7/8/2023) for DMII.      PETER CHAUDHARY MD  06/08/2023

## 2023-06-08 NOTE — PROGRESS NOTES
Health Maintenance Due   Topic Date Due    COVID-19 Vaccine (1) Never done    HIV Screening  Never done    Pneumococcal Vaccines (Age 0-64) (2 - PCV) 11/03/2015    Shingles Vaccine (1 of 2) Never done    TETANUS VACCINE  08/26/2018      Discussed results w/pt  Pt stated he is not interested in being schedule for vaccines at this time

## 2023-06-19 ENCOUNTER — PATIENT OUTREACH (OUTPATIENT)
Dept: ADMINISTRATIVE | Facility: HOSPITAL | Age: 58
End: 2023-06-19

## 2023-06-19 NOTE — PROGRESS NOTES
06/19/2023   --Chart accessed for: Care Gaps  --Care Gaps addressed: Immunizations  Outreach made to patient via N/A . (Success) (Left Message)  Media reports reviewed.  [facility] and [facility] reviewed.  Immunization Database ([employer]/MIXX) reviewed. Vaccinations uploaded: COVID -19  Uploaded COVID-19 vaccines into immunizations    Health Maintenance Due   Topic Date Due    HIV Screening  Never done    Colorectal Cancer Screening  Never done    Pneumococcal Vaccines (Age 0-64) (2 - PCV) 11/03/2015    Shingles Vaccine (1 of 2) Never done    TETANUS VACCINE  08/26/2018    COVID-19 Vaccine (3 - Mixed Product series) 01/03/2022    Hemoglobin A1c  06/20/2023

## 2023-07-17 ENCOUNTER — OFFICE VISIT (OUTPATIENT)
Dept: FAMILY MEDICINE | Facility: CLINIC | Age: 58
End: 2023-07-17
Payer: OTHER GOVERNMENT

## 2023-07-17 VITALS
SYSTOLIC BLOOD PRESSURE: 84 MMHG | DIASTOLIC BLOOD PRESSURE: 60 MMHG | TEMPERATURE: 98 F | HEART RATE: 77 BPM | WEIGHT: 219.81 LBS | OXYGEN SATURATION: 94 % | HEIGHT: 69 IN | BODY MASS INDEX: 32.56 KG/M2 | RESPIRATION RATE: 18 BRPM

## 2023-07-17 DIAGNOSIS — E03.9 HYPOTHYROIDISM, UNSPECIFIED TYPE: ICD-10-CM

## 2023-07-17 DIAGNOSIS — E11.42 DIABETIC POLYNEUROPATHY ASSOCIATED WITH TYPE 2 DIABETES MELLITUS: Primary | ICD-10-CM

## 2023-07-17 DIAGNOSIS — I10 ESSENTIAL HYPERTENSION, BENIGN: ICD-10-CM

## 2023-07-17 DIAGNOSIS — E78.5 HYPERLIPIDEMIA ASSOCIATED WITH TYPE 2 DIABETES MELLITUS: ICD-10-CM

## 2023-07-17 DIAGNOSIS — E11.69 HYPERLIPIDEMIA ASSOCIATED WITH TYPE 2 DIABETES MELLITUS: ICD-10-CM

## 2023-07-17 DIAGNOSIS — Z79.4 TYPE 2 DIABETES MELLITUS WITH DIABETIC POLYNEUROPATHY, WITH LONG-TERM CURRENT USE OF INSULIN: ICD-10-CM

## 2023-07-17 DIAGNOSIS — E55.9 VITAMIN D DEFICIENCY: ICD-10-CM

## 2023-07-17 DIAGNOSIS — E11.42 TYPE 2 DIABETES MELLITUS WITH DIABETIC POLYNEUROPATHY, WITH LONG-TERM CURRENT USE OF INSULIN: ICD-10-CM

## 2023-07-17 DIAGNOSIS — K21.9 GASTROESOPHAGEAL REFLUX DISEASE, UNSPECIFIED WHETHER ESOPHAGITIS PRESENT: ICD-10-CM

## 2023-07-17 PROCEDURE — 36415 COLL VENOUS BLD VENIPUNCTURE: CPT | Performed by: NURSE PRACTITIONER

## 2023-07-17 PROCEDURE — 82607 VITAMIN B-12: CPT | Mod: ,,, | Performed by: CLINICAL MEDICAL LABORATORY

## 2023-07-17 PROCEDURE — 80053 COMPREHEN METABOLIC PANEL: CPT | Mod: ,,, | Performed by: CLINICAL MEDICAL LABORATORY

## 2023-07-17 PROCEDURE — 99214 OFFICE O/P EST MOD 30 MIN: CPT | Mod: ,,, | Performed by: NURSE PRACTITIONER

## 2023-07-17 PROCEDURE — 83036 HEMOGLOBIN GLYCOSYLATED A1C: CPT | Mod: ,,, | Performed by: CLINICAL MEDICAL LABORATORY

## 2023-07-17 PROCEDURE — 82746 VITAMIN B12/FOLATE, SERUM PANEL: ICD-10-PCS | Mod: ,,, | Performed by: CLINICAL MEDICAL LABORATORY

## 2023-07-17 PROCEDURE — 82746 ASSAY OF FOLIC ACID SERUM: CPT | Mod: ,,, | Performed by: CLINICAL MEDICAL LABORATORY

## 2023-07-17 PROCEDURE — 80053 COMPREHENSIVE METABOLIC PANEL: ICD-10-PCS | Mod: ,,, | Performed by: CLINICAL MEDICAL LABORATORY

## 2023-07-17 PROCEDURE — 83036 HEMOGLOBIN A1C: ICD-10-PCS | Mod: ,,, | Performed by: CLINICAL MEDICAL LABORATORY

## 2023-07-17 PROCEDURE — 85025 CBC WITH DIFFERENTIAL: ICD-10-PCS | Mod: ,,, | Performed by: CLINICAL MEDICAL LABORATORY

## 2023-07-17 PROCEDURE — 99214 PR OFFICE/OUTPT VISIT, EST, LEVL IV, 30-39 MIN: ICD-10-PCS | Mod: ,,, | Performed by: NURSE PRACTITIONER

## 2023-07-17 PROCEDURE — 85025 COMPLETE CBC W/AUTO DIFF WBC: CPT | Mod: ,,, | Performed by: CLINICAL MEDICAL LABORATORY

## 2023-07-17 PROCEDURE — 82607 VITAMIN B12/FOLATE, SERUM PANEL: ICD-10-PCS | Mod: ,,, | Performed by: CLINICAL MEDICAL LABORATORY

## 2023-07-17 RX ORDER — NAPROXEN SODIUM 220 MG/1
81 TABLET, FILM COATED ORAL DAILY
Qty: 90 TABLET | Refills: 1 | Status: SHIPPED | OUTPATIENT
Start: 2023-07-17 | End: 2024-01-09 | Stop reason: SDUPTHER

## 2023-07-17 RX ORDER — LEVOTHYROXINE SODIUM 50 UG/1
50 TABLET ORAL
Qty: 90 TABLET | Refills: 0 | Status: SHIPPED | OUTPATIENT
Start: 2023-07-17 | End: 2024-01-30

## 2023-07-17 RX ORDER — OMEGA-3-ACID ETHYL ESTERS 1 G/1
2 CAPSULE, LIQUID FILLED ORAL 2 TIMES DAILY
Qty: 360 CAPSULE | Refills: 1 | Status: SHIPPED | OUTPATIENT
Start: 2023-07-17 | End: 2024-01-09 | Stop reason: SDUPTHER

## 2023-07-17 RX ORDER — VIT C/E/ZN/COPPR/LUTEIN/ZEAXAN 250MG-90MG
1000 CAPSULE ORAL DAILY
Qty: 90 CAPSULE | Refills: 1 | Status: SHIPPED | OUTPATIENT
Start: 2023-07-17 | End: 2024-01-09 | Stop reason: SDUPTHER

## 2023-07-17 RX ORDER — OMEPRAZOLE 20 MG/1
20 CAPSULE, DELAYED RELEASE ORAL DAILY
Qty: 90 CAPSULE | Refills: 1 | Status: SHIPPED | OUTPATIENT
Start: 2023-07-17 | End: 2024-01-09 | Stop reason: SDUPTHER

## 2023-07-17 RX ORDER — AMLODIPINE BESYLATE 10 MG/1
10 TABLET ORAL DAILY
Qty: 90 TABLET | Refills: 1 | Status: SHIPPED | OUTPATIENT
Start: 2023-07-17 | End: 2023-07-17

## 2023-07-17 RX ORDER — ATORVASTATIN CALCIUM 80 MG/1
80 TABLET, FILM COATED ORAL DAILY
Qty: 90 TABLET | Refills: 1 | Status: SHIPPED | OUTPATIENT
Start: 2023-07-17 | End: 2024-01-30 | Stop reason: SDUPTHER

## 2023-07-17 RX ORDER — GLIMEPIRIDE 4 MG/1
TABLET ORAL
Qty: 180 TABLET | Refills: 0 | Status: SHIPPED | OUTPATIENT
Start: 2023-07-17 | End: 2024-01-09 | Stop reason: SDUPTHER

## 2023-07-17 RX ORDER — INSULIN ASPART 100 [IU]/ML
INJECTION, SOLUTION INTRAVENOUS; SUBCUTANEOUS
COMMUNITY
Start: 2023-01-24

## 2023-07-17 RX ORDER — AMLODIPINE BESYLATE 5 MG/1
5 TABLET ORAL DAILY
Qty: 90 TABLET | Refills: 1 | Status: SHIPPED | OUTPATIENT
Start: 2023-07-17 | End: 2024-01-30 | Stop reason: SDUPTHER

## 2023-07-17 RX ORDER — LEVOTHYROXINE SODIUM 200 UG/1
200 TABLET ORAL
Qty: 90 TABLET | Refills: 1 | Status: SHIPPED | OUTPATIENT
Start: 2023-07-17 | End: 2024-01-30 | Stop reason: SDUPTHER

## 2023-07-17 NOTE — PROGRESS NOTES
Jessika De Leon NP   [facility]  [address]  [place]  [address]      PATIENT NAME: James Mcclellan  : 1965  DATE: 23  MRN: [id number]      Billing Provider: Jessika De Leon NP  Level of Service: MO OFFICE/OUTPT VISIT, EST, LEVL IV, 30-39 MIN  Patient PCP Information       Provider PCP Type    Jessika De Leon NP General            Reason for Visit / Chief Complaint: Diabetic Foot Exam, Hand Pain (Hands are getting numb and he has a stabbing pain to right hand /Needs a referral for steroid shots into the hand ), medication refills , and Fall (Pt has had two episodes where he passed out back end of may and he is having a chip put in his chest next week in the cath lab )         History of Present Illness / Problem Focused Workflow     James Mcclellan presents to the clinic with Diabetic Foot Exam, Hand Pain (Hands are getting numb and he has a stabbing pain to right hand /Needs a referral for steroid shots into the hand ), medication refills , and Fall (Pt has had two episodes where he passed out back end of may and he is having a chip put in his chest next week in the cath lab )     57 year old male presents to clinic for check  up med refills. He has complaints of numbness and pain to right hand. He is requesting referral to ortho to have steroid injections in this hand. States he has had injections in the past and it helped. He reports he has had 2 episodes over the last few months where he passed out. He is set to go to cath lab next week to have internal monitor placed. He is hypotensive today in clinic and wife states they have been checking it at home and it has been running low.           Review of Systems     @Review of Systems   Constitutional:  Positive for fatigue. Negative for activity change, appetite change and fever.   HENT:  Negative for nasal congestion, ear pain, rhinorrhea, sinus pressure/congestion and sore throat.    Eyes:  Negative for pain, redness, visual  disturbance and eye dryness.   Respiratory:  Negative for cough and shortness of breath.    Cardiovascular:  Negative for chest pain and leg swelling.   Gastrointestinal:  Negative for abdominal distention, abdominal pain, constipation and diarrhea.   Endocrine: Negative for cold intolerance, heat intolerance and polyuria.   Genitourinary:  Negative for bladder incontinence, dysuria, frequency and urgency.   Musculoskeletal:  Positive for arthralgias. Negative for gait problem and myalgias.   Integumentary:  Negative for color change, rash and wound.   Allergic/Immunologic: Negative for environmental allergies and food allergies.   Neurological:  Positive for dizziness and light-headedness. Negative for weakness and headaches.   Psychiatric/Behavioral:  Negative for behavioral problems and sleep disturbance.        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 He has been exposed to tobacco smoke. He has never used smokeless tobacco. He reports that he does not drink alcohol and does not use drugs.    Family History  's family history includes Diabetes in his father and sister; Heart disease in his father; Hypertension in his brother, father, mother, and sister.    Medications and Allergies     Medications  Outpatient Medications Marked as Taking for the 7/17/23  encounter (Office Visit) with Jessika De Leon NP   Medication Sig Dispense Refill    alcohol swabs (ALCOHOL PREP PADS) PadM Apply 1 each topically 3 (three) times daily. 300 each 1    blood sugar diagnostic Strp 100 strips by Misc.(Non-Drug; Combo Route) route once daily. Check blood sugar daily E11.9 100 each 11    gabapentin (NEURONTIN) 300 MG capsule Take 2 capsules (600 mg total) by mouth 3 (three) times daily. 270 capsule 1    lancets 33 gauge Misc Use as directed to check blood glucose 100 each 11    lisinopriL (PRINIVIL,ZESTRIL) 40 MG tablet Take 1 tablet (40 mg total) by mouth once daily. (Patient taking differently: Take 20 mg by mouth once daily.) 90 tablet 1    magnesium oxide 420 mg Tab Take 1 tablet by mouth once daily. 90 each 1    [DISCONTINUED] amLODIPine (NORVASC) 10 MG tablet Take 1 tablet (10 mg total) by mouth once daily. 90 tablet 1    [DISCONTINUED] aspirin 81 MG Chew Take 1 tablet (81 mg total) by mouth once daily. 90 tablet 1    [DISCONTINUED] atorvastatin (LIPITOR) 80 MG tablet Take 1 tablet (80 mg total) by mouth once daily. 90 tablet 1    [DISCONTINUED] cholecalciferol, vitamin D3, (VITAMIN D3) 25 mcg (1,000 unit) capsule Take 1 capsule (1,000 Units total) by mouth once daily. 90 capsule 1    [DISCONTINUED] empagliflozin (JARDIANCE) 25 mg tablet Take 1 tablet (25 mg total) by mouth once daily. 90 tablet 0    [DISCONTINUED] glimepiride (AMARYL) 4 MG tablet TAKE 2 TABLETS DAILY WITH BREAKFAST 180 tablet 0    [DISCONTINUED] insulin (LANTUS SOLOSTAR U-100 INSULIN) glargine 100 units/mL SubQ pen Inject 55 Units into the skin every evening. 3 each 2    [DISCONTINUED] levothyroxine (SYNTHROID) 200 MCG tablet Take 1 tablet (200 mcg total) by mouth before breakfast. 90 tablet 1    [DISCONTINUED] levothyroxine (SYNTHROID) 50 MCG tablet Take 1 tablet (50 mcg total) by mouth before breakfast. 90 tablet 0    [DISCONTINUED] omega-3 acid ethyl esters (LOVAZA) 1 gram capsule Take 2 capsules (2 g total)  by mouth 2 (two) times daily. 360 capsule 1    [DISCONTINUED] omeprazole (PRILOSEC) 20 MG capsule Take 1 capsule (20 mg total) by mouth once daily. 90 capsule 1       Allergies  Review of patient's allergies indicates:  No Known Allergies    Physical Examination     Vitals:    07/17/23 1558   BP: (!) 84/60   Pulse: 77   Resp: 18   Temp: 98 °F (36.7 °C)     Physical Exam  Vitals and nursing note reviewed.   Constitutional:       Appearance: He is obese.   HENT:      Head: Normocephalic.      Right Ear: Tympanic membrane normal.      Left Ear: Tympanic membrane normal.      Nose: Nose normal.      Mouth/Throat:      Mouth: Mucous membranes are moist.      Pharynx: Oropharynx is clear. No posterior oropharyngeal erythema.   Eyes:      Conjunctiva/sclera: Conjunctivae normal.   Cardiovascular:      Rate and Rhythm: Normal rate and regular rhythm.      Pulses: Normal pulses.           Dorsalis pedis pulses are 2+ on the right side and 2+ on the left side.        Posterior tibial pulses are 2+ on the right side and 2+ on the left side.      Heart sounds: Normal heart sounds.   Pulmonary:      Effort: Pulmonary effort is normal.      Breath sounds: Normal breath sounds.   Abdominal:      General: Abdomen is flat. Bowel sounds are normal. There is no distension.      Palpations: Abdomen is soft.   Musculoskeletal:         General: No swelling or tenderness. Normal range of motion.      Cervical back: Normal range of motion.      Right lower leg: No edema.      Left lower leg: No edema.   Feet:      Right foot:      Protective Sensation: 10 sites tested.  2 sites sensed.      Skin integrity: Dry skin present.      Toenail Condition: Right toenails are normal.      Left foot:      Protective Sensation: 10 sites tested.  2 sites sensed.      Skin integrity: Dry skin present.      Toenail Condition: Left toenails are normal.   Skin:     General: Skin is warm and dry.      Capillary Refill: Capillary refill takes less than 2  seconds.   Neurological:      Mental Status: He is alert. Mental status is at baseline.   Psychiatric:         Mood and Affect: Mood normal.         Behavior: Behavior normal.               Lab Results   Component Value Date    WBC 8.59 07/17/2023    HGB 15.1 07/17/2023    HCT 45.7 07/17/2023    MCV 86.1 07/17/2023     07/17/2023        CMP  Sodium   Date Value Ref Range Status   07/17/2023 137 136 - 145 mmol/L Final     Potassium   Date Value Ref Range Status   07/17/2023 4.2 3.5 - 5.1 mmol/L Final     Chloride   Date Value Ref Range Status   07/17/2023 102 98 - 107 mmol/L Final     CO2   Date Value Ref Range Status   07/17/2023 27 21 - 32 mmol/L Final     Glucose   Date Value Ref Range Status   07/17/2023 160 (H) 74 - 106 mg/dL Final     BUN   Date Value Ref Range Status   07/17/2023 18 7 - 18 mg/dL Final     Creatinine   Date Value Ref Range Status   07/17/2023 1.21 0.70 - 1.30 mg/dL Final     Calcium   Date Value Ref Range Status   07/17/2023 9.4 8.5 - 10.1 mg/dL Final     Total Protein   Date Value Ref Range Status   07/17/2023 8.3 (H) 6.4 - 8.2 g/dL Final     Albumin   Date Value Ref Range Status   07/17/2023 3.8 3.5 - 5.0 g/dL Final     Bilirubin, Total   Date Value Ref Range Status   07/17/2023 1.8 (H) >0.0 - 1.2 mg/dL Final     Alk Phos   Date Value Ref Range Status   07/17/2023 81 45 - 115 U/L Final     AST   Date Value Ref Range Status   07/17/2023 40 (H) 15 - 37 U/L Final     ALT   Date Value Ref Range Status   07/17/2023 49 16 - 61 U/L Final     Anion Gap   Date Value Ref Range Status   07/17/2023 12 7 - 16 mmol/L Final     eGFR   Date Value Ref Range Status   07/17/2023 70 >=60 mL/min/1.73m2 Final     Procedures   Assessment and Plan (including Health Maintenance)   :    Plan:           Problem List Items Addressed This Visit          Neuro    Diabetic polyneuropathy associated with type 2 diabetes mellitus - Primary    Current Assessment & Plan     Foot exam performed today with noted loss of  sensation. Encouraged tight control of glucose.   HgbA1C obtained at today's visit. Goal is less than 7. Continue current meds and low carb/no concentrated sweets diet with increased exercise as tolerated. Will follow up with lab results. Patient to follow up in 3 months or as needed.            Relevant Medications    NOVOLOG FLEXPEN U-100 INSULIN 100 unit/mL (3 mL) InPn pen    empagliflozin (JARDIANCE) 25 mg tablet    glimepiride (AMARYL) 4 MG tablet       Cardiac/Vascular    Essential hypertension, benign    Current Assessment & Plan     He continues to be hypotensive and has had some syncopal episodes. He was instructed to decrease Norvasc to 5mg daily. Continue to monitor BP and keep log. Follow up in 1 month. He is scheduled to go to cath lab for placement of internal monitor.         Relevant Medications    aspirin 81 MG Chew    Other Relevant Orders    Vitamin B12 & Folate (Completed)    Comprehensive Metabolic Panel (Completed)    CBC Auto Differential (Completed)    CBC Morphology (Completed)    Hyperlipidemia associated with type 2 diabetes mellitus    Relevant Medications    NOVOLOG FLEXPEN U-100 INSULIN 100 unit/mL (3 mL) InPn pen    atorvastatin (LIPITOR) 80 MG tablet    glimepiride (AMARYL) 4 MG tablet    omega-3 acid ethyl esters (LOVAZA) 1 gram capsule       Endocrine    Type 2 diabetes mellitus    Current Assessment & Plan     HgbA1C obtained at today's visit. Goal is less than 7. Continue current meds and low carb/no concentrated sweets diet with increased exercise as tolerated. Will follow up with lab results. Patient to follow up in 3 months or as needed.            Relevant Medications    NOVOLOG FLEXPEN U-100 INSULIN 100 unit/mL (3 mL) InPn pen    empagliflozin (JARDIANCE) 25 mg tablet    glimepiride (AMARYL) 4 MG tablet    Other Relevant Orders    Hemoglobin A1C (Completed)     Other Visit Diagnoses       Vitamin D deficiency        Relevant Medications    cholecalciferol, vitamin D3,  (VITAMIN D3) 25 mcg (1,000 unit) capsule    Hypothyroidism, unspecified type        TSH ordered; continue current medication. take medication on empty stomach    Relevant Medications    levothyroxine (SYNTHROID) 200 MCG tablet    levothyroxine (SYNTHROID) 50 MCG tablet    Hypothyroidism, unspecified type        Relevant Medications    levothyroxine (SYNTHROID) 200 MCG tablet    levothyroxine (SYNTHROID) 50 MCG tablet    Gastroesophageal reflux disease, unspecified whether esophagitis present        Relevant Medications    omeprazole (PRILOSEC) 20 MG capsule            Health Maintenance Topics with due status: Not Due       Topic Last Completion Date    Eye Exam 01/30/2023    Lipid Panel 03/20/2023    Low Dose Statin 07/17/2023    Hemoglobin A1c 07/17/2023       Future Appointments   Date Time Provider Department Center   10/9/2023 10:00 AM Jessika De Leon NP [facility]        Health Maintenance Due   Topic Date Due    HIV Screening  Never done    Colorectal Cancer Screening  Never done    Pneumococcal Vaccines (Age 0-64) (2 - PCV) 11/03/2015    Shingles Vaccine (1 of 2) Never done    TETANUS VACCINE  08/26/2018    COVID-19 Vaccine (3 - Mixed Product series) 01/03/2022    Diabetes Urine Screening  09/12/2023    Foot Exam  09/12/2023        No follow-ups on file.     Signature:  Jessika De Leon NP  [address] [place]  [address]    Date of encounter: 7/17/23

## 2023-07-18 LAB
ALBUMIN SERPL BCP-MCNC: 3.8 G/DL (ref 3.5–5)
ALBUMIN/GLOB SERPL: 0.8 {RATIO}
ALP SERPL-CCNC: 81 U/L (ref 45–115)
ALT SERPL W P-5'-P-CCNC: 49 U/L (ref 16–61)
ANION GAP SERPL CALCULATED.3IONS-SCNC: 12 MMOL/L (ref 7–16)
AST SERPL W P-5'-P-CCNC: 40 U/L (ref 15–37)
BASOPHILS # BLD AUTO: 0.04 K/UL (ref 0–0.2)
BASOPHILS NFR BLD AUTO: 0.5 % (ref 0–1)
BILIRUB SERPL-MCNC: 1.8 MG/DL (ref ?–1.2)
BUN SERPL-MCNC: 18 MG/DL (ref 7–18)
BUN/CREAT SERPL: 15 (ref 6–20)
CALCIUM SERPL-MCNC: 9.4 MG/DL (ref 8.5–10.1)
CHLORIDE SERPL-SCNC: 102 MMOL/L (ref 98–107)
CO2 SERPL-SCNC: 27 MMOL/L (ref 21–32)
CREAT SERPL-MCNC: 1.21 MG/DL (ref 0.7–1.3)
DIFFERENTIAL METHOD BLD: ABNORMAL
EGFR (NO RACE VARIABLE) (RUSH/TITUS): 70 ML/MIN/1.73M2
EOSINOPHIL # BLD AUTO: 0.07 K/UL (ref 0–0.5)
EOSINOPHIL NFR BLD AUTO: 0.8 % (ref 1–4)
ERYTHROCYTE [DISTWIDTH] IN BLOOD BY AUTOMATED COUNT: 13.1 % (ref 11.5–14.5)
EST. AVERAGE GLUCOSE BLD GHB EST-MCNC: 180 MG/DL
FOLATE SERPL-MCNC: 11.1 NG/ML (ref 3.1–17.5)
GLOBULIN SER-MCNC: 4.5 G/DL (ref 2–4)
GLUCOSE SERPL-MCNC: 160 MG/DL (ref 74–106)
HBA1C MFR BLD HPLC: 8 % (ref 4.5–6.6)
HCT VFR BLD AUTO: 45.7 % (ref 40–54)
HGB BLD-MCNC: 15.1 G/DL (ref 13.5–18)
IMM GRANULOCYTES # BLD AUTO: 0.02 K/UL (ref 0–0.04)
IMM GRANULOCYTES NFR BLD: 0.2 % (ref 0–0.4)
LYMPHOCYTES # BLD AUTO: 2.44 K/UL (ref 1–4.8)
LYMPHOCYTES NFR BLD AUTO: 28.4 % (ref 27–41)
MCH RBC QN AUTO: 28.4 PG (ref 27–31)
MCHC RBC AUTO-ENTMCNC: 33 G/DL (ref 32–36)
MCV RBC AUTO: 86.1 FL (ref 80–96)
MONOCYTES # BLD AUTO: 0.79 K/UL (ref 0–0.8)
MONOCYTES NFR BLD AUTO: 9.2 % (ref 2–6)
MPC BLD CALC-MCNC: 13.6 FL (ref 9.4–12.4)
NEUTROPHILS # BLD AUTO: 5.23 K/UL (ref 1.8–7.7)
NEUTROPHILS NFR BLD AUTO: 60.9 % (ref 53–65)
NRBC # BLD AUTO: 0 X10E3/UL
NRBC, AUTO (.00): 0 %
PLATELET # BLD AUTO: 151 K/UL (ref 150–400)
PLATELET MORPHOLOGY: ABNORMAL
POTASSIUM SERPL-SCNC: 4.2 MMOL/L (ref 3.5–5.1)
PROT SERPL-MCNC: 8.3 G/DL (ref 6.4–8.2)
RBC # BLD AUTO: 5.31 M/UL (ref 4.6–6.2)
RBC MORPH BLD: NORMAL
SODIUM SERPL-SCNC: 137 MMOL/L (ref 136–145)
VIT B12 SERPL-MCNC: 362 PG/ML (ref 193–986)
WBC # BLD AUTO: 8.59 K/UL (ref 4.5–11)

## 2023-07-19 DIAGNOSIS — Z79.4 TYPE 2 DIABETES MELLITUS WITH DIABETIC POLYNEUROPATHY, WITH LONG-TERM CURRENT USE OF INSULIN: ICD-10-CM

## 2023-07-19 DIAGNOSIS — E11.42 TYPE 2 DIABETES MELLITUS WITH DIABETIC POLYNEUROPATHY, WITH LONG-TERM CURRENT USE OF INSULIN: ICD-10-CM

## 2023-07-19 RX ORDER — INSULIN GLARGINE 100 [IU]/ML
60 INJECTION, SOLUTION SUBCUTANEOUS NIGHTLY
Qty: 1 EACH | Refills: 0
Start: 2023-07-19 | End: 2024-01-30 | Stop reason: SDUPTHER

## 2023-07-19 NOTE — PROGRESS NOTES
Labs reviewed: Hgb A1C was 8 which is still above goal of 7 but much better than it has been in the past. Continue to work hard on diabetic diet. Increase Lantus to 60 units every evening and keep tight control with sliding scale Novolog. All other labs normal or clinically insignificant. Follow up in 3 months for recheck of A1C.

## 2023-08-18 PROBLEM — E11.69 HYPERLIPIDEMIA ASSOCIATED WITH TYPE 2 DIABETES MELLITUS: Status: ACTIVE | Noted: 2023-08-18

## 2023-08-18 PROBLEM — E78.5 HYPERLIPIDEMIA ASSOCIATED WITH TYPE 2 DIABETES MELLITUS: Status: ACTIVE | Noted: 2023-08-18

## 2023-08-18 NOTE — ASSESSMENT & PLAN NOTE
He continues to be hypotensive and has had some syncopal episodes. He was instructed to decrease Norvasc to 5mg daily. Continue to monitor BP and keep log. Follow up in 1 month. He is scheduled to go to cath lab for placement of internal monitor.

## 2023-08-18 NOTE — ASSESSMENT & PLAN NOTE
Foot exam performed today with noted loss of sensation. Encouraged tight control of glucose.   HgbA1C obtained at today's visit. Goal is less than 7. Continue current meds and low carb/no concentrated sweets diet with increased exercise as tolerated. Will follow up with lab results. Patient to follow up in 3 months or as needed.

## 2023-09-11 ENCOUNTER — OFFICE VISIT (OUTPATIENT)
Dept: FAMILY MEDICINE | Facility: CLINIC | Age: 58
End: 2023-09-11
Payer: OTHER GOVERNMENT

## 2023-09-11 VITALS
HEIGHT: 69 IN | TEMPERATURE: 98 F | BODY MASS INDEX: 32.26 KG/M2 | DIASTOLIC BLOOD PRESSURE: 72 MMHG | HEART RATE: 74 BPM | RESPIRATION RATE: 20 BRPM | SYSTOLIC BLOOD PRESSURE: 106 MMHG | WEIGHT: 217.81 LBS | OXYGEN SATURATION: 96 %

## 2023-09-11 DIAGNOSIS — J01.20 ACUTE NON-RECURRENT ETHMOIDAL SINUSITIS: Primary | ICD-10-CM

## 2023-09-11 DIAGNOSIS — G51.0 BELL'S PALSY: ICD-10-CM

## 2023-09-11 DIAGNOSIS — M79.641 RIGHT HAND PAIN: ICD-10-CM

## 2023-09-11 PROCEDURE — 96372 PR INJECTION,THERAP/PROPH/DIAG2ST, IM OR SUBCUT: ICD-10-PCS | Mod: ,,, | Performed by: NURSE PRACTITIONER

## 2023-09-11 PROCEDURE — 96372 THER/PROPH/DIAG INJ SC/IM: CPT | Mod: ,,, | Performed by: NURSE PRACTITIONER

## 2023-09-11 PROCEDURE — 99213 PR OFFICE/OUTPT VISIT, EST, LEVL III, 20-29 MIN: ICD-10-PCS | Mod: 25,,, | Performed by: NURSE PRACTITIONER

## 2023-09-11 PROCEDURE — 99213 OFFICE O/P EST LOW 20 MIN: CPT | Mod: 25,,, | Performed by: NURSE PRACTITIONER

## 2023-09-11 RX ORDER — CEFTRIAXONE 1 G/1
1 INJECTION, POWDER, FOR SOLUTION INTRAMUSCULAR; INTRAVENOUS
Status: COMPLETED | OUTPATIENT
Start: 2023-09-11 | End: 2023-09-11

## 2023-09-11 RX ORDER — LANCETS
EACH MISCELLANEOUS
COMMUNITY
Start: 2022-09-12 | End: 2023-09-11

## 2023-09-11 RX ORDER — AZITHROMYCIN 250 MG/1
TABLET, FILM COATED ORAL
Qty: 6 TABLET | Refills: 0 | Status: SHIPPED | OUTPATIENT
Start: 2023-09-11 | End: 2023-09-16

## 2023-09-11 RX ORDER — DEXAMETHASONE SODIUM PHOSPHATE 4 MG/ML
4 INJECTION, SOLUTION INTRA-ARTICULAR; INTRALESIONAL; INTRAMUSCULAR; INTRAVENOUS; SOFT TISSUE
Status: COMPLETED | OUTPATIENT
Start: 2023-09-11 | End: 2023-09-11

## 2023-09-11 RX ORDER — LANCETS
EACH MISCELLANEOUS
COMMUNITY
Start: 2022-09-12 | End: 2023-09-12

## 2023-09-11 RX ADMIN — DEXAMETHASONE SODIUM PHOSPHATE 4 MG: 4 INJECTION, SOLUTION INTRA-ARTICULAR; INTRALESIONAL; INTRAMUSCULAR; INTRAVENOUS; SOFT TISSUE at 04:09

## 2023-09-11 RX ADMIN — CEFTRIAXONE 1 G: 1 INJECTION, POWDER, FOR SOLUTION INTRAMUSCULAR; INTRAVENOUS at 04:09

## 2023-09-11 NOTE — PROGRESS NOTES
Jessika De Leon NP   [facility]  [address]  [place]  [address]      PATIENT NAME: James Mcclellan  : 1965  DATE: 23  MRN: [id number]      Billing Provider: Jessika De Leon NP  Level of Service: [place] OFFICE/OUTPT VISIT, EST, LEVL III, 20-29 MIN  Patient PCP Information       Provider PCP Type    Jessika De Leon NP General            Reason for Visit / Chief Complaint: Otalgia and Sinus Problem (Right side of face hurts not sure if it is his ear or just sinus pressure. Started about a week ago )         History of Present Illness / Problem Focused Workflow     James Mcclellan presents to the clinic with Otalgia and Sinus Problem (Right side of face hurts not sure if it is his ear or just sinus pressure. Started about a week ago )     57 year old male presents to clinic with complaints of right ear pain and sinus pressure that started about a week ago. He also reports this AM when he woke up he had some numbness and tingling to right side of face and mouth. He has history of Bell's Palsy.   Also requesting referral to Ortho for eval of right carpal tunnel pain.           Review of Systems     @Review of Systems   Constitutional:  Negative for activity change, appetite change, fatigue and fever.   HENT:  Positive for ear pain and sinus pressure/congestion. Negative for nasal congestion, rhinorrhea and sore throat.    Eyes:  Negative for pain, redness, visual disturbance and eye dryness.   Respiratory:  Negative for cough and shortness of breath.    Cardiovascular:  Negative for chest pain and leg swelling.   Gastrointestinal:  Negative for abdominal distention, abdominal pain, constipation and diarrhea.   Endocrine: Negative for cold intolerance, heat intolerance and polyuria.   Genitourinary:  Negative for bladder incontinence, dysuria, frequency and urgency.   Musculoskeletal:  Negative for arthralgias, gait problem and myalgias.   Integumentary:  Negative for color change, rash and  wound.   Allergic/Immunologic: Negative for environmental allergies and food allergies.   Neurological:  Positive for numbness. Negative for dizziness, weakness, light-headedness and headaches.   Psychiatric/Behavioral:  Negative for behavioral problems and sleep disturbance.        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 He has been exposed to tobacco smoke. He has never used smokeless tobacco. He reports that he does not drink alcohol and does not use drugs.    Family History  's family history includes Diabetes in his father and sister; Heart disease in his father; Hypertension in his brother, father, mother, and sister.    Medications and Allergies     Medications  Outpatient Medications Marked as Taking for the 9/11/23 encounter (Office Visit) with Jessika De Leon NP   Medication Sig Dispense Refill    alcohol swabs (ALCOHOL PREP PADS) PadM Apply 1 each topically 3 (three) times daily. 300 each 1    amLODIPine (NORVASC) 5 MG tablet Take 1 tablet (5 mg total) by mouth once daily. 90 tablet 1    aspirin 81 MG Chew Take 1 tablet (81 mg total) by mouth once daily. 90 tablet 1    atorvastatin (LIPITOR) 80 MG tablet Take 1 tablet (80 mg total) by mouth once daily. 90 tablet 1    blood sugar diagnostic (BLOOD GLUCOSE TEST) Strp USE TO CHECK BLOOD SUGAR  ONCE DAILY      blood sugar diagnostic (BLOOD GLUCOSE TEST) Strp   See dose instructions in comments, # 100 EA, 3 total refill(s), Acute      blood sugar diagnostic Strp 100 strips by Misc.(Non-Drug; Combo Route) route once daily. Check blood sugar daily E11.9 100 each 11    cholecalciferol, vitamin D3, (VITAMIN D3) 25 mcg (1,000 unit) capsule Take 1 capsule (1,000 Units total) by mouth once daily. 90 capsule 1    empagliflozin (JARDIANCE) 25 mg tablet Take 1 tablet (25 mg total) by mouth once daily. 90 tablet 0    gabapentin (NEURONTIN) 300 MG capsule Take 2 capsules (600 mg total) by mouth 3 (three) times daily. 270 capsule 1    glimepiride (AMARYL) 4 MG tablet TAKE 2 TABLETS DAILY WITH BREAKFAST 180 tablet 0    insulin (LANTUS SOLOSTAR U-100 INSULIN) glargine 100 units/mL SubQ pen Inject 60 Units into the skin every evening. 1 each 0    lancets 33 gauge Misc Use as directed to check blood glucose 100 each 11    lancets Misc USE AS DIRECTED TO CHECK BLOOD GLUCOSE      lancets Misc   See dose instructions in comments, # 100 EA, 3 total refill(s), Acute      levothyroxine (SYNTHROID) 200 MCG tablet Take 1 tablet (200 mcg total) by mouth before breakfast. 90 tablet 1    levothyroxine (SYNTHROID) 50 MCG tablet Take 1 tablet (50 mcg total) by mouth before breakfast. 90 tablet 0    lisinopriL (PRINIVIL,ZESTRIL) 40 MG tablet Take 1 tablet (40 mg total) by mouth once daily. (Patient taking differently: Take 20 mg by mouth once daily.) 90 tablet 1    magnesium oxide 420 mg Tab Take 1 tablet by mouth once daily. 90 each 1    NOVOLOG FLEXPEN U-100 INSULIN 100 unit/mL (3 mL) InPn pen Inject into the skin.      omega-3 acid ethyl esters (LOVAZA) 1 gram capsule Take 2 capsules (2 g total) by mouth 2 (two) times daily. 360 capsule 1    omeprazole (PRILOSEC) 20 MG capsule Take 1 capsule (20 mg total) by mouth once daily. 90 capsule 1     Current Facility-Administered Medications for the 9/11/23 encounter (Office Visit) with  Jessika De Leon NP   Medication Dose Route Frequency Provider Last Rate Last Admin    [COMPLETED] cefTRIAXone injection 1 g  1 g Intramuscular 1 time in Clinic/HOD Jessika De Leon NP   1 g at 09/11/23 1620    [COMPLETED] dexAMETHasone injection 4 mg  4 mg Intramuscular 1 time in Clinic/HOD Jessika De Leon NP   4 mg at 09/11/23 1620       Allergies  Review of patient's allergies indicates:  No Known Allergies    Physical Examination     Vitals:    09/11/23 1503   BP: 106/72   Pulse: 74   Resp: 20   Temp: 97.8 °F (36.6 °C)     Physical Exam  Vitals and nursing note reviewed.   Constitutional:       Appearance: Normal appearance.   HENT:      Head: Normocephalic.      Right Ear: Tympanic membrane normal.      Left Ear: Tympanic membrane normal.      Nose: Congestion and rhinorrhea present. Rhinorrhea is purulent.      Right Turbinates: Pale.      Left Turbinates: Pale.      Right Sinus: Maxillary sinus tenderness and frontal sinus tenderness present.      Left Sinus: Maxillary sinus tenderness and frontal sinus tenderness present.      Mouth/Throat:      Lips: Pink.      Mouth: Mucous membranes are moist.      Pharynx: Oropharyngeal exudate (clear post nasal drainage.) and posterior oropharyngeal erythema present.   Eyes:      Conjunctiva/sclera: Conjunctivae normal.   Cardiovascular:      Rate and Rhythm: Normal rate and regular rhythm.      Pulses: Normal pulses.      Heart sounds: Normal heart sounds.   Pulmonary:      Effort: Pulmonary effort is normal.      Breath sounds: Normal breath sounds. No wheezing or rhonchi.   Abdominal:      General: Abdomen is flat. Bowel sounds are normal. There is no distension.      Palpations: Abdomen is soft.      Tenderness: There is no abdominal tenderness.   Musculoskeletal:         General: Normal range of motion.      Right hand: Decreased sensation of the ulnar distribution.      Cervical back: Normal range of motion.   Skin:     General: Skin is warm and dry.       Capillary Refill: Capillary refill takes less than 2 seconds.   Neurological:      General: No focal deficit present.      Mental Status: He is alert and oriented to person, place, and time. Mental status is at baseline.      Cranial Nerves: Facial asymmetry present.      Comments: He has some noted right sided facial weakness and numbness.    Psychiatric:         Mood and Affect: Mood normal.         Behavior: Behavior normal.               Lab Results   Component Value Date    WBC 8.59 07/17/2023    HGB 15.1 07/17/2023    HCT 45.7 07/17/2023    MCV 86.1 07/17/2023     07/17/2023        CMP  Sodium   Date Value Ref Range Status   07/17/2023 137 136 - 145 mmol/L Final     Potassium   Date Value Ref Range Status   07/17/2023 4.2 3.5 - 5.1 mmol/L Final     Chloride   Date Value Ref Range Status   07/17/2023 102 98 - 107 mmol/L Final     CO2   Date Value Ref Range Status   07/17/2023 27 21 - 32 mmol/L Final     Glucose   Date Value Ref Range Status   07/17/2023 160 (H) 74 - 106 mg/dL Final     BUN   Date Value Ref Range Status   07/17/2023 18 7 - 18 mg/dL Final     Creatinine   Date Value Ref Range Status   07/17/2023 1.21 0.70 - 1.30 mg/dL Final     Calcium   Date Value Ref Range Status   07/17/2023 9.4 8.5 - 10.1 mg/dL Final     Total Protein   Date Value Ref Range Status   07/17/2023 8.3 (H) 6.4 - 8.2 g/dL Final     Albumin   Date Value Ref Range Status   07/17/2023 3.8 3.5 - 5.0 g/dL Final     Bilirubin, Total   Date Value Ref Range Status   07/17/2023 1.8 (H) >0.0 - 1.2 mg/dL Final     Alk Phos   Date Value Ref Range Status   07/17/2023 81 45 - 115 U/L Final     AST   Date Value Ref Range Status   07/17/2023 40 (H) 15 - 37 U/L Final     ALT   Date Value Ref Range Status   07/17/2023 49 16 - 61 U/L Final     Anion Gap   Date Value Ref Range Status   07/17/2023 12 7 - 16 mmol/L Final     eGFR   Date Value Ref Range Status   07/17/2023 70 >=60 mL/min/1.73m2 Final     Procedures   Assessment and Plan (including  "Health Maintenance)   :    Plan:           Problem List Items Addressed This Visit          Neuro    Ramey's palsy    Current Assessment & Plan     He has noted weakness and numbness to right side of face.             ENT    Ethmoid sinusitis - Primary    Current Assessment & Plan     Rocephin and Decadron given IM in clinic.   Zithromax as ordered and Ed a hist as needed.    Reviewed pathology of current symptoms, medication side effects/risk/benefits/directions on taking medications, and worsening or persistent symptoms that require follow up in next 2-3 days. Saline/steroid nasal sprays, antihistamine use, increase fluid intake, and multivitamin/elderberry/Zinc use were recommended. May take Tylenol or Motrin as needed for pain and/or fever. Patient was instructed to take antibiotic as directed, complete entire course to avoid antibiotic resistance, and take OTC probiotic with antibiotic to prevent GI upset. Patient verbalized understanding of treatment plan and denies any questions.              Orthopedic    Right hand pain    Current Assessment & Plan     Patient reports pain and numbness at times to right hand. States it feels at time as if it "locks up" and he is unable to use it. He reports a previous provider gave steroid injections in hand which helped some. He is requesting referral to Ortho for eval and possible injections.          Relevant Orders    Ambulatory referral/consult to Orthopedics       Health Maintenance Topics with due status: Not Due       Topic Last Completion Date    Eye Exam 01/30/2023    Lipid Panel 03/20/2023    Foot Exam 07/17/2023    Hemoglobin A1c 07/17/2023    Low Dose Statin 09/11/2023       Future Appointments   Date Time Provider Department Center   10/9/2023 10:00 AM Jessika De Leon NP [facility] [place]        Health Maintenance Due   Topic Date Due    Colorectal Cancer Screening  Never done    TETANUS VACCINE  08/26/2018    Diabetes Urine Screening  09/12/2023    "     No follow-ups on file.     Signature:  Jessika De Leon NP  [address] [place]  [address]    Date of encounter: 9/11/23

## 2023-09-11 NOTE — ASSESSMENT & PLAN NOTE
"Patient reports pain and numbness at times to right hand. States it feels at time as if it "locks up" and he is unable to use it. He reports a previous provider gave steroid injections in hand which helped some. He is requesting referral to Ortho for eval and possible injections.   "

## 2023-10-10 ENCOUNTER — OFFICE VISIT (OUTPATIENT)
Dept: FAMILY MEDICINE | Facility: CLINIC | Age: 58
End: 2023-10-10
Payer: OTHER GOVERNMENT

## 2023-10-10 VITALS
DIASTOLIC BLOOD PRESSURE: 70 MMHG | SYSTOLIC BLOOD PRESSURE: 120 MMHG | OXYGEN SATURATION: 98 % | RESPIRATION RATE: 18 BRPM | BODY MASS INDEX: 31.4 KG/M2 | HEIGHT: 69 IN | TEMPERATURE: 98 F | WEIGHT: 212 LBS | HEART RATE: 67 BPM

## 2023-10-10 DIAGNOSIS — G51.0 BELL'S PALSY: ICD-10-CM

## 2023-10-10 DIAGNOSIS — M79.641 RIGHT HAND PAIN: ICD-10-CM

## 2023-10-10 PROCEDURE — 99213 OFFICE O/P EST LOW 20 MIN: CPT | Mod: ,,, | Performed by: NURSE PRACTITIONER

## 2023-10-10 PROCEDURE — 99213 PR OFFICE/OUTPT VISIT, EST, LEVL III, 20-29 MIN: ICD-10-PCS | Mod: ,,, | Performed by: NURSE PRACTITIONER

## 2023-10-10 NOTE — ASSESSMENT & PLAN NOTE
"Patient reports pain and numbness at times to right hand. States it feels at time as if it "locks up" and he is unable to use it. He reports a previous provider gave steroid injections in hand which helped some. He is requesting referral to Ortho for eval and possible injections.   Apparently referral was made and patient went to Ortho clinic, sat and waited over an hour to be seen and then was informed that they did not have [facility] referral so he could not be seen.   We will again make referral and do it internally this time so we can follow up and make sure it is done.   "

## 2023-10-10 NOTE — ASSESSMENT & PLAN NOTE
Continued facial droop on right side from what he believes is a flare of his Bell's palsy. He does at time have some tingling to this side of face as well. He reports headaches 2-3 times per week that start behind his right ear and radiates up his head. He describes headaches as throbbing pain. Thinks this may be related to Bell's palsy as well. 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 He is seeing ENT at [facility] for this. We will hold off on further imaging and refer to Neurology for evaluation of headaches and facial droop/tingling.

## 2023-10-10 NOTE — PROGRESS NOTES
PT did not wish to discuss the care gaps today. States has already discussed them in previous visit and does not want to do it again.

## 2023-10-10 NOTE — PROGRESS NOTES
Jessika De Leon NP     [address]  [place]  [address]      PATIENT NAME: James Mcclellan  : 1965  DATE: 10/10/23  MRN: [id number]      Billing Provider: Jessika De Leon NP  Level of Service: MO OFFICE/OUTPT VISIT, EST, LEVL III, 20-29 MIN  Patient PCP Information       Provider PCP Type    Jessika De Leon NP General            Reason for Visit / Chief Complaint: Hand Pain (Right had. Still having issues. Went to have injections done and Dr. Reese did not have the [facility] referral. States that that clinic told him that we had the referral. ) and Headache (Has them 2-3 times a week behind right ear. States throbbing pain. Nothing seems to help the pain. Believes its an effect from the Bell's Palsy. States unable to work when he gets them. )         History of Present Illness / Problem Focused Workflow     James Mcclellan presents to the clinic with Hand Pain (Right had. Still having issues. Went to have injections done and Dr. Reese did not have the [facility] referral. States that that clinic told him that we had the referral. ) and Headache (Has them 2-3 times a week behind right ear. States throbbing pain. Nothing seems to help the pain. Believes its an effect from the Bell's Palsy. States unable to work when he gets them. )     58 year old male presents to clinic with complaints of continued facial droop on right side from what he believes is a flare of his Bell's palsy. He does at time have some tingling to this side of face as well. He reports headaches 2-3 times per week that start behind his right ear and radiates up his head. He describes headaches as throbbing pain. Thinks this may be related to Bell's palsy as well. 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 He is seeing ENT at [facility] for this. We will hold off on further imaging and refer to Neurology for evaluation of headaches and facial droop/tingling.   He continues to have  pain, numbness, and tingling to right hand. He was referred to Dr Reese, orthopedics. States he showed up for appt and waiting 1.5 hours and then was told that they did not have [facility] referral and he could not be seen. He is upset in regard to this in clinic and is requesting another referral. We will refer again and this time work referral internally in clinic so that we can be assured it is taken care of.           Review of Systems     @Review of Systems   Constitutional:  Negative for activity change, appetite change, fatigue and fever.   HENT:  Negative for nasal congestion, ear pain, rhinorrhea, sinus pressure/congestion and sore throat.    Eyes:  Negative for pain, redness, visual disturbance and eye dryness.   Respiratory:  Negative for cough and shortness of breath.    Cardiovascular:  Negative for chest pain and leg swelling.   Gastrointestinal:  Negative for abdominal distention, abdominal pain, constipation and diarrhea.   Endocrine: Negative for cold intolerance, heat intolerance and polyuria.   Genitourinary:  Negative for bladder incontinence, dysuria, frequency and urgency.   Musculoskeletal:  Negative for arthralgias, gait problem and myalgias.        Right hand pain     Integumentary:  Negative for color change, rash and wound.   Allergic/Immunologic: Negative for environmental allergies and food allergies.   Neurological:  Positive for facial asymmetry, numbness and headaches. Negative for dizziness, weakness and light-headedness.   Psychiatric/Behavioral:  Negative for behavioral problems and sleep disturbance.        Medical / Social / Family History     Past Medical History:   Diagnosis Date    Bell's palsy 10/15/2018    Left Side of Face    Degenerative cervical disc     GERD (gastroesophageal reflux disease)     History of COVID-19 01/19/2021    Hypertension     Hypothyroidism     Mild nonproliferative diabetic retinopathy of both eyes without macular edema associated with type 2 diabetes  mellitus 01/30/2023    Neck pain     Regular astigmatism of both eyes     From eye exam on 01/30/2023    Type 2 diabetes mellitus        Past Surgical History:   Procedure Laterality Date    ANTERIOR CRUCIATE LIGAMENT REPAIR      CHOLECYSTECTOMY      SINUS SURGERY         Social History    reports that he has never smoked. He has been exposed to tobacco smoke. He has never used smokeless tobacco. He reports that he does not drink alcohol and does not use drugs.    Family History  's family history includes Diabetes in his father and sister; Heart disease in his father; Hypertension in his brother, father, mother, and sister.    Medications and Allergies     Medications  Outpatient Medications Marked as Taking for the 10/10/23 encounter (Office Visit) with Jessika De Leon NP   Medication Sig Dispense Refill    alcohol swabs (ALCOHOL PREP PADS) PadM Apply 1 each topically 3 (three) times daily. 300 each 1    amLODIPine (NORVASC) 5 MG tablet Take 1 tablet (5 mg total) by mouth once daily. 90 tablet 1    aspirin 81 MG Chew Take 1 tablet (81 mg total) by mouth once daily. 90 tablet 1    atorvastatin (LIPITOR) 80 MG tablet Take 1 tablet (80 mg total) by mouth once daily. 90 tablet 1    blood sugar diagnostic Strp 100 strips by Misc.(Non-Drug; Combo Route) route once daily. Check blood sugar daily E11.9 100 each 11    cholecalciferol, vitamin D3, (VITAMIN D3) 25 mcg (1,000 unit) capsule Take 1 capsule (1,000 Units total) by mouth once daily. 90 capsule 1    empagliflozin (JARDIANCE) 25 mg tablet Take 1 tablet (25 mg total) by mouth once daily. 90 tablet 0    gabapentin (NEURONTIN) 300 MG capsule Take 2 capsules (600 mg total) by mouth 3 (three) times daily. 270 capsule 1    glimepiride (AMARYL) 4 MG tablet TAKE 2 TABLETS DAILY WITH BREAKFAST 180 tablet 0    insulin (LANTUS SOLOSTAR U-100 INSULIN) glargine 100 units/mL SubQ pen Inject 60 Units into the skin every evening. 1 each 0    lancets 33 gauge Misc Use as  directed to check blood glucose 100 each 11    levothyroxine (SYNTHROID) 200 MCG tablet Take 1 tablet (200 mcg total) by mouth before breakfast. 90 tablet 1    levothyroxine (SYNTHROID) 50 MCG tablet Take 1 tablet (50 mcg total) by mouth before breakfast. 90 tablet 0    lisinopriL (PRINIVIL,ZESTRIL) 40 MG tablet Take 1 tablet (40 mg total) by mouth once daily. (Patient taking differently: Take 20 mg by mouth once daily.) 90 tablet 1    magnesium oxide 420 mg Tab Take 1 tablet by mouth once daily. 90 each 1    NOVOLOG FLEXPEN U-100 INSULIN 100 unit/mL (3 mL) InPn pen Inject into the skin.      omega-3 acid ethyl esters (LOVAZA) 1 gram capsule Take 2 capsules (2 g total) by mouth 2 (two) times daily. 360 capsule 1    omeprazole (PRILOSEC) 20 MG capsule Take 1 capsule (20 mg total) by mouth once daily. 90 capsule 1       Allergies  Review of patient's allergies indicates:  No Known Allergies    Physical Examination     Vitals:    10/10/23 0902   BP: 120/70   Pulse: 67   Resp: 18   Temp: 97.7 °F (36.5 °C)     Physical Exam  Vitals and nursing note reviewed.   Constitutional:       Appearance: He is obese.   HENT:      Head: Normocephalic.      Right Ear: Tympanic membrane normal.      Left Ear: Tympanic membrane normal.      Nose: Nose normal.      Mouth/Throat:      Mouth: Mucous membranes are moist.      Pharynx: Oropharynx is clear. No posterior oropharyngeal erythema.   Eyes:      Conjunctiva/sclera: Conjunctivae normal.   Cardiovascular:      Rate and Rhythm: Normal rate and regular rhythm.      Pulses: Normal pulses.      Heart sounds: Normal heart sounds.   Pulmonary:      Effort: Pulmonary effort is normal.      Breath sounds: Normal breath sounds.   Abdominal:      General: Abdomen is flat. Bowel sounds are normal. There is no distension.      Palpations: Abdomen is soft.   Musculoskeletal:         General: No swelling or tenderness. Normal range of motion.      Right hand: Decreased sensation of the ulnar  distribution.      Cervical back: Normal range of motion.      Right lower leg: No edema.      Left lower leg: No edema.   Skin:     General: Skin is warm and dry.      Capillary Refill: Capillary refill takes less than 2 seconds.   Neurological:      Mental Status: He is alert. Mental status is at baseline.      GCS: GCS eye subscore is 4. GCS verbal subscore is 5. GCS motor subscore is 6.      Cranial Nerves: Cranial nerve deficit and facial asymmetry present.      Sensory: Sensory deficit present.      Motor: Motor function is intact.      Coordination: Coordination is intact.      Gait: Gait is intact.      Comments: He has some noted right sided facial weakness and numbness.   No weakness to any extremity.    Psychiatric:         Mood and Affect: Mood normal.         Behavior: Behavior normal.               Lab Results   Component Value Date    WBC 8.59 07/17/2023    HGB 15.1 07/17/2023    HCT 45.7 07/17/2023    MCV 86.1 07/17/2023     07/17/2023        CMP  Sodium   Date Value Ref Range Status   07/17/2023 137 136 - 145 mmol/L Final     Potassium   Date Value Ref Range Status   07/17/2023 4.2 3.5 - 5.1 mmol/L Final     Chloride   Date Value Ref Range Status   07/17/2023 102 98 - 107 mmol/L Final     CO2   Date Value Ref Range Status   07/17/2023 27 21 - 32 mmol/L Final     Glucose   Date Value Ref Range Status   07/17/2023 160 (H) 74 - 106 mg/dL Final     BUN   Date Value Ref Range Status   07/17/2023 18 7 - 18 mg/dL Final     Creatinine   Date Value Ref Range Status   07/17/2023 1.21 0.70 - 1.30 mg/dL Final     Calcium   Date Value Ref Range Status   07/17/2023 9.4 8.5 - 10.1 mg/dL Final     Total Protein   Date Value Ref Range Status   07/17/2023 8.3 (H) 6.4 - 8.2 g/dL Final     Albumin   Date Value Ref Range Status   07/17/2023 3.8 3.5 - 5.0 g/dL Final     Bilirubin, Total   Date Value Ref Range Status   07/17/2023 1.8 (H) >0.0 - 1.2 mg/dL Final     Alk Phos   Date Value Ref Range Status   07/17/2023  "81 45 - 115 U/L Final     AST   Date Value Ref Range Status   07/17/2023 40 (H) 15 - 37 U/L Final     ALT   Date Value Ref Range Status   07/17/2023 49 16 - 61 U/L Final     Anion Gap   Date Value Ref Range Status   07/17/2023 12 7 - 16 mmol/L Final     eGFR   Date Value Ref Range Status   07/17/2023 70 >=60 mL/min/1.73m2 Final     Procedures   Assessment and Plan (including Health Maintenance)   :    Plan:           Problem List Items Addressed This Visit          Neuro    Ramey's palsy    Current Assessment & Plan     Continued facial droop on right side from what he believes is a flare of his Bell's palsy. He does at time have some tingling to this side of face as well. He reports headaches 2-3 times per week that start behind his right ear and radiates up his head. He describes headaches as throbbing pain. Thinks this may be related to Bell's palsy as well. 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 He is seeing ENT at [facility] for this. We will hold off on further imaging and refer to Neurology for evaluation of headaches and facial droop/tingling.          Relevant Orders    Ambulatory referral/consult to Neurology       Orthopedic    Right hand pain    Current Assessment & Plan     Patient reports pain and numbness at times to right hand. States it feels at time as if it "locks up" and he is unable to use it. He reports a previous provider gave steroid injections in hand which helped some. He is requesting referral to Ortho for eval and possible injections.   Apparently referral was made and patient went to Ortho clinic, sat and waited over an hour to be seen and then was informed that they did not have [facility] referral so he could not be seen.   We will again make referral and do it internally this time so we can follow up and make sure it is done.          Relevant Orders    Ambulatory referral/consult to Orthopedics       Health Maintenance Topics with due " status: Not Due       Topic Last Completion Date    Eye Exam 01/30/2023    Lipid Panel 03/20/2023    Foot Exam 07/17/2023    Low Dose Statin 10/10/2023       Future Appointments   Date Time Provider Department Center   1/16/2024 10:20 AM Jessika De Leon NP [facility]        Health Maintenance Due   Topic Date Due    Colorectal Cancer Screening  Never done    TETANUS VACCINE  08/26/2018    Diabetes Urine Screening  09/12/2023    Hemoglobin A1c  10/17/2023        No follow-ups on file.     Signature:  Jessika De Leon NP  [address] [place]  [address]    Date of encounter: 10/10/23

## 2023-10-16 DIAGNOSIS — Z79.4 TYPE 2 DIABETES MELLITUS WITH DIABETIC POLYNEUROPATHY, WITH LONG-TERM CURRENT USE OF INSULIN: ICD-10-CM

## 2023-10-16 DIAGNOSIS — E11.42 TYPE 2 DIABETES MELLITUS WITH DIABETIC POLYNEUROPATHY, WITH LONG-TERM CURRENT USE OF INSULIN: ICD-10-CM

## 2023-12-14 ENCOUNTER — OFFICE VISIT (OUTPATIENT)
Dept: ORTHOPEDICS | Facility: CLINIC | Age: 58
End: 2023-12-14
Payer: OTHER GOVERNMENT

## 2023-12-14 DIAGNOSIS — G56.01 CARPAL TUNNEL SYNDROME OF RIGHT WRIST: ICD-10-CM

## 2023-12-14 DIAGNOSIS — M18.11 ARTHRITIS OF CARPOMETACARPAL (CMC) JOINT OF RIGHT THUMB: Primary | ICD-10-CM

## 2023-12-14 PROCEDURE — 99204 PR OFFICE/OUTPT VISIT, NEW, LEVL IV, 45-59 MIN: ICD-10-PCS | Mod: S$PBB,25,, | Performed by: ORTHOPAEDIC SURGERY

## 2023-12-14 PROCEDURE — 99204 OFFICE O/P NEW MOD 45 MIN: CPT | Mod: S$PBB,25,, | Performed by: ORTHOPAEDIC SURGERY

## 2023-12-14 PROCEDURE — 99999PBSHW PR PBB SHADOW TECHNICAL ONLY FILED TO HB: Mod: PBBFAC,,,

## 2023-12-14 PROCEDURE — 20600 DRAIN/INJ JOINT/BURSA W/O US: CPT | Mod: PBBFAC,RT | Performed by: ORTHOPAEDIC SURGERY

## 2023-12-14 PROCEDURE — 20600 SMALL JOINT ASPIRATION/INJECTION: R THUMB CMC: ICD-10-PCS | Mod: S$PBB,RT,, | Performed by: ORTHOPAEDIC SURGERY

## 2023-12-14 PROCEDURE — 99999PBSHW PR PBB SHADOW TECHNICAL ONLY FILED TO HB: ICD-10-PCS | Mod: PBBFAC,,,

## 2023-12-14 PROCEDURE — 99213 OFFICE O/P EST LOW 20 MIN: CPT | Mod: PBBFAC | Performed by: ORTHOPAEDIC SURGERY

## 2023-12-14 RX ORDER — TRIAMCINOLONE ACETONIDE 40 MG/ML
40 INJECTION, SUSPENSION INTRA-ARTICULAR; INTRAMUSCULAR
Status: DISCONTINUED | OUTPATIENT
Start: 2023-12-14 | End: 2023-12-14 | Stop reason: HOSPADM

## 2023-12-14 RX ADMIN — TRIAMCINOLONE ACETONIDE 40 MG: 400 INJECTION, SUSPENSION INTRA-ARTICULAR; INTRAMUSCULAR at 01:12

## 2023-12-14 NOTE — PROGRESS NOTES
HPI:   James Mcclellan is a pleasant 58 y.o. patient who reports to clinic for evaluation of right hand pain.     Injury onset and description: Pain has been present for years. Over the last year and it has become more painful. Also complains of Rt hand numbness and tingling.   EMG/NCS was done in [place] to confirm Rt Hand CTS  He has had injection in the past and reports the injections helped relieve his symptoms very well for about 6 months at a time.   Last injection was over a year ago.   Patient's occupation:   This is not a work related injury.   This injury has been non-responsive to conservative care. The pain is worse with repetitive use, and strenuous activity is very difficult.    his pain improves with rest.  he rates pain as a  7/10on the Visual Analog Scale.        PAST MEDICAL HISTORY:   Past Medical History:   Diagnosis Date    Bell's palsy 10/15/2018    Left Side of Face    Degenerative cervical disc     GERD (gastroesophageal reflux disease)     History of COVID-19 01/19/2021    Hypertension     Hypothyroidism     Mild nonproliferative diabetic retinopathy of both eyes without macular edema associated with type 2 diabetes mellitus 01/30/2023    Neck pain     Regular astigmatism of both eyes     From eye exam on 01/30/2023    Type 2 diabetes mellitus      PAST SURGICAL HISTORY:   Past Surgical History:   Procedure Laterality Date    ANTERIOR CRUCIATE LIGAMENT REPAIR      CHOLECYSTECTOMY      SINUS SURGERY       MEDICATIONS:    Current Outpatient Medications:     alcohol swabs (ALCOHOL PREP PADS) PadM, Apply 1 each topically 3 (three) times daily., Disp: 300 each, Rfl: 1    amLODIPine (NORVASC) 5 MG tablet, Take 1 tablet (5 mg total) by mouth once daily., Disp: 90 tablet, Rfl: 1    aspirin 81 MG Chew, Take 1 tablet (81 mg total) by mouth once daily., Disp: 90 tablet, Rfl: 1    atorvastatin (LIPITOR) 80 MG tablet, Take 1 tablet (80 mg total) by mouth once daily., Disp: 90 tablet, Rfl:  1    blood sugar diagnostic Strp, 100 strips by Misc.(Non-Drug; Combo Route) route once daily. Check blood sugar daily E11.9, Disp: 100 each, Rfl: 11    cholecalciferol, vitamin D3, (VITAMIN D3) 25 mcg (1,000 unit) capsule, Take 1 capsule (1,000 Units total) by mouth once daily., Disp: 90 capsule, Rfl: 1    empagliflozin (JARDIANCE) 25 mg tablet, Take 1 tablet (25 mg total) by mouth once daily., Disp: 90 tablet, Rfl: 0    gabapentin (NEURONTIN) 300 MG capsule, Take 2 capsules (600 mg total) by mouth 3 (three) times daily., Disp: 270 capsule, Rfl: 1    glimepiride (AMARYL) 4 MG tablet, TAKE 2 TABLETS DAILY WITH BREAKFAST, Disp: 180 tablet, Rfl: 0    insulin (LANTUS SOLOSTAR U-100 INSULIN) glargine 100 units/mL SubQ pen, Inject 60 Units into the skin every evening., Disp: 1 each, Rfl: 0    lancets 33 gauge Misc, Use as directed to check blood glucose, Disp: 100 each, Rfl: 11    levothyroxine (SYNTHROID) 200 MCG tablet, Take 1 tablet (200 mcg total) by mouth before breakfast., Disp: 90 tablet, Rfl: 1    levothyroxine (SYNTHROID) 50 MCG tablet, Take 1 tablet (50 mcg total) by mouth before breakfast., Disp: 90 tablet, Rfl: 0    lisinopriL (PRINIVIL,ZESTRIL) 40 MG tablet, Take 1 tablet (40 mg total) by mouth once daily. (Patient taking differently: Take 20 mg by mouth once daily.), Disp: 90 tablet, Rfl: 1    magnesium oxide 420 mg Tab, Take 1 tablet by mouth once daily., Disp: 90 each, Rfl: 1    NOVOLOG FLEXPEN U-100 INSULIN 100 unit/mL (3 mL) InPn pen, Inject into the skin., Disp: , Rfl:     omega-3 acid ethyl esters (LOVAZA) 1 gram capsule, Take 2 capsules (2 g total) by mouth 2 (two) times daily., Disp: 360 capsule, Rfl: 1    omeprazole (PRILOSEC) 20 MG capsule, Take 1 capsule (20 mg total) by mouth once daily., Disp: 90 capsule, Rfl: 1  ALLERGIES:   Review of patient's allergies indicates:  No Known Allergies      PHYSICAL EXAM:  VITAL SIGNS: There were no vitals taken for this visit.  General: Well-developed  well-nourished 58 y.o. malein no acute distress;Cardiovascular: Regular rhythm by palpation of distal pulse, normal color and temperature, no concerning varicosities on symptomatic side Lungs: No labored breathing or wheezing appreciated Neuro: Alert and oriented ×3 Psychiatric: well oriented to person, place and time, demonstrates normal mood and affect Skin: No rashes, lesions or ulcers, normal temperature, turgor, and texture on uninvolved extremity    General    Nursing note and vitals reviewed.  Constitutional: He is oriented to person, place, and time. He appears well-developed and well-nourished.   HENT:   Head: Normocephalic and atraumatic.   Nose: Nose normal.   Eyes: EOM are normal. Pupils are equal, round, and reactive to light.   Neck: Neck supple.   Cardiovascular:  Normal rate and intact distal pulses.            Pulmonary/Chest: Effort normal. No respiratory distress. He exhibits no tenderness.   Abdominal: Soft. He exhibits no distension. There is no abdominal tenderness.   Neurological: He is alert and oriented to person, place, and time. He has normal reflexes.   Psychiatric: He has a normal mood and affect. His behavior is normal. Judgment and thought content normal.             Right Hand/Wrist Exam     Inspection   Scars: Wrist - absent   Effusion: Wrist - absent   Bruising: Wrist - absent   Deformity: Wrist - deformity     Tests   Phalens sign: positive  Tinel's sign (median nerve): positive  Finkelstein's test: negative  Carpal Tunnel Compression Test: positive    Atrophy   Thenar:  negative  Intrinsic:  negative    Other     Neuorologic Exam    Median Distribution: abnormal  Ulnar Distribution: normal  Radial Distribution: normal          Muscle Strength   Right Upper Extremity   Wrist extension: 5/5   Wrist flexion: 5/5   : 4/5     Vascular Exam       Capillary Refill  Right Hand: normal capillary refill        + thumb CMC grind test.      IMAGING:  Prior Rt hand xrays reviewed-  moderate thumb CMC arthritis on the right.     ASSESSMENT:      ICD-10-CM ICD-9-CM   1. Arthritis of carpometacarpal (CMC) joint of right thumb  M18.11 716.94   2. Carpal tunnel syndrome of right wrist  G56.01 354.0       PLAN:     -Findings and treatment options were discussed with the patient  -All questions answered  Thumb injection given. Carpal tunnel surgery discussed. Wishes to hold off of surgery at this time.  Fu PRN    There are no Patient Instructions on file for this visit.  Orders Placed This Encounter   Procedures    Small Joint Aspiration/Injection: R thumb CMC     This order was created via procedure documentation     Small Joint Aspiration/Injection: R thumb CMC    Date/Time: 12/14/2023 1:00 PM    Performed by: Omer Reese MD  Authorized by: Omer Reese MD    Local anesthetic:  Bupivacaine 0.25% without epinephrine  Location:  Thumb  Site:  R thumb CMC  Needle size:  25 G  Medications:  40 mg triamcinolone acetonide 40 mg/mL

## 2024-01-09 DIAGNOSIS — E11.42 TYPE 2 DIABETES MELLITUS WITH DIABETIC POLYNEUROPATHY, WITH LONG-TERM CURRENT USE OF INSULIN: ICD-10-CM

## 2024-01-09 DIAGNOSIS — E11.69 HYPERLIPIDEMIA ASSOCIATED WITH TYPE 2 DIABETES MELLITUS: ICD-10-CM

## 2024-01-09 DIAGNOSIS — E78.5 HYPERLIPIDEMIA ASSOCIATED WITH TYPE 2 DIABETES MELLITUS: ICD-10-CM

## 2024-01-09 DIAGNOSIS — I10 ESSENTIAL HYPERTENSION, BENIGN: ICD-10-CM

## 2024-01-09 DIAGNOSIS — E55.9 VITAMIN D DEFICIENCY: ICD-10-CM

## 2024-01-09 DIAGNOSIS — Z79.4 TYPE 2 DIABETES MELLITUS WITH DIABETIC POLYNEUROPATHY, WITH LONG-TERM CURRENT USE OF INSULIN: ICD-10-CM

## 2024-01-09 DIAGNOSIS — K21.9 GASTROESOPHAGEAL REFLUX DISEASE, UNSPECIFIED WHETHER ESOPHAGITIS PRESENT: ICD-10-CM

## 2024-01-09 RX ORDER — MAGNESIUM OXIDE 420 MG/1
1 TABLET ORAL DAILY
Qty: 30 EACH | Refills: 0 | Status: SHIPPED | OUTPATIENT
Start: 2024-01-09

## 2024-01-09 RX ORDER — GUAIFENESIN 100 MG/5ML
81 LIQUID (ML) ORAL DAILY
Qty: 30 TABLET | Refills: 0 | Status: SHIPPED | OUTPATIENT
Start: 2024-01-09

## 2024-01-09 RX ORDER — GLUCOSAMINE SULFATE 1500 MG
1000 POWDER IN PACKET (EA) ORAL DAILY
Qty: 30 CAPSULE | Refills: 0 | Status: SHIPPED | OUTPATIENT
Start: 2024-01-09

## 2024-01-09 RX ORDER — GLIMEPIRIDE 4 MG/1
TABLET ORAL
Qty: 60 TABLET | Refills: 0 | Status: SHIPPED | OUTPATIENT
Start: 2024-01-09 | End: 2024-01-30 | Stop reason: SDUPTHER

## 2024-01-09 RX ORDER — LISINOPRIL 20 MG/1
20 TABLET ORAL DAILY
Qty: 30 TABLET | Refills: 0 | Status: SHIPPED | OUTPATIENT
Start: 2024-01-09 | End: 2024-01-30 | Stop reason: SDUPTHER

## 2024-01-09 RX ORDER — OMEGA-3-ACID ETHYL ESTERS 1 G/1
2 CAPSULE, LIQUID FILLED ORAL 2 TIMES DAILY
Qty: 60 CAPSULE | Refills: 0 | Status: SHIPPED | OUTPATIENT
Start: 2024-01-09 | End: 2024-01-30 | Stop reason: SDUPTHER

## 2024-01-09 RX ORDER — OMEPRAZOLE 20 MG/1
20 CAPSULE, DELAYED RELEASE ORAL DAILY
Qty: 30 CAPSULE | Refills: 0 | Status: SHIPPED | OUTPATIENT
Start: 2024-01-09 | End: 2024-01-30 | Stop reason: SDUPTHER

## 2024-01-09 NOTE — TELEPHONE ENCOUNTER
Patients wife called, requesting 30 day medication refill on following medications, until he can get into clinic.  Patient is taking lisinopril 20mg, has been halfing 40mg, requesting 20mg tablet, instead, [facility] [place].  She is requesting we resend test strips to [facility] in [place], was unable to get filled at the time we sent them, pharmacy is requesting a new prescription be sent.

## 2024-01-30 ENCOUNTER — OFFICE VISIT (OUTPATIENT)
Dept: FAMILY MEDICINE | Facility: CLINIC | Age: 59
End: 2024-01-30
Payer: OTHER GOVERNMENT

## 2024-01-30 VITALS
OXYGEN SATURATION: 96 % | BODY MASS INDEX: 32.58 KG/M2 | RESPIRATION RATE: 22 BRPM | HEIGHT: 69 IN | DIASTOLIC BLOOD PRESSURE: 69 MMHG | SYSTOLIC BLOOD PRESSURE: 108 MMHG | HEART RATE: 68 BPM | TEMPERATURE: 98 F | WEIGHT: 220 LBS

## 2024-01-30 DIAGNOSIS — I10 ESSENTIAL HYPERTENSION, BENIGN: Primary | ICD-10-CM

## 2024-01-30 DIAGNOSIS — Z79.4 TYPE 2 DIABETES MELLITUS WITH DIABETIC POLYNEUROPATHY, WITH LONG-TERM CURRENT USE OF INSULIN: ICD-10-CM

## 2024-01-30 DIAGNOSIS — E11.42 TYPE 2 DIABETES MELLITUS WITH DIABETIC POLYNEUROPATHY, WITH LONG-TERM CURRENT USE OF INSULIN: ICD-10-CM

## 2024-01-30 DIAGNOSIS — K21.9 GASTROESOPHAGEAL REFLUX DISEASE, UNSPECIFIED WHETHER ESOPHAGITIS PRESENT: ICD-10-CM

## 2024-01-30 DIAGNOSIS — E03.9 HYPOTHYROIDISM, UNSPECIFIED TYPE: ICD-10-CM

## 2024-01-30 DIAGNOSIS — E11.69 HYPERLIPIDEMIA ASSOCIATED WITH TYPE 2 DIABETES MELLITUS: ICD-10-CM

## 2024-01-30 DIAGNOSIS — E78.5 HYPERLIPIDEMIA ASSOCIATED WITH TYPE 2 DIABETES MELLITUS: ICD-10-CM

## 2024-01-30 PROCEDURE — 99214 OFFICE O/P EST MOD 30 MIN: CPT | Mod: ,,,

## 2024-01-30 PROCEDURE — 82570 ASSAY OF URINE CREATININE: CPT | Mod: ,,, | Performed by: CLINICAL MEDICAL LABORATORY

## 2024-01-30 PROCEDURE — 82043 UR ALBUMIN QUANTITATIVE: CPT | Mod: ,,, | Performed by: CLINICAL MEDICAL LABORATORY

## 2024-01-30 RX ORDER — LANCETS 33 GAUGE
EACH MISCELLANEOUS
Qty: 100 EACH | Refills: 11 | Status: SHIPPED | OUTPATIENT
Start: 2024-01-30 | End: 2024-04-30 | Stop reason: ALTCHOICE

## 2024-01-30 RX ORDER — INSULIN GLARGINE 100 [IU]/ML
60 INJECTION, SOLUTION SUBCUTANEOUS NIGHTLY
Qty: 30 EACH | Refills: 1
Start: 2024-01-30 | End: 2024-04-30 | Stop reason: SDUPTHER

## 2024-01-30 RX ORDER — LEVOTHYROXINE SODIUM 200 UG/1
200 TABLET ORAL
Qty: 90 TABLET | Refills: 1 | Status: SHIPPED | OUTPATIENT
Start: 2024-01-30 | End: 2024-05-14 | Stop reason: SDUPTHER

## 2024-01-30 RX ORDER — GABAPENTIN 300 MG/1
600 CAPSULE ORAL 3 TIMES DAILY
Qty: 270 CAPSULE | Refills: 1 | Status: SHIPPED | OUTPATIENT
Start: 2024-01-30

## 2024-01-30 RX ORDER — OMEGA-3-ACID ETHYL ESTERS 1 G/1
2 CAPSULE, LIQUID FILLED ORAL 2 TIMES DAILY
Qty: 60 CAPSULE | Refills: 2 | Status: SHIPPED | OUTPATIENT
Start: 2024-01-30 | End: 2024-03-04 | Stop reason: SDUPTHER

## 2024-01-30 RX ORDER — GLIMEPIRIDE 4 MG/1
TABLET ORAL
Qty: 60 TABLET | Refills: 2 | Status: SHIPPED | OUTPATIENT
Start: 2024-01-30 | End: 2024-03-04 | Stop reason: SDUPTHER

## 2024-01-30 RX ORDER — AMLODIPINE BESYLATE 5 MG/1
5 TABLET ORAL DAILY
Qty: 90 TABLET | Refills: 1 | Status: SHIPPED | OUTPATIENT
Start: 2024-01-30 | End: 2024-05-14 | Stop reason: SDUPTHER

## 2024-01-30 RX ORDER — ATORVASTATIN CALCIUM 80 MG/1
80 TABLET, FILM COATED ORAL DAILY
Qty: 90 TABLET | Refills: 1 | Status: SHIPPED | OUTPATIENT
Start: 2024-01-30 | End: 2024-05-14 | Stop reason: SDUPTHER

## 2024-01-30 RX ORDER — OMEPRAZOLE 20 MG/1
20 CAPSULE, DELAYED RELEASE ORAL DAILY
Qty: 90 CAPSULE | Refills: 0 | Status: SHIPPED | OUTPATIENT
Start: 2024-01-30 | End: 2024-03-04 | Stop reason: SDUPTHER

## 2024-01-30 RX ORDER — LISINOPRIL 20 MG/1
20 TABLET ORAL DAILY
Qty: 90 TABLET | Refills: 0 | Status: SHIPPED | OUTPATIENT
Start: 2024-01-30 | End: 2024-03-04 | Stop reason: SDUPTHER

## 2024-01-30 NOTE — PROGRESS NOTES
ANDREA CARRANZA   [address] [id number] [facility] [address]  [place]  [address]      PATIENT NAME: James Mcclellan  : 1965  DATE: 24  MRN: [id number]        Reason for Visit / Chief Complaint: Follow-up (James Mcclellan 58 year old white male presents for a 6 month follow up, lab work and medication refills. ), Diabetes, Hyperlipidemia, Hypothyroidism, and Hypertension         History of Present Illness / Problem Focused Workflow     59 y/o male presents to clinic for follow up, medication refills and lab work today. Denies any concerns or questions at present      Review of Systems     @Review of Systems   Constitutional:  Negative for chills, fatigue and fever.   HENT:  Negative for nasal congestion, ear discharge, ear pain, rhinorrhea, sinus pressure/congestion and sore throat.    Respiratory:  Negative for cough, chest tightness, shortness of breath, wheezing and stridor.    Cardiovascular:  Negative for palpitations and claudication.   Gastrointestinal:  Negative for abdominal pain, constipation, diarrhea, nausea, vomiting and reflux.   Genitourinary:  Negative for dysuria, flank pain, frequency, hematuria and urgency.   Musculoskeletal:  Negative for myalgias.   Neurological:  Negative for dizziness, weakness, light-headedness and headaches.   Psychiatric/Behavioral:  Negative for suicidal ideas.        Medical / Social / Family History     Past Medical History:   Diagnosis Date    Bell's palsy 10/15/2018    Left Side of Face    Degenerative cervical disc     GERD (gastroesophageal reflux disease)     History of COVID-19 2021    Hypertension     Hypothyroidism     Mild nonproliferative diabetic retinopathy of both eyes without macular edema associated with type 2 diabetes mellitus 2023    Neck pain     Regular astigmatism of both eyes     From eye exam on 2023    Type 2 diabetes mellitus        Past Surgical History:   Procedure Laterality Date    ANTERIOR CRUCIATE LIGAMENT  REPAIR      CHOLECYSTECTOMY      SINUS SURGERY             Medications and Allergies     Medications  Outpatient Medications Marked as Taking for the 1/30/24 encounter (Office Visit) with Eliecer Graham FNP   Medication Sig Dispense Refill    alcohol swabs (ALCOHOL PREP PADS) PadM Apply 1 each topically 3 (three) times daily. 300 each 1    aspirin 81 MG Chew Take 1 tablet (81 mg total) by mouth once daily. 30 tablet 0    cholecalciferol, vitamin D3, (VITAMIN D3) 25 mcg (1,000 unit) capsule Take 1 capsule (1,000 Units total) by mouth once daily. 30 capsule 0    magnesium oxide 420 mg Tab Take 1 tablet by mouth once daily. 30 each 0    NOVOLOG FLEXPEN U-100 INSULIN 100 unit/mL (3 mL) InPn pen Inject into the skin.      [DISCONTINUED] amLODIPine (NORVASC) 5 MG tablet Take 1 tablet (5 mg total) by mouth once daily. 90 tablet 1    [DISCONTINUED] atorvastatin (LIPITOR) 80 MG tablet Take 1 tablet (80 mg total) by mouth once daily. 90 tablet 1    [DISCONTINUED] blood sugar diagnostic Strp 100 strips by Misc.(Non-Drug; Combo Route) route once daily. Check blood sugar daily E11.9 100 each 11    [DISCONTINUED] empagliflozin (JARDIANCE) 25 mg tablet Take 1 tablet (25 mg total) by mouth once daily. 30 tablet 0    [DISCONTINUED] gabapentin (NEURONTIN) 300 MG capsule Take 2 capsules (600 mg total) by mouth 3 (three) times daily. 270 capsule 1    [DISCONTINUED] glimepiride (AMARYL) 4 MG tablet TAKE 2 TABLETS DAILY WITH BREAKFAST 60 tablet 0    [DISCONTINUED] insulin (LANTUS SOLOSTAR U-100 INSULIN) glargine 100 units/mL SubQ pen Inject 60 Units into the skin every evening. 1 each 0    [DISCONTINUED] levothyroxine (SYNTHROID) 200 MCG tablet Take 1 tablet (200 mcg total) by mouth before breakfast. 90 tablet 1    [DISCONTINUED] levothyroxine (SYNTHROID) 50 MCG tablet Take 1 tablet (50 mcg total) by mouth before breakfast. 90 tablet 0    [DISCONTINUED] lisinopriL (PRINIVIL,ZESTRIL) 20 MG tablet Take 1 tablet (20 mg total) by mouth  once daily. 30 tablet 0    [DISCONTINUED] omega-3 acid ethyl esters (LOVAZA) 1 gram capsule Take 2 capsules (2 g total) by mouth 2 (two) times daily. 60 capsule 0    [DISCONTINUED] omeprazole (PRILOSEC) 20 MG capsule Take 1 capsule (20 mg total) by mouth once daily. 30 capsule 0       Allergies  Review of patient's allergies indicates:  No Known Allergies    Physical Examination     Vitals:    01/30/24 1146   BP: 108/69   Pulse: 68   Resp: (!) 22   Temp: 97.8 °F (36.6 °C)     Physical Exam  Vitals and nursing note reviewed.   Constitutional:       General: He is awake.      Appearance: Normal appearance.   HENT:      Head: Normocephalic.      Right Ear: Tympanic membrane, ear canal and external ear normal.      Left Ear: Tympanic membrane, ear canal and external ear normal.      Nose: Nose normal.      Mouth/Throat:      Lips: Pink.      Mouth: Mucous membranes are moist.      Pharynx: Oropharynx is clear. Uvula midline.   Cardiovascular:      Rate and Rhythm: Normal rate and regular rhythm.      Heart sounds: Normal heart sounds, S1 normal and S2 normal.   Pulmonary:      Effort: Pulmonary effort is normal. No respiratory distress.      Breath sounds: Normal breath sounds. No decreased breath sounds, wheezing, rhonchi or rales.   Abdominal:      General: Bowel sounds are normal.      Palpations: Abdomen is soft.      Tenderness: There is no abdominal tenderness.   Musculoskeletal:      Cervical back: Normal range of motion.   Skin:     General: Skin is warm.      Capillary Refill: Capillary refill takes less than 2 seconds.   Neurological:      Mental Status: He is alert and oriented to person, place, and time.   Psychiatric:         Thought Content: Thought content does not include homicidal or suicidal ideation. Thought content does not include homicidal or suicidal plan.               Procedures   Assessment and Plan (including Health Maintenance)   :    Plan:     Problem List Items Addressed This Visit           "Cardiac/Vascular    Essential hypertension, benign - Primary    Current Assessment & Plan     Monitor BP daily and keep BP daily log to bring to next visit. Exercise at least 5 times a week. Keep scheduled appts. RTC sooner if BP is staying <120/70. Dash diet.    DASH- includes foods rich in K+, calcium and Magnesium.The diet limits foods high in sodium, saturated fats and added sugars. This is a flexible balanced eating plan that helps create heart healthy eating style for life. Diet is rich in vegetable, whole grains and fruits. It includes fat free or low fat dairy products, fish, poultry, nuts. Chose foods high in K+, calcium, magnesium, fiber, protein, and low in saturated fats and sodium.          Relevant Medications    amLODIPine (NORVASC) 5 MG tablet    lisinopriL (PRINIVIL,ZESTRIL) 20 MG tablet    Other Relevant Orders    CBC Auto Differential    Comprehensive Metabolic Panel    Hyperlipidemia associated with type 2 diabetes mellitus    Current Assessment & Plan     Lipid panel obtained at today's visit. Goal LDL is less than 70. Continue current meds and low fat/low cholesterol diet with increased exercise as tolerated. Will follow up with labs. Patient to follow up in 3 months or as needed    A few changes in your diet can reduce cholesterol and improve your heart health. Saturated fats, found primarily in red meat and full-fat dairy products, raise your total cholesterol. Decreasing your consumption of saturated fats can reduce your low-density lipoprotein (LDL) cholesterol. rans fats, sometimes listed on food labels as "partially hydrogenated vegetable oil," are often used in margarines and store-bought cookies, crackers and cakes.     Trans fats raise overall cholesterol levels. Omega-3 fatty acids don't affect LDL cholesterol. But they have other heart-healthy benefits, including reducing blood pressure. Foods with omega-3 fatty acids include salmon, mackerel, herring, walnuts and flaxseeds.Soluble " fiber can reduce the absorption of cholesterol into your bloodstream. Soluble fiber is found in such foods as oatmeal, kidney beans, [place] sprouts, apples and pears.  at least 30 minutes of exercise five times a week or vigorous aerobic activity for 20 minutes three times a week if tolerated.           Relevant Medications    atorvastatin (LIPITOR) 80 MG tablet    glimepiride (AMARYL) 4 MG tablet    insulin (LANTUS SOLOSTAR U-100 INSULIN) glargine 100 units/mL SubQ pen    omega-3 acid ethyl esters (LOVAZA) 1 gram capsule       Endocrine    Type 2 diabetes mellitus    Current Assessment & Plan     HgbA1C obtained at today's visit. Goal is less than 7. Continue current meds and low carb/no concentrated sweets diet with increased exercise as tolerated. Will follow up with lab results. Patient to follow up in 3 months or as needed.          Relevant Medications    blood sugar diagnostic Strp    empagliflozin (JARDIANCE) 25 mg tablet    gabapentin (NEURONTIN) 300 MG capsule    glimepiride (AMARYL) 4 MG tablet    insulin (LANTUS SOLOSTAR U-100 INSULIN) glargine 100 units/mL SubQ pen    lancets 33 gauge Misc    Other Relevant Orders    Hemoglobin A1C    Microalbumin/Creatinine Ratio, Urine (Completed)    Hypothyroidism    Overview     TSH ordered; continue current medication. take medication on empty stomach         Current Assessment & Plan     Take medication with a glass of water on an empty stomach daily, wait 30 minutes before consuming anything else. Separate from vitamins, iron  by 6 hours          Relevant Medications    levothyroxine (SYNTHROID) 200 MCG tablet    Other Relevant Orders    TSH       GI    Gastroesophageal reflux disease    Current Assessment & Plan      Reviewed pathology of current symptoms, medication side effects/risk/benefits/directions on taking medications, and to take medication as prescribed. Take medication as prescribed. Remain upright for at least one hour after eating or drinking,  raise HOB 6-8 inches. Eat small, frequent bland meals instead of large meals. Avoid greasy/spicy/acidic foods. Avoid alcohol and NSAIDS. Monitor for changes in bowel color, texture, etc. Discussed what warrants emergent follow-up or treatment. Patient is to go to ED if they begin vomiting and it looks like blood or coffee grounds. Weight loss may help people who are overweight to reduce acid reflux. Weight loss has a number of other health benefits including decreases risk of heart disease and type 2 DM.         Relevant Medications    omeprazole (PRILOSEC) 20 MG capsule       Health Maintenance Topics with due status: Not Due       Topic Last Completion Date    Lipid Panel 03/20/2023    Foot Exam 07/17/2023    Low Dose Statin 01/30/2024    Diabetes Urine Screening 01/30/2024       Future Appointments   Date Time Provider Department Center   2/6/2024  8:45 AM Marino Calle MD [facility]   4/30/2024 10:20 AM Eliecer Graham FNP [facility]        Health Maintenance Due   Topic Date Due    Colorectal Cancer Screening  Never done    TETANUS VACCINE  08/26/2018    Hemoglobin A1c  10/17/2023    Eye Exam  01/30/2024        Follow up in about 3 months (around 4/30/2024).     Signature:  ANDREA CARRANZA  [facility]  [address] [address] [place]  [address]    Date of encounter: 1/30/24

## 2024-01-31 LAB
CREAT UR-MCNC: 38 MG/DL (ref 39–259)
MICROALBUMIN UR-MCNC: 1.4 MG/DL (ref 0–2.8)
MICROALBUMIN/CREAT RATIO PNL UR: 36.8 MG/G (ref 0–30)

## 2024-02-05 PROBLEM — E03.9 HYPOTHYROIDISM: Status: ACTIVE | Noted: 2024-02-05

## 2024-02-05 PROBLEM — K21.9 GASTROESOPHAGEAL REFLUX DISEASE: Status: ACTIVE | Noted: 2024-02-05

## 2024-02-05 NOTE — ASSESSMENT & PLAN NOTE
"Lipid panel obtained at today's visit. Goal LDL is less than 70. Continue current meds and low fat/low cholesterol diet with increased exercise as tolerated. Will follow up with labs. Patient to follow up in 3 months or as needed    A few changes in your diet can reduce cholesterol and improve your heart health. Saturated fats, found primarily in red meat and full-fat dairy products, raise your total cholesterol. Decreasing your consumption of saturated fats can reduce your low-density lipoprotein (LDL) cholesterol. rans fats, sometimes listed on food labels as "partially hydrogenated vegetable oil," are often used in margarines and store-bought cookies, crackers and cakes.     Trans fats raise overall cholesterol levels. Omega-3 fatty acids don't affect LDL cholesterol. But they have other heart-healthy benefits, including reducing blood pressure. Foods with omega-3 fatty acids include salmon, mackerel, herring, walnuts and flaxseeds.Soluble fiber can reduce the absorption of cholesterol into your bloodstream. Soluble fiber is found in such foods as oatmeal, kidney beans, [place] sprouts, apples and pears.  at least 30 minutes of exercise five times a week or vigorous aerobic activity for 20 minutes three times a week if tolerated.    "

## 2024-02-06 ENCOUNTER — OFFICE VISIT (OUTPATIENT)
Dept: NEUROLOGY | Facility: CLINIC | Age: 59
End: 2024-02-06
Payer: OTHER GOVERNMENT

## 2024-02-06 VITALS
DIASTOLIC BLOOD PRESSURE: 70 MMHG | RESPIRATION RATE: 18 BRPM | WEIGHT: 220 LBS | HEIGHT: 69 IN | OXYGEN SATURATION: 100 % | HEART RATE: 72 BPM | BODY MASS INDEX: 32.58 KG/M2 | SYSTOLIC BLOOD PRESSURE: 132 MMHG

## 2024-02-06 DIAGNOSIS — G51.0 BELL'S PALSY: Primary | ICD-10-CM

## 2024-02-06 PROCEDURE — 99204 OFFICE O/P NEW MOD 45 MIN: CPT | Mod: S$PBB,,, | Performed by: PSYCHIATRY & NEUROLOGY

## 2024-02-06 PROCEDURE — 99215 OFFICE O/P EST HI 40 MIN: CPT | Mod: PBBFAC | Performed by: PSYCHIATRY & NEUROLOGY

## 2024-02-06 NOTE — PATIENT INSTRUCTIONS
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

## 2024-03-04 DIAGNOSIS — I10 ESSENTIAL HYPERTENSION, BENIGN: ICD-10-CM

## 2024-03-04 DIAGNOSIS — E78.5 HYPERLIPIDEMIA ASSOCIATED WITH TYPE 2 DIABETES MELLITUS: ICD-10-CM

## 2024-03-04 DIAGNOSIS — E11.69 HYPERLIPIDEMIA ASSOCIATED WITH TYPE 2 DIABETES MELLITUS: ICD-10-CM

## 2024-03-04 DIAGNOSIS — K21.9 GASTROESOPHAGEAL REFLUX DISEASE, UNSPECIFIED WHETHER ESOPHAGITIS PRESENT: ICD-10-CM

## 2024-03-04 DIAGNOSIS — E55.9 VITAMIN D DEFICIENCY: ICD-10-CM

## 2024-03-04 DIAGNOSIS — Z79.4 TYPE 2 DIABETES MELLITUS WITH DIABETIC POLYNEUROPATHY, WITH LONG-TERM CURRENT USE OF INSULIN: ICD-10-CM

## 2024-03-04 DIAGNOSIS — E11.42 TYPE 2 DIABETES MELLITUS WITH DIABETIC POLYNEUROPATHY, WITH LONG-TERM CURRENT USE OF INSULIN: ICD-10-CM

## 2024-03-04 RX ORDER — OMEPRAZOLE 20 MG/1
20 CAPSULE, DELAYED RELEASE ORAL DAILY
Qty: 90 CAPSULE | Refills: 1 | Status: SHIPPED | OUTPATIENT
Start: 2024-03-04 | End: 2024-03-12 | Stop reason: SDUPTHER

## 2024-03-04 RX ORDER — LISINOPRIL 20 MG/1
20 TABLET ORAL DAILY
Qty: 90 TABLET | Refills: 1 | Status: SHIPPED | OUTPATIENT
Start: 2024-03-04 | End: 2024-03-12 | Stop reason: SDUPTHER

## 2024-03-04 RX ORDER — MAGNESIUM OXIDE 420 MG/1
1 TABLET ORAL DAILY
Qty: 90 EACH | Refills: 1 | Status: SHIPPED | OUTPATIENT
Start: 2024-03-04 | End: 2024-03-12 | Stop reason: SDUPTHER

## 2024-03-04 RX ORDER — GLIMEPIRIDE 4 MG/1
TABLET ORAL
Qty: 180 TABLET | Refills: 1 | Status: SHIPPED | OUTPATIENT
Start: 2024-03-04 | End: 2024-03-12 | Stop reason: SDUPTHER

## 2024-03-04 RX ORDER — VIT C/E/ZN/COPPR/LUTEIN/ZEAXAN 250MG-90MG
1000 CAPSULE ORAL DAILY
Qty: 90 CAPSULE | Refills: 1 | Status: SHIPPED | OUTPATIENT
Start: 2024-03-04 | End: 2024-03-12 | Stop reason: SDUPTHER

## 2024-03-04 RX ORDER — NAPROXEN SODIUM 220 MG/1
81 TABLET, FILM COATED ORAL DAILY
Qty: 90 TABLET | Refills: 1 | Status: SHIPPED | OUTPATIENT
Start: 2024-03-04 | End: 2024-03-12 | Stop reason: SDUPTHER

## 2024-03-04 RX ORDER — OMEGA-3-ACID ETHYL ESTERS 1 G/1
2 CAPSULE, LIQUID FILLED ORAL 2 TIMES DAILY
Qty: 60 CAPSULE | Refills: 3 | Status: SHIPPED | OUTPATIENT
Start: 2024-03-04 | End: 2024-03-12 | Stop reason: SDUPTHER

## 2024-03-12 DIAGNOSIS — K21.9 GASTROESOPHAGEAL REFLUX DISEASE, UNSPECIFIED WHETHER ESOPHAGITIS PRESENT: ICD-10-CM

## 2024-03-12 DIAGNOSIS — I10 ESSENTIAL HYPERTENSION, BENIGN: ICD-10-CM

## 2024-03-12 DIAGNOSIS — E11.69 HYPERLIPIDEMIA ASSOCIATED WITH TYPE 2 DIABETES MELLITUS: ICD-10-CM

## 2024-03-12 DIAGNOSIS — E11.42 TYPE 2 DIABETES MELLITUS WITH DIABETIC POLYNEUROPATHY, WITH LONG-TERM CURRENT USE OF INSULIN: ICD-10-CM

## 2024-03-12 DIAGNOSIS — Z79.4 TYPE 2 DIABETES MELLITUS WITH DIABETIC POLYNEUROPATHY, WITH LONG-TERM CURRENT USE OF INSULIN: ICD-10-CM

## 2024-03-12 DIAGNOSIS — E55.9 VITAMIN D DEFICIENCY: ICD-10-CM

## 2024-03-12 DIAGNOSIS — E78.5 HYPERLIPIDEMIA ASSOCIATED WITH TYPE 2 DIABETES MELLITUS: ICD-10-CM

## 2024-03-12 RX ORDER — OMEPRAZOLE 20 MG/1
20 CAPSULE, DELAYED RELEASE ORAL DAILY
Qty: 90 CAPSULE | Refills: 1 | Status: SHIPPED | OUTPATIENT
Start: 2024-03-12 | End: 2024-05-14 | Stop reason: SDUPTHER

## 2024-03-12 RX ORDER — LISINOPRIL 20 MG/1
20 TABLET ORAL DAILY
Qty: 90 TABLET | Refills: 1 | Status: SHIPPED | OUTPATIENT
Start: 2024-03-12 | End: 2024-05-14 | Stop reason: SDUPTHER

## 2024-03-12 RX ORDER — MAGNESIUM OXIDE 420 MG/1
1 TABLET ORAL DAILY
Qty: 90 EACH | Refills: 1 | Status: SHIPPED | OUTPATIENT
Start: 2024-03-12 | End: 2024-05-14 | Stop reason: SDUPTHER

## 2024-03-12 RX ORDER — VIT C/E/ZN/COPPR/LUTEIN/ZEAXAN 250MG-90MG
1000 CAPSULE ORAL DAILY
Qty: 90 CAPSULE | Refills: 1 | Status: SHIPPED | OUTPATIENT
Start: 2024-03-12 | End: 2024-05-14 | Stop reason: SDUPTHER

## 2024-03-12 RX ORDER — NAPROXEN SODIUM 220 MG/1
81 TABLET, FILM COATED ORAL DAILY
Qty: 90 TABLET | Refills: 1 | Status: SHIPPED | OUTPATIENT
Start: 2024-03-12

## 2024-03-12 RX ORDER — GLIMEPIRIDE 4 MG/1
TABLET ORAL
Qty: 180 TABLET | Refills: 1 | Status: SHIPPED | OUTPATIENT
Start: 2024-03-12 | End: 2024-05-14 | Stop reason: SDUPTHER

## 2024-03-12 RX ORDER — OMEGA-3-ACID ETHYL ESTERS 1 G/1
2 CAPSULE, LIQUID FILLED ORAL 2 TIMES DAILY
Qty: 360 CAPSULE | Refills: 1 | Status: SHIPPED | OUTPATIENT
Start: 2024-03-12 | End: 2024-05-14 | Stop reason: SDUPTHER

## 2024-04-30 DIAGNOSIS — Z79.4 TYPE 2 DIABETES MELLITUS WITH DIABETIC POLYNEUROPATHY, WITH LONG-TERM CURRENT USE OF INSULIN: Primary | ICD-10-CM

## 2024-04-30 DIAGNOSIS — E11.42 TYPE 2 DIABETES MELLITUS WITH DIABETIC POLYNEUROPATHY, WITH LONG-TERM CURRENT USE OF INSULIN: ICD-10-CM

## 2024-04-30 DIAGNOSIS — E11.42 TYPE 2 DIABETES MELLITUS WITH DIABETIC POLYNEUROPATHY, WITH LONG-TERM CURRENT USE OF INSULIN: Primary | ICD-10-CM

## 2024-04-30 DIAGNOSIS — Z79.4 TYPE 2 DIABETES MELLITUS WITH DIABETIC POLYNEUROPATHY, WITH LONG-TERM CURRENT USE OF INSULIN: ICD-10-CM

## 2024-04-30 RX ORDER — INSULIN GLARGINE 100 [IU]/ML
60 INJECTION, SOLUTION SUBCUTANEOUS NIGHTLY
Qty: 54 ML | Refills: 0 | Status: SHIPPED | OUTPATIENT
Start: 2024-04-30 | End: 2024-05-14 | Stop reason: SDUPTHER

## 2024-04-30 RX ORDER — LANCETS 28 GAUGE
EACH MISCELLANEOUS
Qty: 100 EACH | Refills: 1 | Status: SHIPPED | OUTPATIENT
Start: 2024-04-30 | End: 2024-05-14 | Stop reason: SDUPTHER

## 2024-04-30 RX ORDER — BLOOD-GLUCOSE METER
KIT MISCELLANEOUS
Qty: 100 STRIP | Refills: 1 | Status: SHIPPED | OUTPATIENT
Start: 2024-04-30 | End: 2024-05-14 | Stop reason: SDUPTHER

## 2024-04-30 RX ORDER — PEN NEEDLE, DIABETIC 30 GX3/16"
NEEDLE, DISPOSABLE MISCELLANEOUS
Qty: 90 EACH | Refills: 1 | Status: SHIPPED | OUTPATIENT
Start: 2024-04-30 | End: 2024-05-14 | Stop reason: SDUPTHER

## 2024-04-30 RX ORDER — NEOMYCIN SULFATE, POLYMYXIN B SULFATE, AND DEXAMETHASONE 3.5; 10000; 1 MG/G; [USP'U]/G; MG/G
OINTMENT OPHTHALMIC
COMMUNITY
Start: 2024-04-29

## 2024-04-30 NOTE — TELEPHONE ENCOUNTER
Please see attached refill request. Pt has an upcoming appt on 5/14/2024 but will be out of his medication and supplies before then.

## 2024-05-07 LAB
LEFT EYE DM RETINOPATHY: POSITIVE
RIGHT EYE DM RETINOPATHY: POSITIVE

## 2024-05-14 ENCOUNTER — OFFICE VISIT (OUTPATIENT)
Dept: FAMILY MEDICINE | Facility: CLINIC | Age: 59
End: 2024-05-14
Payer: OTHER GOVERNMENT

## 2024-05-14 ENCOUNTER — PATIENT OUTREACH (OUTPATIENT)
Dept: ADMINISTRATIVE | Facility: HOSPITAL | Age: 59
End: 2024-05-14

## 2024-05-14 VITALS
RESPIRATION RATE: 17 BRPM | BODY MASS INDEX: 32.71 KG/M2 | WEIGHT: 220.81 LBS | TEMPERATURE: 98 F | HEART RATE: 69 BPM | HEIGHT: 69 IN | SYSTOLIC BLOOD PRESSURE: 119 MMHG | DIASTOLIC BLOOD PRESSURE: 77 MMHG | OXYGEN SATURATION: 96 %

## 2024-05-14 DIAGNOSIS — E11.42 TYPE 2 DIABETES MELLITUS WITH DIABETIC POLYNEUROPATHY, WITH LONG-TERM CURRENT USE OF INSULIN: Primary | ICD-10-CM

## 2024-05-14 DIAGNOSIS — I10 ESSENTIAL HYPERTENSION, BENIGN: ICD-10-CM

## 2024-05-14 DIAGNOSIS — E55.9 VITAMIN D DEFICIENCY: ICD-10-CM

## 2024-05-14 DIAGNOSIS — E03.9 HYPOTHYROIDISM, UNSPECIFIED TYPE: ICD-10-CM

## 2024-05-14 DIAGNOSIS — E11.9 DIABETES MELLITUS CASE MANAGEMENT PATIENT: ICD-10-CM

## 2024-05-14 DIAGNOSIS — H04.129 DRY EYE: ICD-10-CM

## 2024-05-14 DIAGNOSIS — E78.5 HYPERLIPIDEMIA ASSOCIATED WITH TYPE 2 DIABETES MELLITUS: ICD-10-CM

## 2024-05-14 DIAGNOSIS — E11.69 HYPERLIPIDEMIA ASSOCIATED WITH TYPE 2 DIABETES MELLITUS: ICD-10-CM

## 2024-05-14 DIAGNOSIS — E11.42 TYPE 2 DIABETES MELLITUS WITH DIABETIC POLYNEUROPATHY, WITH LONG-TERM CURRENT USE OF INSULIN: ICD-10-CM

## 2024-05-14 DIAGNOSIS — Z79.4 TYPE 2 DIABETES MELLITUS WITH DIABETIC POLYNEUROPATHY, WITH LONG-TERM CURRENT USE OF INSULIN: Primary | ICD-10-CM

## 2024-05-14 DIAGNOSIS — Z79.4 TYPE 2 DIABETES MELLITUS WITH DIABETIC POLYNEUROPATHY, WITH LONG-TERM CURRENT USE OF INSULIN: ICD-10-CM

## 2024-05-14 DIAGNOSIS — K21.9 GASTROESOPHAGEAL REFLUX DISEASE, UNSPECIFIED WHETHER ESOPHAGITIS PRESENT: ICD-10-CM

## 2024-05-14 LAB
ALBUMIN SERPL BCP-MCNC: 3.7 G/DL (ref 3.5–5)
ALBUMIN/GLOB SERPL: 1 {RATIO}
ALP SERPL-CCNC: 79 U/L (ref 45–115)
ALT SERPL W P-5'-P-CCNC: 43 U/L (ref 16–61)
ANION GAP SERPL CALCULATED.3IONS-SCNC: 12 MMOL/L (ref 7–16)
AST SERPL W P-5'-P-CCNC: 32 U/L (ref 15–37)
BASOPHILS # BLD AUTO: 0.02 K/UL (ref 0–0.2)
BASOPHILS NFR BLD AUTO: 0.3 % (ref 0–1)
BILIRUB SERPL-MCNC: 1.4 MG/DL (ref ?–1.2)
BUN SERPL-MCNC: 12 MG/DL (ref 7–18)
BUN/CREAT SERPL: 14 (ref 6–20)
CALCIUM SERPL-MCNC: 8.7 MG/DL (ref 8.5–10.1)
CHLORIDE SERPL-SCNC: 104 MMOL/L (ref 98–107)
CHOLEST SERPL-MCNC: 111 MG/DL (ref 0–200)
CHOLEST/HDLC SERPL: 3.5 {RATIO}
CO2 SERPL-SCNC: 26 MMOL/L (ref 21–32)
CREAT SERPL-MCNC: 0.87 MG/DL (ref 0.7–1.3)
DIFFERENTIAL METHOD BLD: ABNORMAL
EGFR (NO RACE VARIABLE) (RUSH/TITUS): 101 ML/MIN/1.73M2
EOSINOPHIL # BLD AUTO: 0.06 K/UL (ref 0–0.5)
EOSINOPHIL NFR BLD AUTO: 1 % (ref 1–4)
ERYTHROCYTE [DISTWIDTH] IN BLOOD BY AUTOMATED COUNT: 13.2 % (ref 11.5–14.5)
EST. AVERAGE GLUCOSE BLD GHB EST-MCNC: 240 MG/DL
GLOBULIN SER-MCNC: 3.8 G/DL (ref 2–4)
GLUCOSE SERPL-MCNC: 229 MG/DL (ref 74–106)
HBA1C MFR BLD HPLC: 10 % (ref 4.5–6.6)
HCT VFR BLD AUTO: 48.5 % (ref 40–54)
HDLC SERPL-MCNC: 32 MG/DL (ref 40–60)
HGB BLD-MCNC: 15.6 G/DL (ref 13.5–18)
IMM GRANULOCYTES # BLD AUTO: 0.02 K/UL (ref 0–0.04)
IMM GRANULOCYTES NFR BLD: 0.3 % (ref 0–0.4)
LDLC SERPL CALC-MCNC: 59 MG/DL
LDLC/HDLC SERPL: 1.8 {RATIO}
LYMPHOCYTES # BLD AUTO: 1.58 K/UL (ref 1–4.8)
LYMPHOCYTES NFR BLD AUTO: 25.9 % (ref 27–41)
MAGNESIUM SERPL-MCNC: 2.5 MG/DL (ref 1.7–2.3)
MCH RBC QN AUTO: 28.8 PG (ref 27–31)
MCHC RBC AUTO-ENTMCNC: 32.2 G/DL (ref 32–36)
MCV RBC AUTO: 89.6 FL (ref 80–96)
MONOCYTES # BLD AUTO: 0.42 K/UL (ref 0–0.8)
MONOCYTES NFR BLD AUTO: 6.9 % (ref 2–6)
MPC BLD CALC-MCNC: 13 FL (ref 9.4–12.4)
NEUTROPHILS # BLD AUTO: 4 K/UL (ref 1.8–7.7)
NEUTROPHILS NFR BLD AUTO: 65.6 % (ref 53–65)
NONHDLC SERPL-MCNC: 79 MG/DL
NRBC # BLD AUTO: 0 X10E3/UL
NRBC, AUTO (.00): 0 %
PLATELET # BLD AUTO: 141 K/UL (ref 150–400)
POTASSIUM SERPL-SCNC: 3.8 MMOL/L (ref 3.5–5.1)
PROT SERPL-MCNC: 7.5 G/DL (ref 6.4–8.2)
RBC # BLD AUTO: 5.41 M/UL (ref 4.6–6.2)
SODIUM SERPL-SCNC: 138 MMOL/L (ref 136–145)
TRIGL SERPL-MCNC: 100 MG/DL (ref 35–150)
TSH SERPL DL<=0.005 MIU/L-ACNC: 9.1 UIU/ML (ref 0.36–3.74)
VLDLC SERPL-MCNC: 20 MG/DL
WBC # BLD AUTO: 6.1 K/UL (ref 4.5–11)

## 2024-05-14 PROCEDURE — 83036 HEMOGLOBIN GLYCOSYLATED A1C: CPT | Mod: ,,, | Performed by: CLINICAL MEDICAL LABORATORY

## 2024-05-14 PROCEDURE — 99214 OFFICE O/P EST MOD 30 MIN: CPT | Mod: ,,,

## 2024-05-14 PROCEDURE — 83735 ASSAY OF MAGNESIUM: CPT | Mod: ,,, | Performed by: CLINICAL MEDICAL LABORATORY

## 2024-05-14 PROCEDURE — 80061 LIPID PANEL: CPT | Mod: ,,, | Performed by: CLINICAL MEDICAL LABORATORY

## 2024-05-14 PROCEDURE — 80050 GENERAL HEALTH PANEL: CPT | Mod: ,,, | Performed by: CLINICAL MEDICAL LABORATORY

## 2024-05-14 RX ORDER — MAGNESIUM OXIDE 420 MG/1
1 TABLET ORAL DAILY
Qty: 90 EACH | Refills: 1 | Status: SHIPPED | OUTPATIENT
Start: 2024-05-14

## 2024-05-14 RX ORDER — ISOPROPYL ALCOHOL 70 ML/100ML
1 SWAB TOPICAL 3 TIMES DAILY
Qty: 300 EACH | Refills: 1 | Status: SHIPPED | OUTPATIENT
Start: 2024-05-14

## 2024-05-14 RX ORDER — VIT C/E/ZN/COPPR/LUTEIN/ZEAXAN 250MG-90MG
1000 CAPSULE ORAL DAILY
Qty: 90 CAPSULE | Refills: 1 | Status: SHIPPED | OUTPATIENT
Start: 2024-05-14

## 2024-05-14 RX ORDER — AMLODIPINE BESYLATE 5 MG/1
5 TABLET ORAL DAILY
Qty: 90 TABLET | Refills: 1 | Status: SHIPPED | OUTPATIENT
Start: 2024-05-14

## 2024-05-14 RX ORDER — INSULIN ASPART 100 [IU]/ML
INJECTION, SOLUTION INTRAVENOUS; SUBCUTANEOUS
Status: CANCELLED | OUTPATIENT
Start: 2024-05-14

## 2024-05-14 RX ORDER — LANCETS 28 GAUGE
EACH MISCELLANEOUS
Qty: 100 EACH | Refills: 1 | Status: SHIPPED | OUTPATIENT
Start: 2024-05-14

## 2024-05-14 RX ORDER — OMEGA-3-ACID ETHYL ESTERS 1 G/1
2 CAPSULE, LIQUID FILLED ORAL 2 TIMES DAILY
Qty: 360 CAPSULE | Refills: 1 | Status: SHIPPED | OUTPATIENT
Start: 2024-05-14

## 2024-05-14 RX ORDER — LEVOTHYROXINE SODIUM 200 UG/1
200 TABLET ORAL
Qty: 90 TABLET | Refills: 1 | Status: SHIPPED | OUTPATIENT
Start: 2024-05-14

## 2024-05-14 RX ORDER — PEN NEEDLE, DIABETIC 30 GX3/16"
NEEDLE, DISPOSABLE MISCELLANEOUS
Qty: 90 EACH | Refills: 1 | Status: SHIPPED | OUTPATIENT
Start: 2024-05-14

## 2024-05-14 RX ORDER — LISINOPRIL 20 MG/1
20 TABLET ORAL DAILY
Qty: 90 TABLET | Refills: 1 | Status: SHIPPED | OUTPATIENT
Start: 2024-05-14

## 2024-05-14 RX ORDER — INSULIN GLARGINE 100 [IU]/ML
60 INJECTION, SOLUTION SUBCUTANEOUS NIGHTLY
Qty: 54 ML | Refills: 0 | Status: SHIPPED | OUTPATIENT
Start: 2024-05-14

## 2024-05-14 RX ORDER — LIFITEGRAST 50 MG/ML
1 SOLUTION/ DROPS OPHTHALMIC 2 TIMES DAILY
COMMUNITY
Start: 2024-05-13

## 2024-05-14 RX ORDER — BLOOD-GLUCOSE METER
KIT MISCELLANEOUS
Qty: 100 STRIP | Refills: 1 | Status: SHIPPED | OUTPATIENT
Start: 2024-05-14

## 2024-05-14 RX ORDER — OMEPRAZOLE 20 MG/1
20 CAPSULE, DELAYED RELEASE ORAL DAILY
Qty: 90 CAPSULE | Refills: 1 | Status: SHIPPED | OUTPATIENT
Start: 2024-05-14

## 2024-05-14 RX ORDER — GLIMEPIRIDE 4 MG/1
TABLET ORAL
Qty: 180 TABLET | Refills: 1 | Status: SHIPPED | OUTPATIENT
Start: 2024-05-14

## 2024-05-14 RX ORDER — ATORVASTATIN CALCIUM 80 MG/1
80 TABLET, FILM COATED ORAL DAILY
Qty: 90 TABLET | Refills: 1 | Status: SHIPPED | OUTPATIENT
Start: 2024-05-14

## 2024-05-14 NOTE — PROGRESS NOTES
Population Health Chart Review & Patient Outreach Details      Further Action Needed If Patient Returns Outreach:      24 OSMAN sent to [facility] for recent diabetic eye exam for health maintenance TC,Lpn-CCC      Updates Requested / Reviewed:     [x]  Care Everywhere    [x]     []  External Sources ([facility], [facility], [facility], etc.)    [] [facility]   [] [facility]   [] Other:    [x]  Care Team Updated   []  Removed  or Duplicate Orders   []  Immunization Reconciliation Completed / Queried    [] [place]   [] [place]   [] [place]   [] [place]      Health Maintenance Topics Addressed and Outreach Outcomes / Actions Taken:             Breast Cancer Screening []  Mammogram Order Placed    []  Mammogram Screening Scheduled    []  External Records Requested & Care Team Updated if Applicable    []  External Records Uploaded & Care Team Updated if Applicable    []  Pt Declined Scheduling Mammogram    []  Pt Will Schedule with External Provider / Order Routed & Care Team Updated if Applicable              Cervical Cancer Screening []  Pap Smear Scheduled in Primary Care or OBGYN    []  External Records Requested & Care Team Updated if Applicable       []  External Records Uploaded, Care Team Updated, & History Updated if Applicable    []  Patient Declined Scheduling Pap Smear    []  Patient Will Schedule with External Provider & Care Team Updated if Applicable                  Colorectal Cancer Screening []  Colonoscopy Case Request / Referral / Home Test Order Placed    []  External Records Requested & Care Team Updated if Applicable    []  External Records Uploaded, Care Team Updated, & History Updated if Applicable    []  Patient Declined Completing Colon Cancer Screening    []  Patient Will Schedule with External Provider & Care Team Updated if Applicable    []  Fit Kit Mailed (add the SmartPhrase under additional notes)    []  Reminded Patient to Complete Home Test                Diabetic Eye Exam  []  Eye Exam Screening Order Placed    []  Eye Camera Scheduled or Optometry/Ophthalmology Referral Placed    [x]  External Records Requested & Care Team Updated if Applicable    []  External Records Uploaded, Care Team Updated, & History Updated if Applicable    []  Patient Declined Scheduling Eye Exam    []  Patient Will Schedule with External Provider & Care Team Updated if Applicable             Blood Pressure Control []  Primary Care Follow Up Visit Scheduled     []  Remote Blood Pressure Reading Captured    []  Patient Declined Remote Reading or Scheduling Appt - Escalated to PCP    []  Patient Will Call Back or Send Portal Message with Reading                 HbA1c & Other Labs []  Overdue Lab(s) Ordered    []  Overdue Lab(s) Scheduled    []  External Records Uploaded & Care Team Updated if Applicable    []  Primary Care Follow Up Visit Scheduled     []  Reminded Patient to Complete A1c Home Test    []  Patient Declined Scheduling Labs or Will Call Back to Schedule    []  Patient Will Schedule with External Provider / Order Routed, & Care Team Updated if Applicable           Primary Care Appointment []  Primary Care Appt Scheduled    []  Patient Declined Scheduling or Will Call Back to Schedule    []  Pt Established with External Provider, Updated Care Team, & Informed Pt to Notify Payor if Applicable           Medication Adherence /    Statin Use []  Primary Care Appointment Scheduled    []  Patient Reminded to  Prescription    []  Patient Declined, Provider Notified if Needed    []  Sent Provider Message to Review to Evaluate Pt for Statin, Add Exclusion Dx Codes, Document   Exclusion in Problem List, Change Statin Intensity Level to Moderate or High Intensity if Applicable                Osteoporosis Screening []  Dexa Order Placed    []  Dexa Appointment Scheduled    []  External Records Requested & Care Team Updated    []  External Records Uploaded, Care Team Updated, & History Updated if  Applicable    []  Patient Declined Scheduling Dexa or Will Call Back to Schedule    []  Patient Will Schedule with External Provider / Order Routed & Care Team Updated if Applicable       Additional Notes:

## 2024-05-14 NOTE — PROGRESS NOTES
VON ALSTON, ANDREA   [facility]  [address]  [place]  [address]      PATIENT NAME: James Mcclellan  : 1965  DATE: 24  MRN: [id number]        Reason for Visit / Chief Complaint: Hypertension (James Mcclellan 58 year old white male presents for a 3 month follow up, labs and medication refills and a referral.), Dry Eye (Patient reports he needs a referral to an eye specialist for constant dry eyes that  or the [facility] covers. ([place] has been used already and he is unable to find an eye drop that is covered by his insurance)), Diabetes, Hyperlipidemia, and Health Maintenance (Colorectal Cancer Screening Never done Declined/TETANUS VACCINE due on 2018 Declined/Hemoglobin A1c due on 10/17/2023 ORDERED/Eye Exam completed records sent for/Lipid Panel due ordered/)         History of Present Illness / Problem Focused Workflow           Review of Systems     @Review of Systems   Constitutional:  Negative for chills, fatigue and fever.   HENT:  Negative for nasal congestion, ear discharge, ear pain, rhinorrhea, sinus pressure/congestion and sore throat.    Respiratory:  Negative for cough, chest tightness, shortness of breath, wheezing and stridor.    Cardiovascular:  Negative for palpitations and claudication.   Gastrointestinal:  Negative for abdominal pain, constipation, diarrhea, nausea, vomiting and reflux.   Genitourinary:  Negative for dysuria, flank pain, frequency, hematuria and urgency.   Musculoskeletal:  Negative for myalgias.   Neurological:  Negative for dizziness, weakness, light-headedness and headaches.   Psychiatric/Behavioral:  Negative for suicidal ideas.        Medical / Social / Family History     Past Medical History:   Diagnosis Date    Bell's palsy 10/15/2018    Left Side of Face    Degenerative cervical disc     GERD (gastroesophageal reflux disease)     History of COVID-19 2021    Hypertension     Hypothyroidism     Mild nonproliferative diabetic  retinopathy of both eyes without macular edema associated with type 2 diabetes mellitus 01/30/2023    Neck pain     Regular astigmatism of both eyes     From eye exam on 01/30/2023    Type 2 diabetes mellitus        Past Surgical History:   Procedure Laterality Date    ANTERIOR CRUCIATE LIGAMENT REPAIR      CHOLECYSTECTOMY      SINUS SURGERY             Medications and Allergies     Medications  Outpatient Medications Marked as Taking for the 5/14/24 encounter (Office Visit) with Eliecer Graham FNP   Medication Sig Dispense Refill    aspirin 81 MG Chew Take 1 tablet (81 mg total) by mouth once daily. 90 tablet 1    gabapentin (NEURONTIN) 300 MG capsule Take 2 capsules (600 mg total) by mouth 3 (three) times daily. 270 capsule 1    neomycin-polymyxin-dexamethasone (DEXACINE) 3.5 mg/g-10,000 unit/g-0.1 % Oint       NOVOLOG FLEXPEN U-100 INSULIN 100 unit/mL (3 mL) InPn pen Inject into the skin.      XIIDRA 5 % Dpet Place 1 drop into both eyes 2 (two) times daily.      [DISCONTINUED] alcohol swabs (ALCOHOL PREP PADS) PadM Apply 1 each topically 3 (three) times daily. 300 each 1    [DISCONTINUED] amLODIPine (NORVASC) 5 MG tablet Take 1 tablet (5 mg total) by mouth once daily. 90 tablet 1    [DISCONTINUED] atorvastatin (LIPITOR) 80 MG tablet Take 1 tablet (80 mg total) by mouth once daily. 90 tablet 1    [DISCONTINUED] cholecalciferol, vitamin D3, (VITAMIN D3) 25 mcg (1,000 unit) capsule Take 1 capsule (1,000 Units total) by mouth once daily. 90 capsule 1    [DISCONTINUED] empagliflozin (JARDIANCE) 25 mg tablet Take 1 tablet (25 mg total) by mouth once daily. 90 tablet 1    [DISCONTINUED] FREESTYLE LANCETS 28 gauge lancets Use to check blood glucose once daily. 100 each 1    [DISCONTINUED] FREESTYLE LITE STRIPS Strp Use to check blood glucose once daily 100 strip 1    [DISCONTINUED] glimepiride (AMARYL) 4 MG tablet TAKE 2 TABLETS DAILY WITH BREAKFAST 180 tablet 1    [DISCONTINUED] insulin (LANTUS SOLOSTAR U-100 INSULIN)  "glargine 100 units/mL SubQ pen Inject 60 Units into the skin every evening. 54 mL 0    [DISCONTINUED] levothyroxine (SYNTHROID) 200 MCG tablet Take 1 tablet (200 mcg total) by mouth before breakfast. 90 tablet 1    [DISCONTINUED] lisinopriL (PRINIVIL,ZESTRIL) 20 MG tablet Take 1 tablet (20 mg total) by mouth once daily. 90 tablet 1    [DISCONTINUED] magnesium oxide 420 mg Tab Take 1 tablet by mouth once daily. 90 each 1    [DISCONTINUED] omega-3 acid ethyl esters (LOVAZA) 1 gram capsule Take 2 capsules (2 g total) by mouth 2 (two) times daily. 360 capsule 1    [DISCONTINUED] omeprazole (PRILOSEC) 20 MG capsule Take 1 capsule (20 mg total) by mouth once daily. 90 capsule 1    [DISCONTINUED] pen needle, diabetic 31 gauge x 3/16" Ndle Use to administer insulin once daily 90 each 1       Allergies  Review of patient's allergies indicates:  No Known Allergies    Physical Examination     Vitals:    05/14/24 0922   BP: 119/77   Pulse: 69   Resp: 17   Temp: 97.5 °F (36.4 °C)     Physical Exam  Vitals and nursing note reviewed.   Constitutional:       General: He is awake.      Appearance: Normal appearance.   HENT:      Head: Normocephalic.      Right Ear: Tympanic membrane, ear canal and external ear normal.      Left Ear: Tympanic membrane, ear canal and external ear normal.      Nose: Nose normal.      Mouth/Throat:      Lips: Pink.      Mouth: Mucous membranes are moist.      Pharynx: Oropharynx is clear. Uvula midline.   Cardiovascular:      Rate and Rhythm: Normal rate and regular rhythm.      Heart sounds: Normal heart sounds, S1 normal and S2 normal.   Pulmonary:      Effort: Pulmonary effort is normal. No respiratory distress.      Breath sounds: Normal breath sounds. No decreased breath sounds, wheezing, rhonchi or rales.   Abdominal:      General: Bowel sounds are normal.      Palpations: Abdomen is soft.      Tenderness: There is no abdominal tenderness.   Musculoskeletal:      Cervical back: Normal range of " motion.   Skin:     General: Skin is warm.      Capillary Refill: Capillary refill takes less than 2 seconds.   Neurological:      Mental Status: He is alert and oriented to person, place, and time.   Psychiatric:         Thought Content: Thought content does not include homicidal or suicidal ideation. Thought content does not include homicidal or suicidal plan.               Procedures   Assessment and Plan (including Health Maintenance)   :    Plan:     Problem List Items Addressed This Visit          Ophtho    Dry eye    Current Assessment & Plan     Referral made. Will call pt with appt time date         Relevant Orders    Ambulatory referral/consult to Optometry       Cardiac/Vascular    Essential hypertension, benign    Current Assessment & Plan     Monitor BP daily and keep BP daily log to bring to next visit. Exercise at least 5 times a week. Keep scheduled appts. RTC sooner if BP is staying <120/70. Dash diet.    DASH- includes foods rich in K+, calcium and Magnesium.The diet limits foods high in sodium, saturated fats and added sugars. This is a flexible balanced eating plan that helps create heart healthy eating style for life. Diet is rich in vegetable, whole grains and fruits. It includes fat free or low fat dairy products, fish, poultry, nuts. Chose foods high in K+, calcium, magnesium, fiber, protein, and low in saturated fats and sodium.          Relevant Medications    amLODIPine (NORVASC) 5 MG tablet    lisinopriL (PRINIVIL,ZESTRIL) 20 MG tablet    magnesium oxide 420 mg Tab    Other Relevant Orders    TSH    CBC Auto Differential    Magnesium    Comprehensive Metabolic Panel    Hyperlipidemia associated with type 2 diabetes mellitus    Current Assessment & Plan     Lipid panel obtained at today's visit. Goal LDL is less than 70. Continue current meds and low fat/low cholesterol diet with increased exercise as tolerated. Will follow up with labs. Patient to follow up in 3 months or as needed    A  "few changes in your diet can reduce cholesterol and improve your heart health. Saturated fats, found primarily in red meat and full-fat dairy products, raise your total cholesterol. Decreasing your consumption of saturated fats can reduce your low-density lipoprotein (LDL) cholesterol. rans fats, sometimes listed on food labels as "partially hydrogenated vegetable oil," are often used in margarines and store-bought cookies, crackers and cakes.     Trans fats raise overall cholesterol levels. Omega-3 fatty acids don't affect LDL cholesterol. But they have other heart-healthy benefits, including reducing blood pressure. Foods with omega-3 fatty acids include salmon, mackerel, herring, walnuts and flaxseeds.Soluble fiber can reduce the absorption of cholesterol into your bloodstream. Soluble fiber is found in such foods as oatmeal, kidney beans, [place] sprouts, apples and pears.  at least 30 minutes of exercise five times a week or vigorous aerobic activity for 20 minutes three times a week if tolerated.           Relevant Medications    atorvastatin (LIPITOR) 80 MG tablet    glimepiride (AMARYL) 4 MG tablet    insulin glargine U-100, Lantus, (LANTUS SOLOSTAR U-100 INSULIN) 100 unit/mL (3 mL) InPn pen    omega-3 acid ethyl esters (LOVAZA) 1 gram capsule    Other Relevant Orders    Lipid Panel       Endocrine    Type 2 diabetes mellitus - Primary    Current Assessment & Plan     HgbA1C obtained at today's visit. Goal is less than 7. Continue current meds and low carb/no concentrated sweets diet with increased exercise as tolerated. Will follow up with lab results. Patient to follow up in 3 months or as needed.          Relevant Medications    alcohol swabs (ALCOHOL PREP PADS) PadM    empagliflozin (JARDIANCE) 25 mg tablet    FREESTYLE LANCETS 28 gauge lancets    FREESTYLE LITE STRIPS Strp    glimepiride (AMARYL) 4 MG tablet    insulin glargine U-100, Lantus, (LANTUS SOLOSTAR U-100 INSULIN) 100 unit/mL (3 mL) InPn pen " "   pen needle, diabetic 31 gauge x 3/16" Ndle    Hypothyroidism    Overview     TSH ordered; continue current medication. take medication on empty stomach         Current Assessment & Plan     Take medication with a glass of water on an empty stomach daily, wait 30 minutes before consuming anything else. Separate from vitamins, iron  by 6 hours          Relevant Medications    levothyroxine (SYNTHROID) 200 MCG tablet    Vitamin D deficiency    Current Assessment & Plan     Lab work today and will call with appt date and time         Relevant Medications    cholecalciferol, vitamin D3, (VITAMIN D3) 25 mcg (1,000 unit) capsule       GI    Gastroesophageal reflux disease    Current Assessment & Plan      Reviewed pathology of current symptoms, medication side effects/risk/benefits/directions on taking medications, and to take medication as prescribed. Take medication as prescribed. Remain upright for at least one hour after eating or drinking, raise HOB 6-8 inches. Eat small, frequent bland meals instead of large meals. Avoid greasy/spicy/acidic foods. Avoid alcohol and NSAIDS. Monitor for changes in bowel color, texture, etc. Discussed what warrants emergent follow-up or treatment. Patient is to go to ED if they begin vomiting and it looks like blood or coffee grounds. Weight loss may help people who are overweight to reduce acid reflux. Weight loss has a number of other health benefits including decreases risk of heart disease and type 2 DM.         Relevant Medications    omeprazole (PRILOSEC) 20 MG capsule     Other Visit Diagnoses       Diabetes mellitus case management patient        Relevant Orders    Hemoglobin A1C            Health Maintenance Topics with due status: Not Due       Topic Last Completion Date    Foot Exam 07/17/2023    Diabetes Urine Screening 01/30/2024    Low Dose Statin 05/14/2024       Future Appointments   Date Time Provider Department Center   8/13/2024  9:20 AM Eliecer Graham FNP [facility] " [facility] [place]        Health Maintenance Due   Topic Date Due    Colorectal Cancer Screening  Never done    TETANUS VACCINE  08/26/2018    Hemoglobin A1c  10/17/2023    Eye Exam  01/30/2024    Lipid Panel  03/20/2024        Follow up in about 3 months (around 8/14/2024).     Signature:  ANDREA CARRANZA  [facility]  [address] [place]  [address]    Date of encounter: 5/14/24

## 2024-05-14 NOTE — ASSESSMENT & PLAN NOTE
"Lipid panel obtained at today's visit. Goal LDL is less than 70. Continue current meds and low fat/low cholesterol diet with increased exercise as tolerated. Will follow up with labs. Patient to follow up in 3 months or as needed    A few changes in your diet can reduce cholesterol and improve your heart health. Saturated fats, found primarily in red meat and full-fat dairy products, raise your total cholesterol. Decreasing your consumption of saturated fats can reduce your low-density lipoprotein (LDL) cholesterol. rans fats, sometimes listed on food labels as "partially hydrogenated vegetable oil," are often used in margarines and store-bought cookies, crackers and cakes.     Trans fats raise overall cholesterol levels. Omega-3 fatty acids don't affect LDL cholesterol. But they have other heart-healthy benefits, including reducing blood pressure. Foods with omega-3 fatty acids include salmon, mackerel, herring, walnuts and flaxseeds.Soluble fiber can reduce the absorption of cholesterol into your bloodstream. Soluble fiber is found in such foods as oatmeal, kidney beans, [place] sprouts, apples and pears.  at least 30 minutes of exercise five times a week or vigorous aerobic activity for 20 minutes three times a week if tolerated.    "

## 2024-05-14 NOTE — LETTER
AUTHORIZATION FOR RELEASE OF   CONFIDENTIAL INFORMATION    Dear Rishabh [facility],    We are seeing James Mcclellan, date of birth 1965, in the clinic at [facility]. Jessika De Leon NP is the patient's PCP. James Mcclellan has an outstanding lab/procedure at the time we reviewed his chart. In order to help keep his health information updated, he has authorized us to request the following medical record(s):        (  )  MAMMOGRAM                                      (  )  COLONOSCOPY      (  )  PAP SMEAR                                          (  )  OUTSIDE LAB RESULTS     (  )  DEXA SCAN                                          ( XX )  EYE EXAM            (  )  FOOT EXAM                                          (  )  ENTIRE RECORD     (  )  OUTSIDE IMMUNIZATIONS                 (  )  _______________         Please fax records to [facility], Peterson, Kristin, NP, [phone]     If you have any questions, please contact Carmen Roman at [phone].           Patient Name: James Mcclellan  : 1965  Patient Phone #: [phone]

## 2024-05-17 DIAGNOSIS — E03.9 HYPOTHYROIDISM, UNSPECIFIED TYPE: ICD-10-CM

## 2024-05-17 DIAGNOSIS — E11.42 TYPE 2 DIABETES MELLITUS WITH DIABETIC POLYNEUROPATHY, WITH LONG-TERM CURRENT USE OF INSULIN: Primary | ICD-10-CM

## 2024-05-17 DIAGNOSIS — E11.69 HYPERLIPIDEMIA ASSOCIATED WITH TYPE 2 DIABETES MELLITUS: ICD-10-CM

## 2024-05-17 DIAGNOSIS — E78.5 HYPERLIPIDEMIA ASSOCIATED WITH TYPE 2 DIABETES MELLITUS: ICD-10-CM

## 2024-05-17 DIAGNOSIS — Z79.4 TYPE 2 DIABETES MELLITUS WITH DIABETIC POLYNEUROPATHY, WITH LONG-TERM CURRENT USE OF INSULIN: Primary | ICD-10-CM

## 2024-05-29 ENCOUNTER — PATIENT OUTREACH (OUTPATIENT)
Facility: HOSPITAL | Age: 59
End: 2024-05-29
Payer: OTHER GOVERNMENT

## 2024-07-25 ENCOUNTER — TELEPHONE (OUTPATIENT)
Dept: ORTHOPEDICS | Facility: CLINIC | Age: 59
End: 2024-07-25
Payer: OTHER GOVERNMENT

## 2024-07-25 NOTE — TELEPHONE ENCOUNTER
NEW REQUEST FOR SERVICE FORM [facility]OT [facility]; WILL REACH OUT TO SCHEDULE F/U APPT AS SOON AS WE HAVE THAT AUTHORIZATION     ----- Message from Irma Saavedra sent at 2024 12:51 PM CDT -----  Who Called: Marly (wife)    Caller is requesting assistance/information from provider's office.          Preferred Method of Contact: Phone Call  Patient's Preferred Phone Number on File: [phone]   Best Call Back Number, if different: call back # [phone]  Additional Information: pt's wife calling to check to see if [facility] referral is still in date -  2024 - need providers office to send a RFS form to the [facility] to request an updated referral

## 2024-08-27 ENCOUNTER — OFFICE VISIT (OUTPATIENT)
Dept: FAMILY MEDICINE | Facility: CLINIC | Age: 59
End: 2024-08-27
Payer: OTHER GOVERNMENT

## 2024-08-27 VITALS
OXYGEN SATURATION: 95 % | RESPIRATION RATE: 19 BRPM | DIASTOLIC BLOOD PRESSURE: 73 MMHG | TEMPERATURE: 98 F | SYSTOLIC BLOOD PRESSURE: 114 MMHG | HEART RATE: 77 BPM | BODY MASS INDEX: 32.88 KG/M2 | HEIGHT: 69 IN | WEIGHT: 222 LBS

## 2024-08-27 DIAGNOSIS — H04.129 DRY EYE: Primary | ICD-10-CM

## 2024-08-27 DIAGNOSIS — K21.9 GASTROESOPHAGEAL REFLUX DISEASE, UNSPECIFIED WHETHER ESOPHAGITIS PRESENT: ICD-10-CM

## 2024-08-27 DIAGNOSIS — E78.5 HYPERLIPIDEMIA ASSOCIATED WITH TYPE 2 DIABETES MELLITUS: ICD-10-CM

## 2024-08-27 DIAGNOSIS — E03.9 HYPOTHYROIDISM, UNSPECIFIED TYPE: ICD-10-CM

## 2024-08-27 DIAGNOSIS — E11.42 TYPE 2 DIABETES MELLITUS WITH DIABETIC POLYNEUROPATHY, WITH LONG-TERM CURRENT USE OF INSULIN: ICD-10-CM

## 2024-08-27 DIAGNOSIS — M79.641 RIGHT HAND PAIN: ICD-10-CM

## 2024-08-27 DIAGNOSIS — E11.69 HYPERLIPIDEMIA ASSOCIATED WITH TYPE 2 DIABETES MELLITUS: ICD-10-CM

## 2024-08-27 DIAGNOSIS — Z79.4 TYPE 2 DIABETES MELLITUS WITH DIABETIC POLYNEUROPATHY, WITH LONG-TERM CURRENT USE OF INSULIN: ICD-10-CM

## 2024-08-27 DIAGNOSIS — E55.9 VITAMIN D DEFICIENCY: ICD-10-CM

## 2024-08-27 DIAGNOSIS — I10 ESSENTIAL HYPERTENSION, BENIGN: ICD-10-CM

## 2024-08-27 DIAGNOSIS — M54.9 BACK PAIN, UNSPECIFIED BACK LOCATION, UNSPECIFIED BACK PAIN LATERALITY, UNSPECIFIED CHRONICITY: Primary | ICD-10-CM

## 2024-08-27 DIAGNOSIS — E11.42 DIABETIC POLYNEUROPATHY ASSOCIATED WITH TYPE 2 DIABETES MELLITUS: ICD-10-CM

## 2024-08-27 LAB
ALBUMIN SERPL BCP-MCNC: 3.7 G/DL (ref 3.5–5)
ALBUMIN/GLOB SERPL: 1 {RATIO}
ALP SERPL-CCNC: 76 U/L (ref 45–115)
ALT SERPL W P-5'-P-CCNC: 51 U/L (ref 16–61)
ANION GAP SERPL CALCULATED.3IONS-SCNC: 10 MMOL/L (ref 7–16)
AST SERPL W P-5'-P-CCNC: 38 U/L (ref 15–37)
BASOPHILS # BLD AUTO: 0.04 K/UL (ref 0–0.2)
BASOPHILS NFR BLD AUTO: 0.7 % (ref 0–1)
BILIRUB SERPL-MCNC: 1.5 MG/DL (ref ?–1.2)
BUN SERPL-MCNC: 13 MG/DL (ref 7–18)
BUN/CREAT SERPL: 15 (ref 6–20)
CALCIUM SERPL-MCNC: 8.9 MG/DL (ref 8.5–10.1)
CHLORIDE SERPL-SCNC: 103 MMOL/L (ref 98–107)
CO2 SERPL-SCNC: 30 MMOL/L (ref 21–32)
CREAT SERPL-MCNC: 0.88 MG/DL (ref 0.7–1.3)
DIFFERENTIAL METHOD BLD: ABNORMAL
EGFR (NO RACE VARIABLE) (RUSH/TITUS): 100 ML/MIN/1.73M2
EOSINOPHIL # BLD AUTO: 0.07 K/UL (ref 0–0.5)
EOSINOPHIL NFR BLD AUTO: 1.2 % (ref 1–4)
ERYTHROCYTE [DISTWIDTH] IN BLOOD BY AUTOMATED COUNT: 13.5 % (ref 11.5–14.5)
EST. AVERAGE GLUCOSE BLD GHB EST-MCNC: 260 MG/DL
GLOBULIN SER-MCNC: 3.8 G/DL (ref 2–4)
GLUCOSE SERPL-MCNC: 316 MG/DL (ref 74–106)
HBA1C MFR BLD HPLC: 10.7 % (ref 4.5–6.6)
HCT VFR BLD AUTO: 47.9 % (ref 40–54)
HGB BLD-MCNC: 15.2 G/DL (ref 13.5–18)
IMM GRANULOCYTES # BLD AUTO: 0.01 K/UL (ref 0–0.04)
IMM GRANULOCYTES NFR BLD: 0.2 % (ref 0–0.4)
LYMPHOCYTES # BLD AUTO: 1.46 K/UL (ref 1–4.8)
LYMPHOCYTES NFR BLD AUTO: 25.2 % (ref 27–41)
MCH RBC QN AUTO: 29.2 PG (ref 27–31)
MCHC RBC AUTO-ENTMCNC: 31.7 G/DL (ref 32–36)
MCV RBC AUTO: 92.1 FL (ref 80–96)
MONOCYTES # BLD AUTO: 0.5 K/UL (ref 0–0.8)
MONOCYTES NFR BLD AUTO: 8.6 % (ref 2–6)
MPC BLD CALC-MCNC: 12.6 FL (ref 9.4–12.4)
NEUTROPHILS # BLD AUTO: 3.72 K/UL (ref 1.8–7.7)
NEUTROPHILS NFR BLD AUTO: 64.1 % (ref 53–65)
NRBC # BLD AUTO: 0 X10E3/UL
NRBC, AUTO (.00): 0 %
PLATELET # BLD AUTO: 122 K/UL (ref 150–400)
POTASSIUM SERPL-SCNC: 4 MMOL/L (ref 3.5–5.1)
PROT SERPL-MCNC: 7.5 G/DL (ref 6.4–8.2)
RBC # BLD AUTO: 5.2 M/UL (ref 4.6–6.2)
SODIUM SERPL-SCNC: 139 MMOL/L (ref 136–145)
TSH SERPL DL<=0.005 MIU/L-ACNC: 7.25 UIU/ML (ref 0.36–3.74)
WBC # BLD AUTO: 5.8 K/UL (ref 4.5–11)

## 2024-08-27 PROCEDURE — 80050 GENERAL HEALTH PANEL: CPT | Mod: ,,, | Performed by: CLINICAL MEDICAL LABORATORY

## 2024-08-27 PROCEDURE — 99214 OFFICE O/P EST MOD 30 MIN: CPT | Mod: ,,, | Performed by: NURSE PRACTITIONER

## 2024-08-27 PROCEDURE — 83036 HEMOGLOBIN GLYCOSYLATED A1C: CPT | Mod: ,,, | Performed by: CLINICAL MEDICAL LABORATORY

## 2024-08-27 RX ORDER — LANCETS 28 GAUGE
EACH MISCELLANEOUS
Qty: 100 EACH | Refills: 1 | Status: SHIPPED | OUTPATIENT
Start: 2024-08-27

## 2024-08-27 RX ORDER — INSULIN GLARGINE 100 [IU]/ML
60 INJECTION, SOLUTION SUBCUTANEOUS NIGHTLY
Qty: 54 ML | Refills: 0 | Status: SHIPPED | OUTPATIENT
Start: 2024-08-27

## 2024-08-27 RX ORDER — NAPROXEN SODIUM 220 MG/1
81 TABLET, FILM COATED ORAL DAILY
Qty: 90 TABLET | Refills: 1 | Status: SHIPPED | OUTPATIENT
Start: 2024-08-27

## 2024-08-27 RX ORDER — MAGNESIUM OXIDE 420 MG/1
1 TABLET ORAL DAILY
Qty: 90 EACH | Refills: 1 | Status: SHIPPED | OUTPATIENT
Start: 2024-08-27

## 2024-08-27 RX ORDER — ATORVASTATIN CALCIUM 80 MG/1
80 TABLET, FILM COATED ORAL DAILY
Qty: 90 TABLET | Refills: 1 | Status: SHIPPED | OUTPATIENT
Start: 2024-08-27

## 2024-08-27 RX ORDER — OMEPRAZOLE 20 MG/1
20 CAPSULE, DELAYED RELEASE ORAL DAILY
Qty: 90 CAPSULE | Refills: 1 | Status: SHIPPED | OUTPATIENT
Start: 2024-08-27

## 2024-08-27 RX ORDER — LIFITEGRAST 50 MG/ML
1 SOLUTION/ DROPS OPHTHALMIC 2 TIMES DAILY
Qty: 60 EACH | Refills: 0 | Status: SHIPPED | OUTPATIENT
Start: 2024-08-27

## 2024-08-27 RX ORDER — VIT C/E/ZN/COPPR/LUTEIN/ZEAXAN 250MG-90MG
1000 CAPSULE ORAL DAILY
Qty: 90 CAPSULE | Refills: 1 | Status: SHIPPED | OUTPATIENT
Start: 2024-08-27

## 2024-08-27 RX ORDER — GLIMEPIRIDE 4 MG/1
TABLET ORAL
Qty: 180 TABLET | Refills: 1 | Status: SHIPPED | OUTPATIENT
Start: 2024-08-27

## 2024-08-27 RX ORDER — OMEGA-3-ACID ETHYL ESTERS 1 G/1
2 CAPSULE, LIQUID FILLED ORAL 2 TIMES DAILY
Qty: 360 CAPSULE | Refills: 1 | Status: SHIPPED | OUTPATIENT
Start: 2024-08-27

## 2024-08-27 RX ORDER — LEVOTHYROXINE SODIUM 200 UG/1
200 TABLET ORAL
Qty: 90 TABLET | Refills: 1 | Status: SHIPPED | OUTPATIENT
Start: 2024-08-27

## 2024-08-27 RX ORDER — GABAPENTIN 300 MG/1
600 CAPSULE ORAL 3 TIMES DAILY
Qty: 270 CAPSULE | Refills: 1 | Status: SHIPPED | OUTPATIENT
Start: 2024-08-27

## 2024-08-27 RX ORDER — LISINOPRIL 20 MG/1
20 TABLET ORAL DAILY
Qty: 90 TABLET | Refills: 1 | Status: SHIPPED | OUTPATIENT
Start: 2024-08-27

## 2024-08-27 RX ORDER — AMLODIPINE BESYLATE 5 MG/1
5 TABLET ORAL DAILY
Qty: 90 TABLET | Refills: 1 | Status: SHIPPED | OUTPATIENT
Start: 2024-08-27

## 2024-08-27 RX ORDER — LANCETS 28 GAUGE
EACH MISCELLANEOUS
Qty: 100 EACH | Refills: 1 | Status: CANCELLED | OUTPATIENT
Start: 2024-08-27

## 2024-08-27 RX ORDER — BLOOD-GLUCOSE METER
KIT MISCELLANEOUS
Qty: 100 STRIP | Refills: 1 | Status: SHIPPED | OUTPATIENT
Start: 2024-08-27

## 2024-08-27 RX ORDER — PEN NEEDLE, DIABETIC 30 GX3/16"
NEEDLE, DISPOSABLE MISCELLANEOUS
Qty: 90 EACH | Refills: 1 | Status: SHIPPED | OUTPATIENT
Start: 2024-08-27

## 2024-08-27 NOTE — PROGRESS NOTES
Jessika De Leon NP   [facility]  [address]  [place]  [address]      PATIENT NAME: James Mcclellan  : 1965  DATE: 24  MRN: [id number]      Billing Provider: Jessika De Leon NP  Level of Service: PA OFFICE/OUTPT VISIT, EST, LEVL IV, 30-39 MIN  Patient PCP Information       Provider PCP Type    Jessika De Leon NP General            Reason for Visit / Chief Complaint: Back Pain (Patient has chronic back pain requesting referral for injection to back with Dr. Varela at [facility]), Hand Pain (Patient has chronic right hand pain, requesting referral for Dr. Omer Reese ), Referral (Patient need referral for endocrinology. ), and Follow-up (Patient here for 6 month follow up. He did have abnormal TSH and A1C at last visit. Lab ordered pended for recheck )         History of Present Illness / Problem Focused Workflow     58 year old male presents to clinic for 6 month check up and medication refill.   Back Pain: Patient has chronic back pain requesting referral for injection to back with Dr. Varela at [facility].   Hand Pain: Patient has chronic right hand pain, requesting referral for Dr. Omer Reese   Referral: Patient need referral for endocrinology for management of diabetes.   Patient complains of neck pain and states he feels tight through his trapezius.   He is also requesting prescription for diabetic shoes.   Needs refill on all meds.         Review of Systems     @Review of Systems   Constitutional:  Negative for activity change, appetite change, fatigue and fever.   HENT:  Negative for nasal congestion, ear pain, rhinorrhea, sinus pressure/congestion and sore throat.    Eyes:  Negative for pain, redness, visual disturbance and eye dryness.   Respiratory:  Negative for cough and shortness of breath.    Cardiovascular:  Negative for chest pain and leg swelling.   Gastrointestinal:  Negative for abdominal distention, abdominal pain, constipation and diarrhea.    Endocrine: Negative for cold intolerance, heat intolerance and polyuria.   Genitourinary:  Negative for bladder incontinence, dysuria, frequency and urgency.   Musculoskeletal:  Positive for back pain, myalgias and neck pain. Negative for arthralgias and gait problem.   Integumentary:  Negative for color change, rash and wound.   Allergic/Immunologic: Negative for environmental allergies and food allergies.   Neurological:  Negative for dizziness, weakness, light-headedness and headaches.   Psychiatric/Behavioral:  Negative for behavioral problems and sleep disturbance.        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 300 each 1    [DISCONTINUED] amLODIPine (NORVASC) 5 MG tablet Take 1 tablet (5 mg total) by mouth once daily. 90 tablet 1    [DISCONTINUED] aspirin 81 MG Chew Take 1 tablet (81 mg total) by mouth once daily. 90 tablet 1    [DISCONTINUED] atorvastatin (LIPITOR) 80 MG tablet Take 1 tablet (80 mg total) by mouth once daily. 90 tablet 1    [DISCONTINUED] cholecalciferol, vitamin D3, (VITAMIN D3) 25 mcg (1,000 unit) capsule Take 1  "capsule (1,000 Units total) by mouth once daily. 90 capsule 1    [DISCONTINUED] empagliflozin (JARDIANCE) 25 mg tablet Take 1 tablet (25 mg total) by mouth once daily. 90 tablet 1    [DISCONTINUED] FREESTYLE LANCETS 28 gauge lancets Use to check blood glucose once daily. 100 each 1    [DISCONTINUED] FREESTYLE LITE STRIPS Strp Use to check blood glucose once daily 100 strip 1    [DISCONTINUED] gabapentin (NEURONTIN) 300 MG capsule Take 2 capsules (600 mg total) by mouth 3 (three) times daily. 270 capsule 1    [DISCONTINUED] glimepiride (AMARYL) 4 MG tablet TAKE 2 TABLETS DAILY WITH BREAKFAST 180 tablet 1    [DISCONTINUED] insulin glargine U-100, Lantus, (LANTUS SOLOSTAR U-100 INSULIN) 100 unit/mL (3 mL) InPn pen Inject 60 Units into the skin every evening. 54 mL 0    [DISCONTINUED] levothyroxine (SYNTHROID) 200 MCG tablet Take 1 tablet (200 mcg total) by mouth before breakfast. 90 tablet 1    [DISCONTINUED] lisinopriL (PRINIVIL,ZESTRIL) 20 MG tablet Take 1 tablet (20 mg total) by mouth once daily. 90 tablet 1    [DISCONTINUED] magnesium oxide 420 mg Tab Take 1 tablet by mouth once daily. 90 each 1    [DISCONTINUED] neomycin-polymyxin-dexamethasone (DEXACINE) 3.5 mg/g-10,000 unit/g-0.1 % Oint       [DISCONTINUED] omega-3 acid ethyl esters (LOVAZA) 1 gram capsule Take 2 capsules (2 g total) by mouth 2 (two) times daily. 360 capsule 1    [DISCONTINUED] omeprazole (PRILOSEC) 20 MG capsule Take 1 capsule (20 mg total) by mouth once daily. 90 capsule 1    [DISCONTINUED] pen needle, diabetic 31 gauge x 3/16" Ndle Use to administer insulin once daily 90 each 1    [DISCONTINUED] XIIDRA 5 % Dpet Place 1 drop into both eyes 2 (two) times daily.         Allergies  Review of patient's allergies indicates:  No Known Allergies    Physical Examination     Vitals:    08/27/24 1019   BP: 114/73   Pulse: 77   Resp: 19   Temp: 97.6 °F (36.4 °C)     Physical Exam  Vitals and nursing note reviewed.   Constitutional:       Appearance: He " is obese.   HENT:      Head: Normocephalic.      Nose: Nose normal.      Mouth/Throat:      Mouth: Mucous membranes are moist.      Pharynx: Oropharynx is clear. No posterior oropharyngeal erythema.   Eyes:      Conjunctiva/sclera: Conjunctivae normal.   Cardiovascular:      Rate and Rhythm: Normal rate and regular rhythm.      Pulses:           Dorsalis pedis pulses are 1+ on the right side and 1+ on the left side.        Posterior tibial pulses are 1+ on the right side and 1+ on the left side.      Heart sounds: Normal heart sounds.   Pulmonary:      Effort: Pulmonary effort is normal.      Breath sounds: Normal breath sounds.   Abdominal:      General: Abdomen is flat. Bowel sounds are normal. There is no distension.      Palpations: Abdomen is soft.   Musculoskeletal:         General: No swelling.      Right hand: Bony tenderness present. Decreased range of motion.      Cervical back: Spasms, tenderness and bony tenderness present.      Lumbar back: Tenderness and bony tenderness present. Decreased range of motion.      Right lower leg: No edema.      Left lower leg: No edema.        Feet:    Feet:      Right foot:      Protective Sensation: 9 sites tested.  2 sites sensed.      Skin integrity: Callus and dry skin present.      Toenail Condition: Right toenails are normal.      Left foot:      Protective Sensation: 9 sites tested.  2 sites sensed.      Skin integrity: Callus and dry skin present.      Toenail Condition: Left toenails are normal.   Skin:     General: Skin is warm and dry.      Capillary Refill: Capillary refill takes less than 2 seconds.   Neurological:      Mental Status: He is alert. Mental status is at baseline.   Psychiatric:         Mood and Affect: Mood normal.         Behavior: Behavior normal.               Lab Results   Component Value Date    WBC 5.80 08/27/2024    HGB 15.2 08/27/2024    HCT 47.9 08/27/2024    MCV 92.1 08/27/2024     (L) 08/27/2024        CMP  Sodium   Date Value  Ref Range Status   08/27/2024 139 136 - 145 mmol/L Final     Potassium   Date Value Ref Range Status   08/27/2024 4.0 3.5 - 5.1 mmol/L Final     Chloride   Date Value Ref Range Status   08/27/2024 103 98 - 107 mmol/L Final     CO2   Date Value Ref Range Status   08/27/2024 30 21 - 32 mmol/L Final     Glucose   Date Value Ref Range Status   08/27/2024 316 (H) 74 - 106 mg/dL Final     BUN   Date Value Ref Range Status   08/27/2024 13 7 - 18 mg/dL Final     Creatinine   Date Value Ref Range Status   08/27/2024 0.88 0.70 - 1.30 mg/dL Final     Calcium   Date Value Ref Range Status   08/27/2024 8.9 8.5 - 10.1 mg/dL Final     Total Protein   Date Value Ref Range Status   08/27/2024 7.5 6.4 - 8.2 g/dL Final     Albumin   Date Value Ref Range Status   08/27/2024 3.7 3.5 - 5.0 g/dL Final     Bilirubin, Total   Date Value Ref Range Status   08/27/2024 1.5 (H) >0.0 - 1.2 mg/dL Final     Alk Phos   Date Value Ref Range Status   08/27/2024 76 45 - 115 U/L Final     AST   Date Value Ref Range Status   08/27/2024 38 (H) 15 - 37 U/L Final     ALT   Date Value Ref Range Status   08/27/2024 51 16 - 61 U/L Final     Anion Gap   Date Value Ref Range Status   08/27/2024 10 7 - 16 mmol/L Final     eGFR   Date Value Ref Range Status   08/27/2024 100 >=60 mL/min/1.73m2 Final     Procedures   Assessment and Plan (including Health Maintenance)   :    Plan:     Problem List Items Addressed This Visit          Neuro    Diabetic polyneuropathy associated with type 2 diabetes mellitus    Current Assessment & Plan     Foot exam performed today with noted loss of sensation. Encouraged tight control of glucose.   HgbA1C obtained at today's visit. Goal is less than 7. Continue current meds and low carb/no concentrated sweets diet with increased exercise as tolerated. Will follow up with lab results. Patient to follow up in 3 months or as needed.            Relevant Medications    empagliflozin (JARDIANCE) 25 mg tablet    gabapentin (NEURONTIN) 300  "MG capsule    glimepiride (AMARYL) 4 MG tablet    insulin glargine U-100, Lantus, (LANTUS SOLOSTAR U-100 INSULIN) 100 unit/mL (3 mL) InPn pen    pen needle, diabetic 31 gauge x 3/16" Ndle    FREESTYLE LANCETS 28 gauge lancets    FREESTYLE LITE STRIPS Strp       Cardiac/Vascular    Essential hypertension, benign    Current Assessment & Plan     Monitor BP daily and keep BP daily log to bring to next visit. Exercise at least 5 times a week. Keep scheduled appts. RTC sooner if BP is staying <120/70. Dash diet.    DASH- includes foods rich in K+, calcium and Magnesium.The diet limits foods high in sodium, saturated fats and added sugars. This is a flexible balanced eating plan that helps create heart healthy eating style for life. Diet is rich in vegetable, whole grains and fruits. It includes fat free or low fat dairy products, fish, poultry, nuts. Chose foods high in K+, calcium, magnesium, fiber, protein, and low in saturated fats and sodium.          Relevant Medications    amLODIPine (NORVASC) 5 MG tablet    aspirin 81 MG Chew    lisinopriL (PRINIVIL,ZESTRIL) 20 MG tablet    magnesium oxide 420 mg Tab    Other Relevant Orders    CBC Auto Differential (Completed)    Comprehensive Metabolic Panel (Completed)    Hyperlipidemia associated with type 2 diabetes mellitus    Current Assessment & Plan     Lipid panel obtained at today's visit. Goal LDL is less than 70. Continue current meds and low fat/low cholesterol diet with increased exercise as tolerated. Will follow up with labs. Patient to follow up in 3 months or as needed.            Relevant Medications    atorvastatin (LIPITOR) 80 MG tablet    glimepiride (AMARYL) 4 MG tablet    insulin glargine U-100, Lantus, (LANTUS SOLOSTAR U-100 INSULIN) 100 unit/mL (3 mL) InPn pen    omega-3 acid ethyl esters (LOVAZA) 1 gram capsule       Endocrine    Type 2 diabetes mellitus    Current Assessment & Plan     HgbA1C obtained at today's visit. Goal is less than 7. Continue " "current meds and low carb/no concentrated sweets diet with increased exercise as tolerated. Will follow up with lab results. Patient to follow up in 3 months or as needed.            Relevant Medications    empagliflozin (JARDIANCE) 25 mg tablet    gabapentin (NEURONTIN) 300 MG capsule    glimepiride (AMARYL) 4 MG tablet    insulin glargine U-100, Lantus, (LANTUS SOLOSTAR U-100 INSULIN) 100 unit/mL (3 mL) InPn pen    pen needle, diabetic 31 gauge x 3/16" Ndle    FREESTYLE LANCETS 28 gauge lancets    FREESTYLE LITE STRIPS Strp    Other Relevant Orders    Hemoglobin A1C (Completed)    Foot Exam Performed (Completed)    DIABETIC SHOES FOR HOME USE    Hypothyroidism    Overview     TSH ordered; continue current medication. take medication on empty stomach         Current Assessment & Plan     Clinically Euthyroid. TSH obtained today. Instructed we would call with results and make dose adjustments if necessary.  Follow-up in 3 months.           Relevant Medications    levothyroxine (SYNTHROID) 200 MCG tablet    Other Relevant Orders    TSH (Completed)    Vitamin D deficiency    Relevant Medications    cholecalciferol, vitamin D3, (VITAMIN D3) 25 mcg (1,000 unit) capsule       GI    Gastroesophageal reflux disease    Current Assessment & Plan     Well controlled on Omeprazole. Continue at current dosage. Follow up in 3 months or as needed.            Relevant Medications    omeprazole (PRILOSEC) 20 MG capsule       Orthopedic    Right hand pain    Current Assessment & Plan     Referral in place to see Dr Reese related to hand pain.           Other Visit Diagnoses       Back pain, unspecified back location, unspecified back pain laterality, unspecified chronicity    -  Primary    Relevant Orders    Ambulatory referral/consult to Pain Clinic            Health Maintenance Topics with due status: Not Due       Topic Last Completion Date    Diabetes Urine Screening 01/30/2024    Eye Exam 05/07/2024    Lipid Panel 05/14/2024    " Low Dose Statin 08/27/2024    Foot Exam 08/27/2024    Hemoglobin A1c 08/27/2024       Future Appointments   Date Time Provider Department Center   11/26/2024  9:40 AM Jessika De Leon NP [facility]        Health Maintenance Due   Topic Date Due    HIV Screening  Never done    Colorectal Cancer Screening  Never done    Pneumococcal Vaccines (Age 0-64) (2 of 2 - PCV) 11/03/2015    Shingles Vaccine (1 of 2) Never done    TETANUS VACCINE  08/26/2018          Signature:  Jessika De Leon NP  [address]  [address]    Date of encounter: 8/27/24

## 2024-08-27 NOTE — PROGRESS NOTES
Health Maintenance Due   Topic Date Due    HIV Screening  Never done    Colorectal Cancer Screening  Never done    Pneumococcal Vaccines (Age 0-64) (2 of 2 - PCV) 11/03/2015    Shingles Vaccine (1 of 2) Never done    TETANUS VACCINE  08/26/2018    Foot Exam  07/17/2024    Hemoglobin A1c  08/14/2024     Care gaps discussed. Patient declined all except Foot exam and A1C

## 2024-08-27 NOTE — TELEPHONE ENCOUNTER
[facility] called, Xiidra 5% not covered by insurance, she said restasis could be sent in instead.     I tried multiple times to get in touch with them, but I will try again.      Called pt, no answer, left message about Diane gtts, that [facility] reported insurance not covering, will need to go through his ophthalmologist or return our call if needed.

## 2024-08-28 ENCOUNTER — TELEPHONE (OUTPATIENT)
Dept: FAMILY MEDICINE | Facility: CLINIC | Age: 59
End: 2024-08-28
Payer: OTHER GOVERNMENT

## 2024-08-28 DIAGNOSIS — E11.42 TYPE 2 DIABETES MELLITUS WITH DIABETIC POLYNEUROPATHY, WITH LONG-TERM CURRENT USE OF INSULIN: Primary | ICD-10-CM

## 2024-08-28 DIAGNOSIS — Z79.4 TYPE 2 DIABETES MELLITUS WITH DIABETIC POLYNEUROPATHY, WITH LONG-TERM CURRENT USE OF INSULIN: Primary | ICD-10-CM

## 2024-08-28 RX ORDER — SEMAGLUTIDE 0.68 MG/ML
0.25 INJECTION, SOLUTION SUBCUTANEOUS
Qty: 3 ML | Refills: 1 | Status: SHIPPED | OUTPATIENT
Start: 2024-08-28 | End: 2024-08-29 | Stop reason: ALTCHOICE

## 2024-08-28 RX ORDER — LEVOTHYROXINE SODIUM 25 UG/1
25 TABLET ORAL
Qty: 30 TABLET | Refills: 11 | Status: SHIPPED | OUTPATIENT
Start: 2024-08-28 | End: 2025-08-28

## 2024-08-28 NOTE — PROGRESS NOTES
Labs reviewed: Hgb A1C 10.7 which is far too high. His glucose was 316 at time of lab draw. We have listed Jardiance 25mg dly, Amaryl 4mg dly, and Lantus 60 units nightly. However, he states he has only been taking Lantus 55 units nightly. We have got to work to get his A1C. He needs to increase Lantus to 60 units. Please ask him if he has ever tried to GLP1s such as Ozempic, Mounjaro, Trulicity? Or if he is willing to try these? We have made referral to Endocrinology as well. Work hard on low carb/no concentrated sweets diet and increase exercise as tolerated. TSH was very high at 7.250. Make sure he is taking his 200mcg of Levothyroxine as ordered, 30 mins before food or other meds. If he is taking as ordered we will need to increase dosage.

## 2024-08-28 NOTE — TELEPHONE ENCOUNTER
[facility]. He is to take 0.25mg weekly x 1 month and then we will increase dosage to 0.5mg if tolerating well. If he needs help with how to inject he can stop by clinic. I also sent in 25mcg of Levothyroxine. Start taking the 200 and the 25mcg together in the mornings 1 hour before other meds and food. Recheck TSH in 6-8 weeks.

## 2024-08-28 NOTE — TELEPHONE ENCOUNTER
Jessika De Leon, [place]  8/28/2024  8:55 AM CDT       Labs reviewed: Hgb A1C 10.7 which is far too high. His glucose was 316 at time of lab draw. We have listed Jardiance 25mg dly, Amaryl 4mg dly, and Lantus 60 units nightly. However, he states he has only been taking Lantus 55 units nightly. We have got to work to get his A1C. He needs to increase Lantus to 60 units. Please ask him if he has ever tried to GLP1s such as Ozempic, Mounjaro, Trulicity? Or if he is willing to try these? We have made referral to Endocrinology as well. Work hard on low carb/no concentrated sweets diet and increase exercise as tolerated. TSH was very high at 7.250. Make sure he is taking his 200mcg of Levothyroxine as ordered, 30 mins before food or other meds. If he is taking as ordered we will need to increase dosage.       Discussed lab results with Pt's wife Marly. Notified her of increase in Lantus dosage to 60 units. She stated that she does not believe the Pt has previously tried any GLP1 medications. She reported that he is taking his levothyroxine 200 mcg first thing in the morning before taking any other meds or eating. Instructed her to have the Pt change to waiting one full hour after taking his levothyroxine before eating or taking any other medications. She voiced understanding and stated that she will discuss this with the Pt and stated that they may make an appt to come in to discuss the results.

## 2024-08-29 DIAGNOSIS — E11.42 TYPE 2 DIABETES MELLITUS WITH DIABETIC POLYNEUROPATHY, WITH LONG-TERM CURRENT USE OF INSULIN: Primary | ICD-10-CM

## 2024-08-29 DIAGNOSIS — Z79.4 TYPE 2 DIABETES MELLITUS WITH DIABETIC POLYNEUROPATHY, WITH LONG-TERM CURRENT USE OF INSULIN: Primary | ICD-10-CM

## 2024-08-29 DIAGNOSIS — E11.42 TYPE 2 DIABETES MELLITUS WITH DIABETIC POLYNEUROPATHY, WITH LONG-TERM CURRENT USE OF INSULIN: ICD-10-CM

## 2024-08-29 DIAGNOSIS — Z79.4 TYPE 2 DIABETES MELLITUS WITH DIABETIC POLYNEUROPATHY, WITH LONG-TERM CURRENT USE OF INSULIN: ICD-10-CM

## 2024-08-29 RX ORDER — DULAGLUTIDE 0.75 MG/.5ML
0.75 INJECTION, SOLUTION SUBCUTANEOUS
Qty: 4 PEN | Refills: 1 | Status: SHIPPED | OUTPATIENT
Start: 2024-08-29 | End: 2024-08-29 | Stop reason: CLARIF

## 2024-08-29 RX ORDER — DULAGLUTIDE 0.75 MG/.5ML
0.75 INJECTION, SOLUTION SUBCUTANEOUS
Refills: 1 | OUTPATIENT
Start: 2024-08-29 | End: 2025-08-29

## 2024-08-29 NOTE — TELEPHONE ENCOUNTER
Spoke with [facility], was notified that the Pt's insurance does not have any GLP1s on the formulary, however, Ozempic is the preferred GLP1 so he must try and fail it first before any others can be covered. Please sign order for Ozempic and a prior authorization will be performed.

## 2024-08-30 RX ORDER — SEMAGLUTIDE 0.68 MG/ML
0.25 INJECTION, SOLUTION SUBCUTANEOUS
Qty: 3 ML | Refills: 1 | Status: SHIPPED | OUTPATIENT
Start: 2024-08-30

## 2024-09-03 NOTE — TELEPHONE ENCOUNTER
Can someone try to call him again and see if we need to call in a different drop or what he wants us to do?

## 2024-09-04 RX ORDER — CYCLOSPORINE 0.5 MG/ML
1 EMULSION OPHTHALMIC 2 TIMES DAILY
Qty: 180 EACH | Refills: 3 | Status: SHIPPED | OUTPATIENT
Start: 2024-09-04

## 2024-09-04 NOTE — TELEPHONE ENCOUNTER
I spoke with patient about his eye drops not being covered by [facility]. He is agreeable to use whatever medication is covered by the [facility]. Also discussed patient's lab results. Explained to him that a GLP1 was recommended, an increase in his Lantus to 60 units daily along with an increase in his thyroid medication. Pt declines endocrinology appt at this time. He is agreeable to GLP1 prescription. [facility] would [facility].

## 2024-09-04 NOTE — TELEPHONE ENCOUNTER
I left voice mail for patient to call the clinic. Need to discuss what patient desires for eye drops and to make sure patient received letter for results of recent labs.

## 2024-09-24 DIAGNOSIS — G56.00 CARPAL TUNNEL SYNDROME, UNSPECIFIED UPPER LIMB: Primary | ICD-10-CM

## 2024-09-30 ENCOUNTER — TELEPHONE (OUTPATIENT)
Dept: ORTHOPEDICS | Facility: CLINIC | Age: 59
End: 2024-09-30
Payer: OTHER GOVERNMENT

## 2024-09-30 NOTE — TELEPHONE ENCOUNTER
PLEASE SCHEDULE PATIENT WITH DR. GRAY IF HE HAS HAD EMG STUDIES COMPLETE.     SCHEDULE WITH NP OR PA IF PATIENT HAS NOT HAS EMG/NERVE STUDY COMPLETED.

## 2024-10-04 DIAGNOSIS — G56.00 CARPAL TUNNEL SYNDROME, UNSPECIFIED UPPER LIMB: Primary | ICD-10-CM

## 2024-10-11 ENCOUNTER — OFFICE VISIT (OUTPATIENT)
Dept: FAMILY MEDICINE | Facility: CLINIC | Age: 59
End: 2024-10-11
Payer: OTHER GOVERNMENT

## 2024-10-11 VITALS
TEMPERATURE: 98 F | SYSTOLIC BLOOD PRESSURE: 106 MMHG | DIASTOLIC BLOOD PRESSURE: 69 MMHG | BODY MASS INDEX: 31.34 KG/M2 | RESPIRATION RATE: 19 BRPM | WEIGHT: 212.19 LBS | OXYGEN SATURATION: 97 % | HEART RATE: 85 BPM

## 2024-10-11 DIAGNOSIS — K52.9 GASTROENTERITIS: ICD-10-CM

## 2024-10-11 DIAGNOSIS — R19.7 DIARRHEA, UNSPECIFIED TYPE: ICD-10-CM

## 2024-10-11 DIAGNOSIS — R11.0 NAUSEA: Primary | ICD-10-CM

## 2024-10-11 LAB
ALBUMIN SERPL BCP-MCNC: 3.5 G/DL (ref 3.5–5)
ALBUMIN/GLOB SERPL: 0.7 {RATIO}
ALP SERPL-CCNC: 82 U/L (ref 45–115)
ALT SERPL W P-5'-P-CCNC: 30 U/L (ref 16–61)
ANION GAP SERPL CALCULATED.3IONS-SCNC: 11 MMOL/L (ref 7–16)
AST SERPL W P-5'-P-CCNC: 31 U/L (ref 15–37)
BASOPHILS # BLD AUTO: 0.03 K/UL (ref 0–0.2)
BASOPHILS NFR BLD AUTO: 0.4 % (ref 0–1)
BILIRUB SERPL-MCNC: 3.8 MG/DL (ref ?–1.2)
BUN SERPL-MCNC: 20 MG/DL (ref 7–18)
BUN/CREAT SERPL: 22 (ref 6–20)
CALCIUM SERPL-MCNC: 8.9 MG/DL (ref 8.5–10.1)
CHLORIDE SERPL-SCNC: 102 MMOL/L (ref 98–107)
CO2 SERPL-SCNC: 26 MMOL/L (ref 21–32)
CREAT SERPL-MCNC: 0.93 MG/DL (ref 0.7–1.3)
DIFFERENTIAL METHOD BLD: ABNORMAL
EGFR (NO RACE VARIABLE) (RUSH/TITUS): 95 ML/MIN/1.73M2
EOSINOPHIL # BLD AUTO: 0.04 K/UL (ref 0–0.5)
EOSINOPHIL NFR BLD AUTO: 0.5 % (ref 1–4)
ERYTHROCYTE [DISTWIDTH] IN BLOOD BY AUTOMATED COUNT: 13.6 % (ref 11.5–14.5)
GLOBULIN SER-MCNC: 4.7 G/DL (ref 2–4)
GLUCOSE SERPL-MCNC: 166 MG/DL (ref 74–106)
HCT VFR BLD AUTO: 50.9 % (ref 40–54)
HGB BLD-MCNC: 16.2 G/DL (ref 13.5–18)
IMM GRANULOCYTES # BLD AUTO: 0.03 K/UL (ref 0–0.04)
IMM GRANULOCYTES NFR BLD: 0.4 % (ref 0–0.4)
LIPASE SERPL-CCNC: 32 U/L (ref 16–77)
LYMPHOCYTES # BLD AUTO: 0.94 K/UL (ref 1–4.8)
LYMPHOCYTES NFR BLD AUTO: 12.4 % (ref 27–41)
MCH RBC QN AUTO: 29.2 PG (ref 27–31)
MCHC RBC AUTO-ENTMCNC: 31.8 G/DL (ref 32–36)
MCV RBC AUTO: 91.9 FL (ref 80–96)
MONOCYTES # BLD AUTO: 0.74 K/UL (ref 0–0.8)
MONOCYTES NFR BLD AUTO: 9.7 % (ref 2–6)
MPC BLD CALC-MCNC: 12.3 FL (ref 9.4–12.4)
NEUTROPHILS # BLD AUTO: 5.81 K/UL (ref 1.8–7.7)
NEUTROPHILS NFR BLD AUTO: 76.6 % (ref 53–65)
NRBC # BLD AUTO: 0 X10E3/UL
NRBC, AUTO (.00): 0 %
PLATELET # BLD AUTO: 165 K/UL (ref 150–400)
POTASSIUM SERPL-SCNC: 4.6 MMOL/L (ref 3.5–5.1)
PROT SERPL-MCNC: 8.2 G/DL (ref 6.4–8.2)
RBC # BLD AUTO: 5.54 M/UL (ref 4.6–6.2)
SODIUM SERPL-SCNC: 134 MMOL/L (ref 136–145)
WBC # BLD AUTO: 7.59 K/UL (ref 4.5–11)

## 2024-10-11 PROCEDURE — 85025 COMPLETE CBC W/AUTO DIFF WBC: CPT | Mod: ,,, | Performed by: CLINICAL MEDICAL LABORATORY

## 2024-10-11 PROCEDURE — 80053 COMPREHEN METABOLIC PANEL: CPT | Mod: ,,, | Performed by: CLINICAL MEDICAL LABORATORY

## 2024-10-11 PROCEDURE — 83690 ASSAY OF LIPASE: CPT | Mod: ,,, | Performed by: CLINICAL MEDICAL LABORATORY

## 2024-10-11 RX ORDER — ONDANSETRON 4 MG/1
4 TABLET, FILM COATED ORAL EVERY 6 HOURS PRN
Qty: 28 TABLET | Refills: 1 | Status: SHIPPED | OUTPATIENT
Start: 2024-10-11

## 2024-10-11 RX ORDER — DIPHENOXYLATE HYDROCHLORIDE AND ATROPINE SULFATE 2.5; .025 MG/1; MG/1
1 TABLET ORAL 4 TIMES DAILY PRN
Qty: 60 TABLET | Refills: 0 | Status: SHIPPED | OUTPATIENT
Start: 2024-10-11

## 2024-10-11 NOTE — PATIENT INSTRUCTIONS
For the next few days, follow a bland diet consisting of broth and other bland foots. Stay away from greasy foods.   Drink plenty of sugar free gatorade  Take zofran as needed for nausea  Take Imodium or Pepto Bismol as needed for diarrhea  5.   Hold Ozempic dose on Sunday   6.   Please go to the emergency room should symptoms persist or worsen   7.    Please refrain from alcohol use

## 2024-10-11 NOTE — PROGRESS NOTES
Renato Clancy DO   [place] [id number] [facility] [address]  [place]  [address]      PATIENT NAME: James Mcclellan  : 1965  DATE: 10/11/24  MRN: [id number]      Billing Provider: Renato Clancy DO  Level of Service:   Patient PCP Information       Provider PCP Type    Jessika De Leon NP General            Reason for Visit / Chief Complaint: Nausea (James Mcclellan 58 y/o here for Nausea ongoing since Monday night, Tuesday morning. Patient denies any vomiting. He has been able to keep some fluids. States he has not eaten in 2 days.  Denies fever, cough, congestion. He does have slight headache of 5-6. Feels better when he laying down. ), Diarrhea (Patient reports diarrhea started last night. Has not taken any OTC antidiarrhea medication. Denies noticing blood in stool, Pt does report having constipation issues but he has not taken any medication to make him have bowel movement. Pt and wife does report this is his 4th week taking Ozempic for Type 2 diabetes), and Letter for School/Work (Patient requesting work excuse for yesterday and today. )         History of Present Illness / Problem Focused Workflow     Nausea  Associated symptoms include nausea. Pertinent negatives include no abdominal pain, arthralgias, chest pain, chills, coughing, diaphoresis, fever, headaches, numbness, rash, sore throat or vomiting.   Diarrhea   Pertinent negatives include no abdominal pain, arthralgias, chills, coughing, fever, headaches or vomiting.   HPI as noted.  Patient is presenting with his wife at the time of my exam.  Patient denies dysuria, hematuria, melena and hematochezia.  Patient has never had symptoms like this in the past.    Review of Systems     @Review of Systems   Constitutional:  Negative for chills, diaphoresis and fever.   HENT:  Negative for sore throat.    Eyes:  Negative for visual disturbance.   Respiratory:  Negative for cough and shortness of breath.    Cardiovascular:  Negative for chest pain  and palpitations.   Gastrointestinal:  Positive for diarrhea and nausea. Negative for abdominal pain and vomiting.   Genitourinary:  Negative for dysuria and hematuria.   Musculoskeletal:  Negative for arthralgias and leg pain.   Integumentary:  Negative for rash.   Neurological:  Negative for dizziness, light-headedness, numbness and headaches.       Medical / Social / Family History     Past Medical History:   Diagnosis Date    Bell's palsy 10/15/2018    Left Side of Face    Degenerative cervical disc     GERD (gastroesophageal reflux disease)     History of COVID-19 01/19/2021    Hypertension     Hypothyroidism     Mild nonproliferative diabetic retinopathy of both eyes without macular edema associated with type 2 diabetes mellitus 01/30/2023    Neck pain     Regular astigmatism of both eyes     From eye exam on 01/30/2023    Type 2 diabetes mellitus        Past Surgical History:   Procedure Laterality Date    ANTERIOR CRUCIATE LIGAMENT REPAIR      CHOLECYSTECTOMY      SINUS SURGERY         Medications and Allergies     Medications  Outpatient Medications Marked as Taking for the 10/11/24 encounter (Office Visit) with Renato Clancy, DO   Medication Sig Dispense Refill    amLODIPine (NORVASC) 5 MG tablet Take 1 tablet (5 mg total) by mouth once daily. 90 tablet 1    aspirin 81 MG Chew Take 1 tablet (81 mg total) by mouth once daily. 90 tablet 1    atorvastatin (LIPITOR) 80 MG tablet Take 1 tablet (80 mg total) by mouth once daily. 90 tablet 1    glimepiride (AMARYL) 4 MG tablet TAKE 2 TABLETS DAILY WITH BREAKFAST 180 tablet 1    insulin glargine U-100, Lantus, (LANTUS SOLOSTAR U-100 INSULIN) 100 unit/mL (3 mL) InPn pen Inject 60 Units into the skin every evening. 54 mL 0    levothyroxine (SYNTHROID) 200 MCG tablet Take 1 tablet (200 mcg total) by mouth before breakfast. 90 tablet 1    levothyroxine (SYNTHROID) 25 MCG tablet Take 1 tablet (25 mcg total) by mouth before breakfast. 30 tablet 11    omeprazole  (PRILOSEC) 20 MG capsule Take 1 capsule (20 mg total) by mouth once daily. 90 capsule 1    semaglutide (OZEMPIC) 0.25 mg or 0.5 mg (2 mg/3 mL) pen injector Inject 0.25 mg into the skin every 7 days. 3 mL 1       Allergies  Review of patient's allergies indicates:  No Known Allergies    Physical Examination     Vitals:    10/11/24 0900   BP: 106/69   Pulse: 85   Resp: 19   Temp: 98.3 °F (36.8 °C)     Physical Exam  Vitals and nursing note reviewed.   Constitutional:       General: He is not in acute distress.     Appearance: He is not ill-appearing, toxic-appearing or diaphoretic.   HENT:      Head: Normocephalic and atraumatic.      Right Ear: External ear normal.      Left Ear: External ear normal.      Nose: Nose normal.      Mouth/Throat:      Pharynx: No oropharyngeal exudate or posterior oropharyngeal erythema.   Eyes:      General: No scleral icterus.        Right eye: No discharge.         Left eye: No discharge.      Extraocular Movements: Extraocular movements intact.   Neck:      Vascular: No carotid bruit.   Cardiovascular:      Rate and Rhythm: Normal rate and regular rhythm.      Pulses: Normal pulses.      Heart sounds: Normal heart sounds. No murmur heard.     No friction rub. No gallop.   Pulmonary:      Effort: Pulmonary effort is normal. No respiratory distress.      Breath sounds: No stridor. No wheezing, rhonchi or rales.   Chest:      Chest wall: No tenderness.   Abdominal:      Palpations: Abdomen is soft.      Tenderness: There is generalized abdominal tenderness. There is no guarding or rebound. Negative signs include Jordan's sign, Rovsing's sign and McBurney's sign.      Comments: Mild diffuse tenderness to palpation.  No peritoneal signs.   Musculoskeletal:         General: No swelling.      Right lower leg: No edema.      Left lower leg: No edema.   Skin:     General: Skin is warm and dry.   Neurological:      Mental Status: He is alert and oriented to person, place, and time.                Lab Results   Component Value Date    WBC 5.80 08/27/2024    HGB 15.2 08/27/2024    HCT 47.9 08/27/2024    MCV 92.1 08/27/2024     (L) 08/27/2024        CMP  Sodium   Date Value Ref Range Status   08/27/2024 139 136 - 145 mmol/L Final     Potassium   Date Value Ref Range Status   08/27/2024 4.0 3.5 - 5.1 mmol/L Final     Chloride   Date Value Ref Range Status   08/27/2024 103 98 - 107 mmol/L Final     CO2   Date Value Ref Range Status   08/27/2024 30 21 - 32 mmol/L Final     Glucose   Date Value Ref Range Status   08/27/2024 316 (H) 74 - 106 mg/dL Final     BUN   Date Value Ref Range Status   08/27/2024 13 7 - 18 mg/dL Final     Creatinine   Date Value Ref Range Status   08/27/2024 0.88 0.70 - 1.30 mg/dL Final     Calcium   Date Value Ref Range Status   08/27/2024 8.9 8.5 - 10.1 mg/dL Final     Total Protein   Date Value Ref Range Status   08/27/2024 7.5 6.4 - 8.2 g/dL Final     Albumin   Date Value Ref Range Status   08/27/2024 3.7 3.5 - 5.0 g/dL Final     Bilirubin, Total   Date Value Ref Range Status   08/27/2024 1.5 (H) >0.0 - 1.2 mg/dL Final     Alk Phos   Date Value Ref Range Status   08/27/2024 76 45 - 115 U/L Final     AST   Date Value Ref Range Status   08/27/2024 38 (H) 15 - 37 U/L Final     ALT   Date Value Ref Range Status   08/27/2024 51 16 - 61 U/L Final     Anion Gap   Date Value Ref Range Status   08/27/2024 10 7 - 16 mmol/L Final     eGFR   Date Value Ref Range Status   08/27/2024 100 >=60 mL/min/1.73m2 Final     Procedures   Assessment and Plan (including Health Maintenance)   :    Plan:     Problem List Items Addressed This Visit    None  Visit Diagnoses       Nausea    -  Primary    Relevant Medications    ondansetron (ZOFRAN) 4 MG tablet    Other Relevant Orders    Lipase    CBC Auto Differential    Comprehensive Metabolic Panel    Diarrhea, unspecified type        Relevant Medications    diphenoxylate-atropine 2.5-0.025 mg (LOMOTIL) 2.5-0.025 mg per tablet    Gastroenteritis             Patient appears to be in mild distress secondary to symptoms.  Viral gastroenteritis is on the differential.  Gastroparesis as well as adverse effects of recent Ozempic initiation may also be contributory.  Patient given Zofran and Lomotil for symptom control.  Patient advised to hydrate liberally and follow a bland diet over the next few days.  Blood work will be obtained today.  Patient advised to hold his Ozempic dose this Sunday but to continue the rest of his diabetic medications.  Wife has been checking her 's blood sugars.  Patient and wife advised that Amaryl can contribute to hypoglycemia and to hold this medicine if necessary.  Patient counseled at length to call, return to clinic or present to the ED should symptoms persist or worsen.  Follow up on Monday.  Patient and wife signal understanding and agreement with the plan of care.    Health Maintenance Topics with due status: Not Due       Topic Last Completion Date    Diabetes Urine Screening 01/30/2024    Eye Exam 05/07/2024    Lipid Panel 05/14/2024    Foot Exam 08/27/2024    Hemoglobin A1c 08/27/2024    Low Dose Statin 10/11/2024    RSV Vaccine (Age 60+ and Pregnant patients) Not Due       Future Appointments   Date Time Provider Department Center   10/14/2024 10:20 AM Renato Clancy DO [facility] CHAMED [place]   11/5/2024 10:45 AM Omer Reese MD [facility] [[email or web address]   11/26/2024  9:40 AM Jessika De Leon NP [facility] [place]        Health Maintenance Due   Topic Date Due    HIV Screening  Never done    Colorectal Cancer Screening  Never done    Pneumococcal Vaccines (Age 0-64) (2 of 2 - PCV) 11/03/2015    Shingles Vaccine (1 of 2) Never done    TETANUS VACCINE  08/26/2018          Signature:  Renato Clancy DO  [facility]  [address] [place]  [address]    Date of encounter: 10/11/24

## 2024-10-11 NOTE — LETTER
October 11, 2024      [facility]  [address] [address] [place] [address]  Phone: [phone]  Fax: [phone]       Patient: James Mcclellan   YOB: 1965  Date of Visit: 10/11/2024    To Whom It May Concern:    Solange Mcclellan  was at [facility] on 10/11/2024. The patient may return to work/school on 10/15/2024 with no restrictions. Please excuse patient from 10/10/2024 to 10/14/2024. If you have any questions or concerns, or if I can be of further assistance, please do not hesitate to contact me.    Sincerely,    Erlinda Wilcox MA

## 2024-10-14 ENCOUNTER — OFFICE VISIT (OUTPATIENT)
Dept: FAMILY MEDICINE | Facility: CLINIC | Age: 59
End: 2024-10-14
Payer: OTHER GOVERNMENT

## 2024-10-14 VITALS
WEIGHT: 212.63 LBS | DIASTOLIC BLOOD PRESSURE: 78 MMHG | OXYGEN SATURATION: 97 % | SYSTOLIC BLOOD PRESSURE: 104 MMHG | HEART RATE: 82 BPM | RESPIRATION RATE: 20 BRPM | BODY MASS INDEX: 31.49 KG/M2 | HEIGHT: 69 IN | TEMPERATURE: 98 F

## 2024-10-14 DIAGNOSIS — R11.2 NAUSEA AND VOMITING, UNSPECIFIED VOMITING TYPE: Primary | ICD-10-CM

## 2024-10-14 DIAGNOSIS — E80.6 BILIRUBINEMIA: ICD-10-CM

## 2024-10-14 PROCEDURE — 99213 OFFICE O/P EST LOW 20 MIN: CPT | Mod: ,,, | Performed by: FAMILY MEDICINE

## 2024-10-14 RX ORDER — METOCLOPRAMIDE 10 MG/1
10 TABLET ORAL
Qty: 120 TABLET | Refills: 0 | Status: SHIPPED | OUTPATIENT
Start: 2024-10-14

## 2024-10-14 NOTE — PROGRESS NOTES
Renato Clancy DO   [place] [address]  [place]  [address]      PATIENT NAME: James Mcclellan  : 1965  DATE: 10/14/24  MRN: [id number]      Billing Provider: Renato Clancy DO  Level of Service:   Patient PCP Information       Provider PCP Type    Renato Clancy DO General            Reason for Visit / Chief Complaint: Follow-up (Patient is a 59 year old  male who presents to the clinic related to follow up, was seen on Friday related to gastroenteritis.  Patient reports has not been taking Ozempic but is taking all other medications as prescribed.  Patient reports diarrhea x 2 since Friday, none in the last day.  Patient denies vomiting since Friday, however still feels nauseated when he eats just a few bites.  ), Dizziness (Patient reports dizziness that started this morning.  Patient has been drinking gatorade.  ), Diabetes (Patient reports glucose 113 this morning. ), and Health Maintenance (Patient declines all care gaps at this time. )         History of Present Illness / Problem Focused Workflow     Follow-up  Associated symptoms include nausea. Pertinent negatives include no abdominal pain, arthralgias, chest pain, chills, coughing, diaphoresis, fever, headaches, numbness, rash, sore throat or vomiting.   Dizziness:    Associated symptoms: nausea.no fever, no headaches, no vomiting, no diaphoresis, no light-headedness, no palpitations and no chest pain.  Diabetes  Hypoglycemia symptoms include dizziness. Pertinent negatives for hypoglycemia include no headaches. Pertinent negatives for diabetes include no chest pain.       HPI as noted.  Patient denies melena and hematochezia as well as hematemesis.    Review of Systems     @Review of Systems   Constitutional:  Negative for chills, diaphoresis and fever.   HENT:  Negative for sore throat.    Eyes:  Negative for visual disturbance.   Respiratory:  Negative for cough and shortness of breath.    Cardiovascular:  Negative for  chest pain and palpitations.   Gastrointestinal:  Positive for nausea. Negative for abdominal pain, diarrhea and vomiting.   Genitourinary:  Negative for dysuria and hematuria.   Musculoskeletal:  Negative for arthralgias and leg pain.   Integumentary:  Negative for rash.   Neurological:  Positive for dizziness. Negative for light-headedness, numbness and headaches.       Medical / Social / Family History     Past Medical History:   Diagnosis Date    Bell's palsy 10/15/2018    Left Side of Face    Degenerative cervical disc     GERD (gastroesophageal reflux disease)     History of COVID-19 01/19/2021    Hypertension     Hypothyroidism     Mild nonproliferative diabetic retinopathy of both eyes without macular edema associated with type 2 diabetes mellitus 01/30/2023    Neck pain     Regular astigmatism of both eyes     From eye exam on 01/30/2023    Type 2 diabetes mellitus        Past Surgical History:   Procedure Laterality Date    ANTERIOR CRUCIATE LIGAMENT REPAIR      CHOLECYSTECTOMY      SINUS SURGERY         Medications and Allergies     Medications  Outpatient Medications Marked as Taking for the 10/14/24 encounter (Office Visit) with Renato Clancy, DO   Medication Sig Dispense Refill    alcohol swabs (ALCOHOL PREP PADS) PadM Apply 1 each topically 3 (three) times daily. 300 each 1    amLODIPine (NORVASC) 5 MG tablet Take 1 tablet (5 mg total) by mouth once daily. 90 tablet 1    aspirin 81 MG Chew Take 1 tablet (81 mg total) by mouth once daily. 90 tablet 1    atorvastatin (LIPITOR) 80 MG tablet Take 1 tablet (80 mg total) by mouth once daily. 90 tablet 1    cholecalciferol, vitamin D3, (VITAMIN D3) 25 mcg (1,000 unit) capsule Take 1 capsule (1,000 Units total) by mouth once daily. 90 capsule 1    cycloSPORINE (RESTASIS) 0.05 % ophthalmic emulsion Place 1 drop into both eyes 2 (two) times daily. 180 each 3    diphenoxylate-atropine 2.5-0.025 mg (LOMOTIL) 2.5-0.025 mg per tablet Take 1 tablet by mouth 4  "(four) times daily as needed for Diarrhea. 60 tablet 0    empagliflozin (JARDIANCE) 25 mg tablet Take 1 tablet (25 mg total) by mouth once daily. 90 tablet 1    FREESTYLE LANCETS 28 gauge lancets Use to check blood glucose once daily. 100 each 1    FREESTYLE LITE STRIPS Strp Use to check blood glucose once daily 100 strip 1    gabapentin (NEURONTIN) 300 MG capsule Take 2 capsules (600 mg total) by mouth 3 (three) times daily. 270 capsule 1    glimepiride (AMARYL) 4 MG tablet TAKE 2 TABLETS DAILY WITH BREAKFAST 180 tablet 1    insulin glargine U-100, Lantus, (LANTUS SOLOSTAR U-100 INSULIN) 100 unit/mL (3 mL) InPn pen Inject 60 Units into the skin every evening. 54 mL 0    levothyroxine (SYNTHROID) 200 MCG tablet Take 1 tablet (200 mcg total) by mouth before breakfast. 90 tablet 1    levothyroxine (SYNTHROID) 25 MCG tablet Take 1 tablet (25 mcg total) by mouth before breakfast. 30 tablet 11    lisinopriL (PRINIVIL,ZESTRIL) 20 MG tablet Take 1 tablet (20 mg total) by mouth once daily. 90 tablet 1    magnesium oxide 420 mg Tab Take 1 tablet by mouth once daily. 90 each 1    omega-3 acid ethyl esters (LOVAZA) 1 gram capsule Take 2 capsules (2 g total) by mouth 2 (two) times daily. 360 capsule 1    omeprazole (PRILOSEC) 20 MG capsule Take 1 capsule (20 mg total) by mouth once daily. 90 capsule 1    ondansetron (ZOFRAN) 4 MG tablet Take 1 tablet (4 mg total) by mouth every 6 (six) hours as needed for Nausea. 28 tablet 1    pen needle, diabetic 31 gauge x 3/16" Ndle Use to administer insulin once daily 90 each 1    XIIDRA 5 % Dpet Place 1 drop into both eyes 2 (two) times daily. 60 each 0       Allergies  Review of patient's allergies indicates:  No Known Allergies    Physical Examination     Vitals:    10/14/24 0931   BP: 104/78   Pulse: 82   Resp: 20   Temp: 98 °F (36.7 °C)     Physical Exam  Vitals and nursing note reviewed.   Constitutional:       General: He is not in acute distress.     Appearance: He is not " ill-appearing, toxic-appearing or diaphoretic.   HENT:      Head: Normocephalic and atraumatic.      Right Ear: External ear normal.      Left Ear: External ear normal.      Nose: Nose normal.      Mouth/Throat:      Pharynx: No oropharyngeal exudate or posterior oropharyngeal erythema.   Eyes:      General: No scleral icterus.        Right eye: No discharge.         Left eye: No discharge.      Extraocular Movements: Extraocular movements intact.   Neck:      Vascular: No carotid bruit.   Cardiovascular:      Rate and Rhythm: Normal rate and regular rhythm.      Pulses: Normal pulses.      Heart sounds: Normal heart sounds. No murmur heard.     No friction rub. No gallop.   Pulmonary:      Effort: Pulmonary effort is normal. No respiratory distress.      Breath sounds: No stridor. No wheezing, rhonchi or rales.   Chest:      Chest wall: No tenderness.   Abdominal:      Palpations: Abdomen is soft.      Tenderness: There is no abdominal tenderness. There is no guarding.   Musculoskeletal:         General: No swelling.      Right lower leg: No edema.      Left lower leg: No edema.   Skin:     General: Skin is warm and dry.   Neurological:      Mental Status: He is alert and oriented to person, place, and time.               Lab Results   Component Value Date    WBC 7.59 10/11/2024    HGB 16.2 10/11/2024    HCT 50.9 10/11/2024    MCV 91.9 10/11/2024     10/11/2024        CMP  Sodium   Date Value Ref Range Status   10/11/2024 134 (L) 136 - 145 mmol/L Final     Potassium   Date Value Ref Range Status   10/11/2024 4.6 3.5 - 5.1 mmol/L Final     Chloride   Date Value Ref Range Status   10/11/2024 102 98 - 107 mmol/L Final     CO2   Date Value Ref Range Status   10/11/2024 26 21 - 32 mmol/L Final     Glucose   Date Value Ref Range Status   10/11/2024 166 (H) 74 - 106 mg/dL Final     BUN   Date Value Ref Range Status   10/11/2024 20 (H) 7 - 18 mg/dL Final     Creatinine   Date Value Ref Range Status   10/11/2024 0.93  0.70 - 1.30 mg/dL Final     Calcium   Date Value Ref Range Status   10/11/2024 8.9 8.5 - 10.1 mg/dL Final     Total Protein   Date Value Ref Range Status   10/11/2024 8.2 6.4 - 8.2 g/dL Final     Albumin   Date Value Ref Range Status   10/11/2024 3.5 3.5 - 5.0 g/dL Final     Bilirubin, Total   Date Value Ref Range Status   10/11/2024 3.8 (H) >0.0 - 1.2 mg/dL Final     Alk Phos   Date Value Ref Range Status   10/11/2024 82 45 - 115 U/L Final     AST   Date Value Ref Range Status   10/11/2024 31 15 - 37 U/L Final     ALT   Date Value Ref Range Status   10/11/2024 30 16 - 61 U/L Final     Anion Gap   Date Value Ref Range Status   10/11/2024 11 7 - 16 mmol/L Final     eGFR   Date Value Ref Range Status   10/11/2024 95 >=60 mL/min/1.73m2 Final     Procedures   Assessment and Plan (including Health Maintenance)   :    Plan:     Problem List Items Addressed This Visit          GI    Nausea and vomiting - Primary    Relevant Medications    metoclopramide HCl (REGLAN) 10 MG tablet    Other Relevant Orders    Ambulatory referral/consult to Gastroenterology     Other Visit Diagnoses       Bilirubinemia        Relevant Orders    US Abdomen Limited        Patient's bilirubin noted to be chronically elevated.  Liver ultrasound will be ordered.  Patient will be referred to GI.  Patient will be switched from Zofran to Reglan today as gastroparesis may still be on the differential.  Patient held his last dose of Ozempic this past Sunday.  Patient advised to continue taking Ozempic should symptoms permit.  Follow up in 2 weeks (to allow for GI appointment) or sooner if needed.  Patient counseled at length to call, return to clinic or present to the ED should symptoms persist or worsen.  Patient signals understanding and agreement with the plan of care.    Health Maintenance Topics with due status: Not Due       Topic Last Completion Date    Diabetes Urine Screening 01/30/2024    Eye Exam 05/07/2024    Lipid Panel 05/14/2024    Foot  Exam 08/27/2024    Hemoglobin A1c 08/27/2024    Low Dose Statin 10/14/2024    RSV Vaccine (Age 60+ and Pregnant patients) Not Due       Future Appointments   Date Time Provider Department Center   10/16/2024  9:00 AM [facility] [address] [place] [address] [facility] [place]    10/21/2024  9:20 AM Nolvia Long, NP [facility]   10/29/2024  9:20 AM Renato Clancy DO [facility]   11/5/2024 10:45 AM [facility]        Health Maintenance Due   Topic Date Due    HIV Screening  Never done    Colorectal Cancer Screening  Never done    Pneumococcal Vaccines (Age 0-64) (2 of 2 - PCV) 11/03/2015    Shingles Vaccine (1 of 2) Never done    TETANUS VACCINE  08/26/2018          Signature:  [facility]  [address] [place] [address], [place]  [address]    Date of encounter: 10/14/24

## 2024-10-16 ENCOUNTER — HOSPITAL ENCOUNTER (OUTPATIENT)
Dept: RADIOLOGY | Facility: HOSPITAL | Age: 59
Discharge: HOME | End: 2024-10-16
Attending: FAMILY MEDICINE
Payer: OTHER GOVERNMENT

## 2024-10-16 DIAGNOSIS — E80.6 BILIRUBINEMIA: ICD-10-CM

## 2024-10-16 PROCEDURE — 76705 ECHO EXAM OF ABDOMEN: CPT | Mod: 26,,, | Performed by: RADIOLOGY

## 2024-10-16 PROCEDURE — 76705 ECHO EXAM OF ABDOMEN: CPT | Mod: TC

## 2024-10-18 ENCOUNTER — TELEPHONE (OUTPATIENT)
Dept: FAMILY MEDICINE | Facility: CLINIC | Age: 59
End: 2024-10-18
Payer: OTHER GOVERNMENT

## 2024-10-18 NOTE — TELEPHONE ENCOUNTER
[place] authorization not completed for Pt's referral to GI. Canceled appt for Monday, 10/21/24. Called to notify Pt of cancellation, no answer. Left voicemail for return call. Provider Dr. Clancy notified of canceled appt, he wants to know if the Pt's symptoms have improved at all.

## 2024-10-21 NOTE — TELEPHONE ENCOUNTER
Called Pt, discussed VA Authorization for GI visit which is in process.     Asked Pt about his symptoms, he reported that he is still experiencing nausea without vomiting, especially every time he tries to eat. Pt's fasting glucose has been running low in the mornings, as low as 50, so Pt has stopped all of his diabetic medications to avoid further hypoglycemia as he is not eating much due to nausea. Instructed Pt to try to find some bland foods he can tolerate and eat throughout the day to prevent hypoglycemia, he voiced understanding.

## 2024-10-29 ENCOUNTER — OFFICE VISIT (OUTPATIENT)
Dept: FAMILY MEDICINE | Facility: CLINIC | Age: 59
End: 2024-10-29
Payer: OTHER GOVERNMENT

## 2024-10-29 VITALS
OXYGEN SATURATION: 94 % | RESPIRATION RATE: 20 BRPM | HEART RATE: 68 BPM | SYSTOLIC BLOOD PRESSURE: 110 MMHG | BODY MASS INDEX: 31.55 KG/M2 | WEIGHT: 213 LBS | HEIGHT: 69 IN | DIASTOLIC BLOOD PRESSURE: 76 MMHG | TEMPERATURE: 98 F

## 2024-10-29 DIAGNOSIS — K58.9 IRRITABLE BOWEL SYNDROME, UNSPECIFIED TYPE: ICD-10-CM

## 2024-10-29 DIAGNOSIS — K21.9 GASTROESOPHAGEAL REFLUX DISEASE, UNSPECIFIED WHETHER ESOPHAGITIS PRESENT: Primary | ICD-10-CM

## 2024-10-29 LAB
T3 SERPL-MCNC: 85 NG/DL (ref 60–181)
T4 FREE SERPL-MCNC: 0.88 NG/DL (ref 0.76–1.46)
TSH SERPL DL<=0.005 MIU/L-ACNC: 16.8 UIU/ML (ref 0.36–3.74)

## 2024-10-29 PROCEDURE — 84443 ASSAY THYROID STIM HORMONE: CPT | Mod: ,,, | Performed by: CLINICAL MEDICAL LABORATORY

## 2024-10-29 PROCEDURE — 84480 ASSAY TRIIODOTHYRONINE (T3): CPT | Mod: ,,, | Performed by: CLINICAL MEDICAL LABORATORY

## 2024-10-29 PROCEDURE — 84439 ASSAY OF FREE THYROXINE: CPT | Mod: ,,, | Performed by: CLINICAL MEDICAL LABORATORY

## 2024-10-29 PROCEDURE — 99213 OFFICE O/P EST LOW 20 MIN: CPT | Mod: ,,, | Performed by: FAMILY MEDICINE

## 2024-10-29 RX ORDER — PANTOPRAZOLE SODIUM 40 MG/1
40 TABLET, DELAYED RELEASE ORAL DAILY
Qty: 90 TABLET | Refills: 3 | Status: SHIPPED | OUTPATIENT
Start: 2024-10-29 | End: 2025-10-29

## 2024-10-29 NOTE — PROGRESS NOTES
"   Renato Clancy DO   [place] [address]  [place]  [address]      PATIENT NAME: James Mcclellan  : 1965  DATE: 10/29/24  MRN: [id number]      Billing Provider: Renato Clacny DO  Level of Service:   Patient PCP Information       Provider PCP Type    Renato Clancy DO General            Reason for Visit / Chief Complaint: Follow-up (Patient is here for follow up of abdominal pain. He is still having the nausea, vomiting and diarrhea. Pain is worse after eating or drinking. Patient reports having a lot of "indigestion and gas". He had ultrasound performed 2 weeks ago.)         History of Present Illness / Problem Focused Workflow     HPI  HPI as noted.  Patient is a 59-year-old male presenting for follow up on abdominal pain.  Patient states that diarrhea has subsided with the Lomotil.  Patient states that his appetite is diminished secondary to bloating.    Review of Systems     @Review of Systems   Constitutional:  Negative for chills, diaphoresis and fever.   HENT:  Negative for sore throat.    Eyes:  Negative for visual disturbance.   Respiratory:  Negative for cough and shortness of breath.    Cardiovascular:  Negative for chest pain and palpitations.   Gastrointestinal:  Negative for abdominal pain, diarrhea, nausea and vomiting.   Genitourinary:  Negative for dysuria and hematuria.   Musculoskeletal:  Negative for arthralgias and leg pain.   Integumentary:  Negative for rash.   Neurological:  Negative for dizziness, light-headedness, numbness and headaches.       Medical / Social / Family History     Past Medical History:   Diagnosis Date    Bell's palsy 10/15/2018    Left Side of Face    Degenerative cervical disc     GERD (gastroesophageal reflux disease)     History of COVID-19 2021    Hypertension     Hypothyroidism     Mild nonproliferative diabetic retinopathy of both eyes without macular edema associated with type 2 diabetes mellitus 2023    Neck pain     " Regular astigmatism of both eyes     From eye exam on 01/30/2023    Type 2 diabetes mellitus        Past Surgical History:   Procedure Laterality Date    ANTERIOR CRUCIATE LIGAMENT REPAIR      CHOLECYSTECTOMY      SINUS SURGERY         Medications and Allergies     Medications  Outpatient Medications Marked as Taking for the 10/29/24 encounter (Office Visit) with Renato Clancy, DO   Medication Sig Dispense Refill    alcohol swabs (ALCOHOL PREP PADS) PadM Apply 1 each topically 3 (three) times daily. 300 each 1    amLODIPine (NORVASC) 5 MG tablet Take 1 tablet (5 mg total) by mouth once daily. 90 tablet 1    aspirin 81 MG Chew Take 1 tablet (81 mg total) by mouth once daily. 90 tablet 1    atorvastatin (LIPITOR) 80 MG tablet Take 1 tablet (80 mg total) by mouth once daily. 90 tablet 1    cholecalciferol, vitamin D3, (VITAMIN D3) 25 mcg (1,000 unit) capsule Take 1 capsule (1,000 Units total) by mouth once daily. 90 capsule 1    cycloSPORINE (RESTASIS) 0.05 % ophthalmic emulsion Place 1 drop into both eyes 2 (two) times daily. 180 each 3    diphenoxylate-atropine 2.5-0.025 mg (LOMOTIL) 2.5-0.025 mg per tablet Take 1 tablet by mouth 4 (four) times daily as needed for Diarrhea. 60 tablet 0    empagliflozin (JARDIANCE) 25 mg tablet Take 1 tablet (25 mg total) by mouth once daily. 90 tablet 1    FREESTYLE LANCETS 28 gauge lancets Use to check blood glucose once daily. 100 each 1    FREESTYLE LITE STRIPS Strp Use to check blood glucose once daily 100 strip 1    gabapentin (NEURONTIN) 300 MG capsule Take 2 capsules (600 mg total) by mouth 3 (three) times daily. 270 capsule 1    glimepiride (AMARYL) 4 MG tablet TAKE 2 TABLETS DAILY WITH BREAKFAST 180 tablet 1    insulin glargine U-100, Lantus, (LANTUS SOLOSTAR U-100 INSULIN) 100 unit/mL (3 mL) InPn pen Inject 60 Units into the skin every evening. 54 mL 0    levothyroxine (SYNTHROID) 200 MCG tablet Take 1 tablet (200 mcg total) by mouth before breakfast. 90 tablet 1     "levothyroxine (SYNTHROID) 25 MCG tablet Take 1 tablet (25 mcg total) by mouth before breakfast. 30 tablet 11    lisinopriL (PRINIVIL,ZESTRIL) 20 MG tablet Take 1 tablet (20 mg total) by mouth once daily. 90 tablet 1    magnesium oxide 420 mg Tab Take 1 tablet by mouth once daily. 90 each 1    metoclopramide HCl (REGLAN) 10 MG tablet Take 1 tablet (10 mg total) by mouth 4 (four) times daily before meals and nightly. 120 tablet 0    omega-3 acid ethyl esters (LOVAZA) 1 gram capsule Take 2 capsules (2 g total) by mouth 2 (two) times daily. 360 capsule 1    ondansetron (ZOFRAN) 4 MG tablet Take 1 tablet (4 mg total) by mouth every 6 (six) hours as needed for Nausea. 28 tablet 1    pen needle, diabetic 31 gauge x 3/16" Ndle Use to administer insulin once daily 90 each 1    XIIDRA 5 % Dpet Place 1 drop into both eyes 2 (two) times daily. 60 each 0    [DISCONTINUED] omeprazole (PRILOSEC) 20 MG capsule Take 1 capsule (20 mg total) by mouth once daily. 90 capsule 1       Allergies  Review of patient's allergies indicates:  No Known Allergies    Physical Examination     Vitals:    10/29/24 0935   BP: 110/76   Pulse: 68   Resp: 20   Temp: 97.6 °F (36.4 °C)     Physical Exam  Vitals and nursing note reviewed.   Constitutional:       General: He is not in acute distress.     Appearance: He is not ill-appearing, toxic-appearing or diaphoretic.   HENT:      Head: Normocephalic and atraumatic.      Right Ear: External ear normal.      Left Ear: External ear normal.      Nose: Nose normal.      Mouth/Throat:      Pharynx: No oropharyngeal exudate or posterior oropharyngeal erythema.   Eyes:      General: No scleral icterus.        Right eye: No discharge.         Left eye: No discharge.      Extraocular Movements: Extraocular movements intact.   Neck:      Vascular: No carotid bruit.   Cardiovascular:      Rate and Rhythm: Normal rate and regular rhythm.      Pulses: Normal pulses.      Heart sounds: Normal heart sounds. No murmur " heard.     No friction rub. No gallop.   Pulmonary:      Effort: Pulmonary effort is normal. No respiratory distress.      Breath sounds: No stridor. No wheezing, rhonchi or rales.   Chest:      Chest wall: No tenderness.   Abdominal:      Palpations: Abdomen is soft.      Tenderness: There is no abdominal tenderness. There is no guarding.   Musculoskeletal:         General: No swelling.      Right lower leg: No edema.      Left lower leg: No edema.   Skin:     General: Skin is warm and dry.   Neurological:      Mental Status: He is alert and oriented to person, place, and time.               Lab Results   Component Value Date    WBC 7.59 10/11/2024    HGB 16.2 10/11/2024    HCT 50.9 10/11/2024    MCV 91.9 10/11/2024     10/11/2024        CMP  Sodium   Date Value Ref Range Status   10/11/2024 134 (L) 136 - 145 mmol/L Final     Potassium   Date Value Ref Range Status   10/11/2024 4.6 3.5 - 5.1 mmol/L Final     Chloride   Date Value Ref Range Status   10/11/2024 102 98 - 107 mmol/L Final     CO2   Date Value Ref Range Status   10/11/2024 26 21 - 32 mmol/L Final     Glucose   Date Value Ref Range Status   10/11/2024 166 (H) 74 - 106 mg/dL Final     BUN   Date Value Ref Range Status   10/11/2024 20 (H) 7 - 18 mg/dL Final     Creatinine   Date Value Ref Range Status   10/11/2024 0.93 0.70 - 1.30 mg/dL Final     Calcium   Date Value Ref Range Status   10/11/2024 8.9 8.5 - 10.1 mg/dL Final     Total Protein   Date Value Ref Range Status   10/11/2024 8.2 6.4 - 8.2 g/dL Final     Albumin   Date Value Ref Range Status   10/11/2024 3.5 3.5 - 5.0 g/dL Final     Bilirubin, Total   Date Value Ref Range Status   10/11/2024 3.8 (H) >0.0 - 1.2 mg/dL Final     Alk Phos   Date Value Ref Range Status   10/11/2024 82 45 - 115 U/L Final     AST   Date Value Ref Range Status   10/11/2024 31 15 - 37 U/L Final     ALT   Date Value Ref Range Status   10/11/2024 30 16 - 61 U/L Final     Anion Gap   Date Value Ref Range Status    10/11/2024 11 7 - 16 mmol/L Final     eGFR   Date Value Ref Range Status   10/11/2024 95 >=60 mL/min/1.73m2 Final     Procedures   Assessment and Plan (including Health Maintenance)   :    Plan:     Problem List Items Addressed This Visit          GI    Gastroesophageal reflux disease - Primary    Relevant Medications    pantoprazole (PROTONIX) 40 MG tablet     Other Visit Diagnoses       Irritable bowel syndrome, unspecified type        Relevant Orders    TSH (Completed)    T3 (Completed)    T4, Free (Completed)        Blood work will be obtained today.  Patient will started on Protonix.  Follow up in one-week or sooner if needed.  Patient counseled at length to call, return to clinic or present to the ED should symptoms persist or worsen.  Patient' signals understanding and agreement with the plan of care.    Health Maintenance Topics with due status: Not Due       Topic Last Completion Date    Eye Exam 05/07/2024    Lipid Panel 05/14/2024    Foot Exam 08/27/2024    Low Dose Statin 01/01/2025    RSV Vaccine (Age 60+ and Pregnant patients) Not Due       Future Appointments   Date Time Provider Department Center   1/21/2025  9:20 AM Nolvia Long, NP [facility]   2/4/2025  8:00 AM Renato Clancy DO [facility]        Health Maintenance Due   Topic Date Due    HIV Screening  Never done    Colorectal Cancer Screening  Never done    Pneumococcal Vaccines (Age 50+) (2 of 2 - PCV) 11/03/2015    Shingles Vaccine (1 of 2) Never done    TETANUS VACCINE  08/26/2018    Hemoglobin A1c  11/27/2024    Diabetes Urine Screening  01/30/2025          Signature:  Renato Clancy DO  [facility]  [address] [place] [address], [place]  [address]    Date of encounter: 10/29/24

## 2024-10-29 NOTE — PATIENT INSTRUCTIONS
Stop taking Prilosec. Start taking Protonix.     I would recommend taking a probiotic supplement. I would recommend Align, but it is some what expensive. You can also try Culturelle or Digestive Advantage. You can also ask the pharmacist about probiotic brands.     For bloating, you can also try any peppermint oil containing supplement such as IBGard, FDGard or Iberogast.     Try to avoid greasy and fried foods. Try to identify the foods that improve or worsen symptoms.

## 2024-11-05 ENCOUNTER — OFFICE VISIT (OUTPATIENT)
Dept: ORTHOPEDICS | Facility: CLINIC | Age: 59
End: 2024-11-05
Payer: OTHER GOVERNMENT

## 2024-11-05 DIAGNOSIS — G56.00 CARPAL TUNNEL SYNDROME, UNSPECIFIED UPPER LIMB: ICD-10-CM

## 2024-11-05 DIAGNOSIS — M18.11 ARTHRITIS OF CARPOMETACARPAL (CMC) JOINT OF RIGHT THUMB: Primary | ICD-10-CM

## 2024-11-05 PROCEDURE — 20600 DRAIN/INJ JOINT/BURSA W/O US: CPT | Mod: PBBFAC,RT | Performed by: ORTHOPAEDIC SURGERY

## 2024-11-05 PROCEDURE — 99999PBSHW PR PBB SHADOW TECHNICAL ONLY FILED TO HB: Mod: PBBFAC,,,

## 2024-11-05 PROCEDURE — 99999 PR PBB SHADOW E&M-EST. PATIENT-LVL III: CPT | Mod: PBBFAC,,, | Performed by: ORTHOPAEDIC SURGERY

## 2024-11-05 PROCEDURE — 99213 OFFICE O/P EST LOW 20 MIN: CPT | Mod: PBBFAC | Performed by: ORTHOPAEDIC SURGERY

## 2024-11-05 RX ORDER — TRIAMCINOLONE ACETONIDE 40 MG/ML
40 INJECTION, SUSPENSION INTRA-ARTICULAR; INTRAMUSCULAR
Status: DISCONTINUED | OUTPATIENT
Start: 2024-11-05 | End: 2024-11-05 | Stop reason: HOSPADM

## 2024-11-05 RX ADMIN — TRIAMCINOLONE ACETONIDE 40 MG: 400 INJECTION, SUSPENSION INTRA-ARTICULAR; INTRAMUSCULAR at 10:11

## 2024-11-07 ENCOUNTER — OFFICE VISIT (OUTPATIENT)
Dept: FAMILY MEDICINE | Facility: CLINIC | Age: 59
End: 2024-11-07
Payer: OTHER GOVERNMENT

## 2024-11-07 VITALS
BODY MASS INDEX: 31.1 KG/M2 | HEIGHT: 69 IN | OXYGEN SATURATION: 97 % | RESPIRATION RATE: 15 BRPM | SYSTOLIC BLOOD PRESSURE: 112 MMHG | DIASTOLIC BLOOD PRESSURE: 72 MMHG | WEIGHT: 210 LBS | TEMPERATURE: 98 F | HEART RATE: 77 BPM

## 2024-11-07 DIAGNOSIS — K76.9 LIVER DISEASE, UNSPECIFIED: Primary | ICD-10-CM

## 2024-11-07 PROCEDURE — 99212 OFFICE O/P EST SF 10 MIN: CPT | Mod: ,,, | Performed by: FAMILY MEDICINE

## 2024-11-07 RX ORDER — BISMUTH SUBSALICYLATE 525 MG/30ML
15 LIQUID ORAL EVERY 6 HOURS PRN
COMMUNITY

## 2024-11-07 NOTE — PROGRESS NOTES
Renato Clancy DO   [place] [address]  [place]  [address]      PATIENT NAME: James Mcclellan  : 1965  DATE: 24  MRN: [id number]      Billing Provider: Renato Clancy DO  Level of Service:   Patient PCP Information       Provider PCP Type    Renato Clancy DO General            Reason for Visit / Chief Complaint: Gastroesophageal Reflux (I week follow up on GI issues, Pt reports that his indigestion has improved. He has not gotten his appointment with the GI specialist yet. ) and Diarrhea (1 week follow up, Pt reports that his diarrhea is controlled with the medication.)         History of Present Illness / Problem Focused Workflow     HPI    HPI noted.  Patient is a 59-year-old male presenting follow up on nausea and vomiting.  Patient reports that nausea and vomiting have resolved.  However he continues to experience diarrhea.  Diarrhea is well controlled on current medication.  Patient did not try any of the over-the-counter remedies as recommended during his last appointment.  Patient denies fevers, chills, abdominal pain, dysuria and hematuria.  Although specialists referrals have been placed, [facility] approval has not been received yet.    Review of Systems     @Review of Systems   Constitutional:  Negative for chills, diaphoresis and fever.   HENT:  Negative for sore throat.    Eyes:  Negative for visual disturbance.   Respiratory:  Negative for cough and shortness of breath.    Cardiovascular:  Negative for chest pain and palpitations.   Gastrointestinal:  Positive for diarrhea. Negative for abdominal pain, nausea and vomiting.   Genitourinary:  Negative for dysuria and hematuria.   Musculoskeletal:  Negative for arthralgias and leg pain.   Integumentary:  Negative for rash.   Neurological:  Negative for dizziness, light-headedness, numbness and headaches.       Medical / Social / Family History     Past Medical History:   Diagnosis Date    Bell's palsy 10/15/2018    Left  Side of Face    Degenerative cervical disc     GERD (gastroesophageal reflux disease)     History of COVID-19 01/19/2021    Hypertension     Hypothyroidism     Mild nonproliferative diabetic retinopathy of both eyes without macular edema associated with type 2 diabetes mellitus 01/30/2023    Neck pain     Regular astigmatism of both eyes     From eye exam on 01/30/2023    Type 2 diabetes mellitus        Past Surgical History:   Procedure Laterality Date    ANTERIOR CRUCIATE LIGAMENT REPAIR      CHOLECYSTECTOMY      SINUS SURGERY         Medications and Allergies     Medications  Outpatient Medications Marked as Taking for the 11/7/24 encounter (Office Visit) with Renato Clancy,    Medication Sig Dispense Refill    alcohol swabs (ALCOHOL PREP PADS) PadM Apply 1 each topically 3 (three) times daily. 300 each 1    amLODIPine (NORVASC) 5 MG tablet Take 1 tablet (5 mg total) by mouth once daily. 90 tablet 1    aspirin 81 MG Chew Take 1 tablet (81 mg total) by mouth once daily. 90 tablet 1    atorvastatin (LIPITOR) 80 MG tablet Take 1 tablet (80 mg total) by mouth once daily. 90 tablet 1    Bifidobacterium infantis 4 mg Cap Take 1 capsule by mouth Daily.      bismuth subsalicylate (PEPTO BISMOL) 262 mg/15 mL suspension Take 15 mLs by mouth every 6 (six) hours as needed for Indigestion.      cholecalciferol, vitamin D3, (VITAMIN D3) 25 mcg (1,000 unit) capsule Take 1 capsule (1,000 Units total) by mouth once daily. 90 capsule 1    cycloSPORINE (RESTASIS) 0.05 % ophthalmic emulsion Place 1 drop into both eyes 2 (two) times daily. 180 each 3    diphenoxylate-atropine 2.5-0.025 mg (LOMOTIL) 2.5-0.025 mg per tablet Take 1 tablet by mouth 4 (four) times daily as needed for Diarrhea. 60 tablet 0    empagliflozin (JARDIANCE) 25 mg tablet Take 1 tablet (25 mg total) by mouth once daily. 90 tablet 1    FREESTYLE LANCETS 28 gauge lancets Use to check blood glucose once daily. 100 each 1    FREESTYLE LITE STRIPS Strp Use to  "check blood glucose once daily 100 strip 1    gabapentin (NEURONTIN) 300 MG capsule Take 2 capsules (600 mg total) by mouth 3 (three) times daily. 270 capsule 1    glimepiride (AMARYL) 4 MG tablet TAKE 2 TABLETS DAILY WITH BREAKFAST 180 tablet 1    insulin glargine U-100, Lantus, (LANTUS SOLOSTAR U-100 INSULIN) 100 unit/mL (3 mL) InPn pen Inject 60 Units into the skin every evening. 54 mL 0    levothyroxine (SYNTHROID) 200 MCG tablet Take 1 tablet (200 mcg total) by mouth before breakfast. 90 tablet 1    levothyroxine (SYNTHROID) 25 MCG tablet Take 1 tablet (25 mcg total) by mouth before breakfast. 30 tablet 11    lisinopriL (PRINIVIL,ZESTRIL) 20 MG tablet Take 1 tablet (20 mg total) by mouth once daily. 90 tablet 1    magnesium oxide 420 mg Tab Take 1 tablet by mouth once daily. 90 each 1    metoclopramide HCl (REGLAN) 10 MG tablet Take 1 tablet (10 mg total) by mouth 4 (four) times daily before meals and nightly. 120 tablet 0    omega-3 acid ethyl esters (LOVAZA) 1 gram capsule Take 2 capsules (2 g total) by mouth 2 (two) times daily. 360 capsule 1    ondansetron (ZOFRAN) 4 MG tablet Take 1 tablet (4 mg total) by mouth every 6 (six) hours as needed for Nausea. 28 tablet 1    pantoprazole (PROTONIX) 40 MG tablet Take 1 tablet (40 mg total) by mouth once daily. 90 tablet 3    pen needle, diabetic 31 gauge x 3/16" Ndle Use to administer insulin once daily 90 each 1    XIIDRA 5 % Dpet Place 1 drop into both eyes 2 (two) times daily. 60 each 0       Allergies  Review of patient's allergies indicates:  No Known Allergies    Physical Examination     Vitals:    11/07/24 0832   BP: 112/72   Pulse: 77   Resp: 15   Temp: 97.8 °F (36.6 °C)     Physical Exam  Vitals and nursing note reviewed.   Constitutional:       General: He is not in acute distress.     Appearance: He is not ill-appearing, toxic-appearing or diaphoretic.   HENT:      Head: Normocephalic and atraumatic.      Right Ear: External ear normal.      Left Ear: " External ear normal.      Nose: Nose normal.      Mouth/Throat:      Pharynx: No oropharyngeal exudate or posterior oropharyngeal erythema.   Eyes:      General: No scleral icterus.        Right eye: No discharge.         Left eye: No discharge.      Extraocular Movements: Extraocular movements intact.   Neck:      Vascular: No carotid bruit.   Cardiovascular:      Rate and Rhythm: Normal rate and regular rhythm.      Pulses: Normal pulses.      Heart sounds: Normal heart sounds. No murmur heard.     No friction rub. No gallop.   Pulmonary:      Effort: Pulmonary effort is normal. No respiratory distress.      Breath sounds: No stridor. No wheezing, rhonchi or rales.   Chest:      Chest wall: No tenderness.   Abdominal:      Palpations: Abdomen is soft.      Tenderness: There is no abdominal tenderness. There is no guarding.   Musculoskeletal:         General: No swelling.      Right lower leg: No edema.      Left lower leg: No edema.   Skin:     General: Skin is warm and dry.   Neurological:      Mental Status: He is alert and oriented to person, place, and time.               Lab Results   Component Value Date    WBC 7.59 10/11/2024    HGB 16.2 10/11/2024    HCT 50.9 10/11/2024    MCV 91.9 10/11/2024     10/11/2024        CMP  Sodium   Date Value Ref Range Status   10/11/2024 134 (L) 136 - 145 mmol/L Final     Potassium   Date Value Ref Range Status   10/11/2024 4.6 3.5 - 5.1 mmol/L Final     Chloride   Date Value Ref Range Status   10/11/2024 102 98 - 107 mmol/L Final     CO2   Date Value Ref Range Status   10/11/2024 26 21 - 32 mmol/L Final     Glucose   Date Value Ref Range Status   10/11/2024 166 (H) 74 - 106 mg/dL Final     BUN   Date Value Ref Range Status   10/11/2024 20 (H) 7 - 18 mg/dL Final     Creatinine   Date Value Ref Range Status   10/11/2024 0.93 0.70 - 1.30 mg/dL Final     Calcium   Date Value Ref Range Status   10/11/2024 8.9 8.5 - 10.1 mg/dL Final     Total Protein   Date Value Ref Range  Status   10/11/2024 8.2 6.4 - 8.2 g/dL Final     Albumin   Date Value Ref Range Status   10/11/2024 3.5 3.5 - 5.0 g/dL Final     Bilirubin, Total   Date Value Ref Range Status   10/11/2024 3.8 (H) >0.0 - 1.2 mg/dL Final     Alk Phos   Date Value Ref Range Status   10/11/2024 82 45 - 115 U/L Final     AST   Date Value Ref Range Status   10/11/2024 31 15 - 37 U/L Final     ALT   Date Value Ref Range Status   10/11/2024 30 16 - 61 U/L Final     Anion Gap   Date Value Ref Range Status   10/11/2024 11 7 - 16 mmol/L Final     eGFR   Date Value Ref Range Status   10/11/2024 95 >=60 mL/min/1.73m2 Final     Procedures   Assessment and Plan (including Health Maintenance)   :    Plan:     Problem List Items Addressed This Visit    None  Visit Diagnoses       Liver disease, unspecified    -  Primary    Relevant Orders    MRI Abdomen W WO Contrast        Patient underwent ultrasound last month.  MRI of the liver was recommended to evaluate for possible mass.  MRI will be ordered today.  Patient will be notified of results.  Continuing management contingent upon findings.  Follow up in 3 months or sooner if needed.  Patient counseled at length to call, return to clinic or present to the ED should symptoms persist or worsen.  Patient signals understanding and agreement with the plan of care.    Health Maintenance Topics with due status: Not Due       Topic Last Completion Date    Eye Exam 05/07/2024    Lipid Panel 05/14/2024    Foot Exam 08/27/2024    Low Dose Statin 11/10/2024    RSV Vaccine (Age 60+ and Pregnant patients) Not Due       Future Appointments   Date Time Provider Department Center   1/21/2025  9:20 AM [facility]   2/4/2025  8:00 AM Renato Clancy, DO [facility]        Health Maintenance Due   Topic Date Due    HIV Screening  Never done    Colorectal Cancer Screening  Never done    Pneumococcal Vaccines (Age 50+) (2 of 2 - PCV) 11/03/2015    Shingles Vaccine (1 of 2)  Never done    TETANUS VACCINE  08/26/2018    Hemoglobin A1c  11/27/2024    Diabetes Urine Screening  01/30/2025          Signature:  Renato Clancy, [facility]  [address] [address]  [address]    Date of encounter: 11/7/24

## 2024-12-18 DIAGNOSIS — R11.2 NAUSEA AND VOMITING: Primary | ICD-10-CM

## 2025-01-21 ENCOUNTER — OFFICE VISIT (OUTPATIENT)
Dept: FAMILY MEDICINE | Facility: CLINIC | Age: 60
End: 2025-01-21
Payer: OTHER GOVERNMENT

## 2025-01-21 VITALS
RESPIRATION RATE: 16 BRPM | BODY MASS INDEX: 31.55 KG/M2 | WEIGHT: 213 LBS | DIASTOLIC BLOOD PRESSURE: 75 MMHG | HEIGHT: 69 IN | OXYGEN SATURATION: 96 % | SYSTOLIC BLOOD PRESSURE: 106 MMHG | TEMPERATURE: 98 F | HEART RATE: 68 BPM

## 2025-01-21 DIAGNOSIS — R21 RASH: Primary | ICD-10-CM

## 2025-01-21 PROCEDURE — 99213 OFFICE O/P EST LOW 20 MIN: CPT | Mod: ,,, | Performed by: FAMILY MEDICINE

## 2025-01-21 RX ORDER — TRIAMCINOLONE ACETONIDE 5 MG/G
CREAM TOPICAL 2 TIMES DAILY
Qty: 15 G | Refills: 3 | Status: SHIPPED | OUTPATIENT
Start: 2025-01-21 | End: 2025-02-04

## 2025-01-30 NOTE — PROGRESS NOTES
Renato Clancy DO   [place] [address]  [place]  [address]      PATIENT NAME: James Mcclellan  : 1965  DATE: 25  MRN: [id number]      Billing Provider: Renato Clancy DO  Level of Service:   Patient PCP Information       None on File            Reason for Visit / Chief Complaint: Rash (Patient is a 59 year old male presenting today with a rash on his right ankle. He states he noticed it about 2-3 weeks ago and it has gotten bigger since then. Denies itching or burning but reports he has neuropathy. He states he has used otc ointments on it but no improvement. He is unsure the names of what he has used. )         History of Present Illness / Problem Focused Workflow     HPI    HPI as noted.  Patient denies fevers, chills, palpitations, diaphoresis, dizziness and lightheadedness.  Patient denies using new soaps, detergents or fragrances.    Review of Systems     @Review of Systems   Constitutional:  Negative for chills, diaphoresis and fever.   HENT:  Negative for sore throat.    Eyes:  Negative for visual disturbance.   Respiratory:  Negative for cough and shortness of breath.    Cardiovascular:  Negative for chest pain and palpitations.   Gastrointestinal:  Negative for abdominal pain, diarrhea, nausea and vomiting.   Genitourinary:  Negative for dysuria and hematuria.   Musculoskeletal:  Negative for arthralgias and leg pain.   Integumentary:  Positive for rash.   Neurological:  Negative for dizziness, light-headedness, numbness and headaches.       Medical / Social / Family History     Past Medical History:   Diagnosis Date    Bell's palsy 10/15/2018    Left Side of Face    Degenerative cervical disc     Diabetic polyneuropathy     GERD (gastroesophageal reflux disease)     History of COVID-19 2021    Hyperlipidemia     Hypertension     Hypothyroidism     Mild nonproliferative diabetic retinopathy of both eyes without macular edema associated with type 2 diabetes  mellitus 01/30/2023    Neck pain     EDGAR (obstructive sleep apnea)     Regular astigmatism of both eyes     From eye exam on 01/30/2023    Type 2 diabetes mellitus        Past Surgical History:   Procedure Laterality Date    ANTERIOR CRUCIATE LIGAMENT REPAIR      CHOLECYSTECTOMY      SINUS SURGERY         Medications and Allergies     Medications  Outpatient Medications Marked as Taking for the 1/21/25 encounter (Office Visit) with Renato Clancy DO   Medication Sig Dispense Refill    alcohol swabs (ALCOHOL PREP PADS) PadM Apply 1 each topically 3 (three) times daily. 300 each 1    amLODIPine (NORVASC) 5 MG tablet Take 1 tablet (5 mg total) by mouth once daily. 90 tablet 1    aspirin 81 MG Chew Take 1 tablet (81 mg total) by mouth once daily. 90 tablet 1    atorvastatin (LIPITOR) 80 MG tablet Take 1 tablet (80 mg total) by mouth once daily. 90 tablet 1    Bifidobacterium infantis 4 mg Cap Take 1 capsule by mouth Daily.      bismuth subsalicylate (PEPTO BISMOL) 262 mg/15 mL suspension Take 15 mLs by mouth every 6 (six) hours as needed for Indigestion.      cholecalciferol, vitamin D3, (VITAMIN D3) 25 mcg (1,000 unit) capsule Take 1 capsule (1,000 Units total) by mouth once daily. 90 capsule 1    cycloSPORINE (RESTASIS) 0.05 % ophthalmic emulsion Place 1 drop into both eyes 2 (two) times daily. 180 each 3    diphenoxylate-atropine 2.5-0.025 mg (LOMOTIL) 2.5-0.025 mg per tablet Take 1 tablet by mouth 4 (four) times daily as needed for Diarrhea. 60 tablet 0    empagliflozin (JARDIANCE) 25 mg tablet Take 1 tablet (25 mg total) by mouth once daily. 90 tablet 1    FREESTYLE LANCETS 28 gauge lancets Use to check blood glucose once daily. 100 each 1    FREESTYLE LITE STRIPS Strp Use to check blood glucose once daily 100 strip 1    gabapentin (NEURONTIN) 300 MG capsule Take 2 capsules (600 mg total) by mouth 3 (three) times daily. 270 capsule 1    glimepiride (AMARYL) 4 MG tablet TAKE 2 TABLETS DAILY WITH BREAKFAST 180  "tablet 1    insulin glargine U-100, Lantus, (LANTUS SOLOSTAR U-100 INSULIN) 100 unit/mL (3 mL) InPn pen Inject 60 Units into the skin every evening. 54 mL 0    levothyroxine (SYNTHROID) 200 MCG tablet Take 1 tablet (200 mcg total) by mouth before breakfast. 90 tablet 1    levothyroxine (SYNTHROID) 25 MCG tablet Take 1 tablet (25 mcg total) by mouth before breakfast. 30 tablet 11    lisinopriL (PRINIVIL,ZESTRIL) 20 MG tablet Take 1 tablet (20 mg total) by mouth once daily. 90 tablet 1    magnesium oxide 420 mg Tab Take 1 tablet by mouth once daily. 90 each 1    metoclopramide HCl (REGLAN) 10 MG tablet Take 1 tablet (10 mg total) by mouth 4 (four) times daily before meals and nightly. 120 tablet 0    omega-3 acid ethyl esters (LOVAZA) 1 gram capsule Take 2 capsules (2 g total) by mouth 2 (two) times daily. 360 capsule 1    ondansetron (ZOFRAN) 4 MG tablet Take 1 tablet (4 mg total) by mouth every 6 (six) hours as needed for Nausea. 28 tablet 1    pantoprazole (PROTONIX) 40 MG tablet Take 1 tablet (40 mg total) by mouth once daily. 90 tablet 3    pen needle, diabetic 31 gauge x 3/16" Ndle Use to administer insulin once daily 90 each 1    XIIDRA 5 % Dpet Place 1 drop into both eyes 2 (two) times daily. 60 each 0       Allergies  Review of patient's allergies indicates:  No Known Allergies    Physical Examination     Vitals:    01/21/25 1146   BP: 106/75   Pulse: 68   Resp: 16   Temp: 97.6 °F (36.4 °C)     Physical Exam  Vitals and nursing note reviewed.   Constitutional:       General: He is not in acute distress.     Appearance: He is not ill-appearing, toxic-appearing or diaphoretic.   HENT:      Head: Normocephalic and atraumatic.      Right Ear: External ear normal.      Left Ear: External ear normal.      Nose: Nose normal.      Mouth/Throat:      Pharynx: No oropharyngeal exudate or posterior oropharyngeal erythema.   Eyes:      General: No scleral icterus.        Right eye: No discharge.         Left eye: No " discharge.      Extraocular Movements: Extraocular movements intact.   Neck:      Vascular: No carotid bruit.   Cardiovascular:      Rate and Rhythm: Normal rate and regular rhythm.      Pulses: Normal pulses.      Heart sounds: Normal heart sounds. No murmur heard.     No friction rub. No gallop.   Pulmonary:      Effort: Pulmonary effort is normal. No respiratory distress.      Breath sounds: No stridor. No wheezing, rhonchi or rales.   Chest:      Chest wall: No tenderness.   Abdominal:      Palpations: Abdomen is soft.      Tenderness: There is no abdominal tenderness. There is no guarding.   Musculoskeletal:         General: No swelling.      Right lower leg: No edema.      Left lower leg: No edema.   Skin:     General: Skin is warm and dry.      Findings: Rash present. Rash is macular.      Comments: Rash noted near right ankle.  No fluctuance or drainage noted.  No tenderness to palpation.   Neurological:      Mental Status: He is alert and oriented to person, place, and time.               Lab Results   Component Value Date    WBC 7.59 10/11/2024    HGB 16.2 10/11/2024    HCT 50.9 10/11/2024    MCV 91.9 10/11/2024     10/11/2024        CMP  Sodium   Date Value Ref Range Status   10/11/2024 134 (L) 136 - 145 mmol/L Final     Potassium   Date Value Ref Range Status   10/11/2024 4.6 3.5 - 5.1 mmol/L Final     Chloride   Date Value Ref Range Status   10/11/2024 102 98 - 107 mmol/L Final     CO2   Date Value Ref Range Status   10/11/2024 26 21 - 32 mmol/L Final     Glucose   Date Value Ref Range Status   10/11/2024 166 (H) 74 - 106 mg/dL Final     BUN   Date Value Ref Range Status   10/11/2024 20 (H) 7 - 18 mg/dL Final     Creatinine   Date Value Ref Range Status   10/11/2024 0.93 0.70 - 1.30 mg/dL Final     Calcium   Date Value Ref Range Status   10/11/2024 8.9 8.5 - 10.1 mg/dL Final     Total Protein   Date Value Ref Range Status   10/11/2024 8.2 6.4 - 8.2 g/dL Final     Albumin   Date Value Ref Range  Status   10/11/2024 3.5 3.5 - 5.0 g/dL Final     Bilirubin, Total   Date Value Ref Range Status   10/11/2024 3.8 (H) >0.0 - 1.2 mg/dL Final     Alk Phos   Date Value Ref Range Status   10/11/2024 82 45 - 115 U/L Final     AST   Date Value Ref Range Status   10/11/2024 31 15 - 37 U/L Final     ALT   Date Value Ref Range Status   10/11/2024 30 16 - 61 U/L Final     Anion Gap   Date Value Ref Range Status   10/11/2024 11 7 - 16 mmol/L Final     eGFR   Date Value Ref Range Status   10/11/2024 95 >=60 mL/min/1.73m2 Final     Procedures   Assessment and Plan (including Health Maintenance)   :    1. Rash  -     triamcinolone acetonide 0.5% (KENALOG) 0.5 % Crea; Apply topically 2 (two) times daily. for 14 days  Dispense: 15 g; Refill: 3     Patient is nontoxic appearing.  Patient is in no acute distress.  Rash near ankle appears to be consistent with irritant contact dermatitis.  Patient will be given steroid cream.  Follow up in 1-2 weeks, or sooner if needed, to monitor progress on treatment.  Patient reminded that I will be leaving this clinic next month and that he could follow up with one of the nurse practitioners or clinician of his choice.  Patient counseled at length to call, return to clinic or present to the ED should symptoms persist or worsen.  Patient signals understanding and agreement with the plan of care.      Health Maintenance Topics with due status: Not Due       Topic Last Completion Date    Diabetic Eye Exam 05/07/2024    Lipid Panel 05/14/2024    Foot Exam 08/27/2024    Low Dose Statin 01/21/2025    RSV Vaccine (Age 60+ and Pregnant patients) Not Due       Future Appointments   Date Time Provider Department Center   2/4/2025  2:00 PM Jessika De Leon NP [facility]   2/19/2025  9:20 AM Nolvia Long NP [facility]        Health Maintenance Due   Topic Date Due    HIV Screening  Never done    Colorectal Cancer Screening  Never done    Pneumococcal Vaccines (Age 50+) (2 of 2  - PCV) 11/03/2015    Shingles Vaccine (1 of 2) Never done    TETANUS VACCINE  08/26/2018    Hemoglobin A1c  11/27/2024    Diabetes Urine Screening  01/30/2025          Signature:  Renato Clancy [address] [place]  [address]    Date of encounter: 1/21/25

## 2025-02-14 ENCOUNTER — TELEPHONE (OUTPATIENT)
Dept: FAMILY MEDICINE | Facility: CLINIC | Age: 60
End: 2025-02-14
Payer: OTHER GOVERNMENT

## 2025-02-14 NOTE — TELEPHONE ENCOUNTER
Spoke with Rad[facility] at [facility]. Patient received diabetic shoes in December 2024.

## 2025-02-19 ENCOUNTER — RESULTS FOLLOW-UP (OUTPATIENT)
Dept: GASTROENTEROLOGY | Facility: CLINIC | Age: 60
End: 2025-02-19
Payer: OTHER GOVERNMENT

## 2025-02-19 ENCOUNTER — OFFICE VISIT (OUTPATIENT)
Dept: GASTROENTEROLOGY | Facility: CLINIC | Age: 60
End: 2025-02-19
Payer: OTHER GOVERNMENT

## 2025-02-19 VITALS
SYSTOLIC BLOOD PRESSURE: 151 MMHG | BODY MASS INDEX: 32.14 KG/M2 | OXYGEN SATURATION: 98 % | DIASTOLIC BLOOD PRESSURE: 79 MMHG | HEART RATE: 69 BPM | WEIGHT: 217 LBS | HEIGHT: 69 IN

## 2025-02-19 DIAGNOSIS — K76.0 HEPATIC STEATOSIS: ICD-10-CM

## 2025-02-19 DIAGNOSIS — Z12.11 SCREENING FOR MALIGNANT NEOPLASM OF COLON: ICD-10-CM

## 2025-02-19 DIAGNOSIS — R11.2 NAUSEA AND VOMITING, UNSPECIFIED VOMITING TYPE: ICD-10-CM

## 2025-02-19 DIAGNOSIS — R93.5 ABNORMAL US (ULTRASOUND) OF ABDOMEN: Primary | ICD-10-CM

## 2025-02-19 DIAGNOSIS — R19.7 DIARRHEA, UNSPECIFIED TYPE: ICD-10-CM

## 2025-02-19 PROCEDURE — 99215 OFFICE O/P EST HI 40 MIN: CPT | Mod: PBBFAC

## 2025-02-19 RX ORDER — POLYETHYLENE GLYCOL 3350, SODIUM SULFATE ANHYDROUS, SODIUM BICARBONATE, SODIUM CHLORIDE, POTASSIUM CHLORIDE 236; 22.74; 6.74; 5.86; 2.97 G/4L; G/4L; G/4L; G/4L; G/4L
4 POWDER, FOR SOLUTION ORAL ONCE
Qty: 4000 ML | Refills: 0 | Status: SHIPPED | OUTPATIENT
Start: 2025-02-19 | End: 2025-02-19

## 2025-02-19 NOTE — PROGRESS NOTES
CC:  Hepatic steatosis, nausea and vomiting      HPI 59 y.o. white male presents today for diarrhea and hepatic steatosis nausea vomiting.  Patient states in his nausea vomiting is better however he does continue to at least 3 times a day diarrhea even with the use of Lomotil that his primary care has given him.  Patient says before using Lomotil he was going 6 to 8 times a day.  Patient does have a history of cholecystectomy.  He does not drink on a regular basis and does not smoke.  Patient denies any hematochezia or melena.  Patient does admit to having some dysphagia and getting choked but does not want to address that at this visit states it his follow-up we can talk about an EGD.  Patient's primary care physician ordered an MRI of his liver back in November 2024 due to abnormal ultrasound.  The impression on the ultrasound states Heterogeneous relatively echogenic appearance of the hepatic parenchyma suggesting steatosis. There are geographic appearing hypoechoic regions within the right hepatic lobe suggesting sequela of geographic fatty infiltration however MRI is recommended for confirmation and to exclude discrete mass.  Patient states an appointment was never made that he is aware of and he never had the MRI done.  Patient has not had an elastography to stage his hepatic steatosis.  Patient has never had a colonoscopy either we will plan for colonoscopy at this visit.  Labs today show hemoglobin 15.4 hematocrit 46.8 and platelet count 136 ALT 41 AST 35.  Medical records reviewed. Additional history supplemented by nursing.     Past Medical History:   Diagnosis Date    Bell's palsy 10/15/2018    Left Side of Face    Degenerative cervical disc     Diabetic polyneuropathy     GERD (gastroesophageal reflux disease)     History of COVID-19 01/19/2021    Hyperlipidemia     Hypertension     Hypothyroidism     Mild nonproliferative diabetic retinopathy of both eyes without macular edema associated with type 2  "diabetes mellitus 01/30/2023    Neck pain     EDGAR (obstructive sleep apnea)     Regular astigmatism of both eyes     From eye exam on 01/30/2023    Type 2 diabetes mellitus          Past Surgical History:   Procedure Laterality Date    ANTERIOR CRUCIATE LIGAMENT REPAIR      CHOLECYSTECTOMY      SINUS SURGERY         Social History  Social History[1]      Family History   Problem Relation Name Age of Onset    Hypertension Mother      Diabetes Father      Heart disease Father      Hypertension Father      Diabetes Sister      Hypertension Sister      Hypertension Brother         Review of Systems  General ROS: negative for chills, fever or weight loss  Psychological ROS: negative for hallucination, depression or suicidal ideation  Ophthalmic ROS: negative for blurry vision, photophobia or eye pain  ENT ROS: negative for epistaxis, sore throat or rhinorrhea  Respiratory ROS: no cough, shortness of breath, or wheezing  Cardiovascular ROS: no chest pain or dyspnea on exertion  Gastrointestinal ROS: no abdominal pain, change in bowel habits, or black/ bloody stools    Physical Examination  BP (!) 151/79 (BP Location: Left arm, Patient Position: Sitting)   Pulse 69   Ht 5' 9" (1.753 m)   Wt 98.4 kg (217 lb)   SpO2 98%   BMI 32.05 kg/m²   General appearance: alert, cooperative, no distress  HENT: Normocephalic, atraumatic, neck symmetrical, no nasal discharge   Eyes: conjunctivae/corneas clear, PERRL, EOM's intact  Lungs: no labored breathing, symmetric chest wall expansion bilaterally  Heart: regular rate and rhythm without rub; no displacement of the PMI   Abdomen: soft, non-tender; bowel sounds normoactive; no organomegaly    Labs:  Lab Results   Component Value Date    WBC 7.59 10/11/2024    HGB 16.2 10/11/2024    HCT 50.9 10/11/2024    MCV 91.9 10/11/2024     10/11/2024       CMP  Sodium   Date Value Ref Range Status   10/11/2024 134 (L) 136 - 145 mmol/L Final     Potassium   Date Value Ref Range Status "   10/11/2024 4.6 3.5 - 5.1 mmol/L Final     Chloride   Date Value Ref Range Status   10/11/2024 102 98 - 107 mmol/L Final     CO2   Date Value Ref Range Status   10/11/2024 26 21 - 32 mmol/L Final     Glucose   Date Value Ref Range Status   10/11/2024 166 (H) 74 - 106 mg/dL Final     BUN   Date Value Ref Range Status   10/11/2024 20 (H) 7 - 18 mg/dL Final     Creatinine   Date Value Ref Range Status   10/11/2024 0.93 0.70 - 1.30 mg/dL Final     Calcium   Date Value Ref Range Status   10/11/2024 8.9 8.5 - 10.1 mg/dL Final     Total Protein   Date Value Ref Range Status   10/11/2024 8.2 6.4 - 8.2 g/dL Final     Albumin   Date Value Ref Range Status   10/11/2024 3.5 3.5 - 5.0 g/dL Final     Bilirubin, Total   Date Value Ref Range Status   10/11/2024 3.8 (H) >0.0 - 1.2 mg/dL Final     Alk Phos   Date Value Ref Range Status   10/11/2024 82 45 - 115 U/L Final     AST   Date Value Ref Range Status   10/11/2024 31 15 - 37 U/L Final     ALT   Date Value Ref Range Status   10/11/2024 30 16 - 61 U/L Final     Anion Gap   Date Value Ref Range Status   10/11/2024 11 7 - 16 mmol/L Final     eGFR   Date Value Ref Range Status   06/15/2022 129 >=60 mL/min/1.73m² Final       Imaging:  MRI of of liver scheduled  Ultrasound elastography scheduled    Independently reviewed    Assessment: James Gage 58 yo WM here with:  Hepatic steatosis  Nausea vomiting  Diarrhea    Plan:   Colonoscopy scheduled for diarrhea and screening colonoscopy  MRI of the liver to rule out liver mass per radiologist recommendation  Ultrasound elastography for hepatic steatosis staging  Labs with stool studies today  Follow-up post colonoscopy or sooner    I spent a total of 45 minutes on the day of the visit.This includes face to face time and non-face to face time preparing to see the patient (eg, review of tests), obtaining and/or reviewing separately obtained history, documenting clinical information in the electronic or other health record, independently  interpreting results and communicating results to the patient/family/caregiver, or care coordinator.   Carla Adams, FNP [facility]           [1]   Social History  Tobacco Use    Smoking status: Never     Passive exposure: Current    Smokeless tobacco: Never   Substance Use Topics    Alcohol use: Never    Drug use: Never

## 2025-02-19 NOTE — PROGRESS NOTES
So far labs that have resulted show no anemia slight elevation in your AST liver enzyme.  We are still waiting on multiple test to results once those are resulted we will let you know the other information.

## 2025-02-21 ENCOUNTER — TELEPHONE (OUTPATIENT)
Dept: GASTROENTEROLOGY | Facility: CLINIC | Age: 60
End: 2025-02-21
Payer: OTHER GOVERNMENT

## 2025-02-21 NOTE — TELEPHONE ENCOUNTER
Attempted to contact pt regarding results w/o success - detailed vm left - pt advised to return call to clinic if any questions

## 2025-02-27 ENCOUNTER — TELEPHONE (OUTPATIENT)
Dept: GASTROENTEROLOGY | Facility: CLINIC | Age: 60
End: 2025-02-27
Payer: OTHER GOVERNMENT

## 2025-02-27 NOTE — TELEPHONE ENCOUNTER
Attempted to contact regarding results w/o success - detailed vm left - pt advised to return call to clinic if any questions regarding results

## 2025-03-03 ENCOUNTER — HOSPITAL ENCOUNTER (OUTPATIENT)
Dept: RADIOLOGY | Facility: HOSPITAL | Age: 60
Discharge: HOME | End: 2025-03-03
Payer: OTHER GOVERNMENT

## 2025-03-03 DIAGNOSIS — K76.0 HEPATIC STEATOSIS: ICD-10-CM

## 2025-03-03 PROCEDURE — 76981 USE PARENCHYMA: CPT | Mod: TC

## 2025-03-13 ENCOUNTER — TELEPHONE (OUTPATIENT)
Dept: GASTROENTEROLOGY | Facility: CLINIC | Age: 60
End: 2025-03-13
Payer: OTHER GOVERNMENT

## 2025-03-13 NOTE — TELEPHONE ENCOUNTER
Spoke w/ pt - results and recommendations reviewed w/ good winsome noted - denied any questions at end of encounter

## 2025-03-13 NOTE — TELEPHONE ENCOUNTER
----- Message from Marly sent at 3/12/2025 12:46 PM CDT -----  Regarding: Return Call  Who Called: James RogelgregorioPatient is returning phone callWho Left Message for Patient: UnsureDoes the patient know what this is regarding?: NoPreferred Method of Contact: Phone CallPatient's Preferred Phone Number on File: [phone] Best Call Back Number, if different:Additional Information: pt asks to please leave a detailed voicemail because he's working and will not be by the phone

## 2025-03-13 NOTE — TELEPHONE ENCOUNTER
----- Message from Nolvia Long NP sent at 3/11/2025  9:05 AM CDT -----  Metavir score of F0-F1 normal-mild liver fibrosis staging.   Exercise 150 minutes per week  Weight loss of 7-10% body weight  Diet low is saturated fats and carbohydrates  Good glucose and cholesterol control  No alcohol  -Fibrosis/fatty liver means scarring of the liver.  After a period of time (usually many years) fibrosis can become so severe that it spreads and connects to other liver tissue leading too widespread   scarring. This is causes cirrhosis.  Scarring of the liver can eventually lead to decreased functioning liver.   ----- Message -----  From: Interface, Rad Results In  Sent: 3/11/2025   8:09 AM CDT  To: Nolvia Long NP

## 2025-03-13 NOTE — TELEPHONE ENCOUNTER
----- Message from Nolvia Long NP sent at 3/11/2025 11:27 AM CDT -----  He needs to turn in his stool studies so we can see if there is inflammation in his intestines.  MRI show nodularity to his liver which shows cirrhosis. Does not show any mass on the liver.  Liver is enlarged. Nodular hepatic contour and heterogeneous appearance of the liver parenchyma suggestive for chronic liver disease/cirrhosis. No significant loss of hepatic parenchymal signal on   out of phase imaging to suggest diffuse steatosis. No suspicious enhancing liver lesion. Hepatic and portal veins are patent.     Exercise 150 minutes per week  Weight loss of 7-10% body weight  Diet low is saturated fats and carbohydrates  Good glucose and cholesterol control  No alcohol  Make sure he keeps his follow up appt and his colonoscopy appt.  ----- Message -----  From: Interface, Rad Results In  Sent: 3/11/2025  11:12 AM CDT  To: Nolvia Long NP

## 2025-04-29 ENCOUNTER — OFFICE VISIT (OUTPATIENT)
Dept: FAMILY MEDICINE | Facility: CLINIC | Age: 60
End: 2025-04-29
Payer: OTHER GOVERNMENT

## 2025-04-29 VITALS
HEIGHT: 69 IN | OXYGEN SATURATION: 96 % | WEIGHT: 215.19 LBS | BODY MASS INDEX: 31.87 KG/M2 | RESPIRATION RATE: 20 BRPM | DIASTOLIC BLOOD PRESSURE: 86 MMHG | HEART RATE: 67 BPM | TEMPERATURE: 98 F | SYSTOLIC BLOOD PRESSURE: 128 MMHG

## 2025-04-29 DIAGNOSIS — J01.40 ACUTE PANSINUSITIS, RECURRENCE NOT SPECIFIED: ICD-10-CM

## 2025-04-29 DIAGNOSIS — E11.42 TYPE 2 DIABETES MELLITUS WITH DIABETIC POLYNEUROPATHY, WITH LONG-TERM CURRENT USE OF INSULIN: ICD-10-CM

## 2025-04-29 DIAGNOSIS — E03.9 HYPOTHYROIDISM, UNSPECIFIED TYPE: ICD-10-CM

## 2025-04-29 DIAGNOSIS — G89.29 CHRONIC PAIN OF RIGHT THUMB: ICD-10-CM

## 2025-04-29 DIAGNOSIS — E78.5 HYPERLIPIDEMIA ASSOCIATED WITH TYPE 2 DIABETES MELLITUS: ICD-10-CM

## 2025-04-29 DIAGNOSIS — Z79.4 TYPE 2 DIABETES MELLITUS WITH DIABETIC POLYNEUROPATHY, WITH LONG-TERM CURRENT USE OF INSULIN: ICD-10-CM

## 2025-04-29 DIAGNOSIS — I10 ESSENTIAL HYPERTENSION, BENIGN: ICD-10-CM

## 2025-04-29 DIAGNOSIS — K21.9 GASTROESOPHAGEAL REFLUX DISEASE, UNSPECIFIED WHETHER ESOPHAGITIS PRESENT: ICD-10-CM

## 2025-04-29 DIAGNOSIS — E55.9 VITAMIN D DEFICIENCY: ICD-10-CM

## 2025-04-29 DIAGNOSIS — R11.2 NAUSEA AND VOMITING, UNSPECIFIED VOMITING TYPE: ICD-10-CM

## 2025-04-29 DIAGNOSIS — E11.69 HYPERLIPIDEMIA ASSOCIATED WITH TYPE 2 DIABETES MELLITUS: ICD-10-CM

## 2025-04-29 DIAGNOSIS — G89.4 CHRONIC PAIN SYNDROME: ICD-10-CM

## 2025-04-29 DIAGNOSIS — Z79.4 TYPE 2 DIABETES MELLITUS WITH DIABETIC POLYNEUROPATHY, WITH LONG-TERM CURRENT USE OF INSULIN: Primary | ICD-10-CM

## 2025-04-29 DIAGNOSIS — M79.644 CHRONIC PAIN OF RIGHT THUMB: ICD-10-CM

## 2025-04-29 DIAGNOSIS — E11.42 TYPE 2 DIABETES MELLITUS WITH DIABETIC POLYNEUROPATHY, WITH LONG-TERM CURRENT USE OF INSULIN: Primary | ICD-10-CM

## 2025-04-29 PROCEDURE — 96372 THER/PROPH/DIAG INJ SC/IM: CPT | Mod: ,,,

## 2025-04-29 PROCEDURE — 99214 OFFICE O/P EST MOD 30 MIN: CPT | Mod: 25,,,

## 2025-04-29 RX ORDER — DEXAMETHASONE SODIUM PHOSPHATE 4 MG/ML
4 INJECTION, SOLUTION INTRA-ARTICULAR; INTRALESIONAL; INTRAMUSCULAR; INTRAVENOUS; SOFT TISSUE
Status: COMPLETED | OUTPATIENT
Start: 2025-04-29 | End: 2025-04-29

## 2025-04-29 RX ORDER — INSULIN ASPART 100 [IU]/ML
10 INJECTION, SOLUTION INTRAVENOUS; SUBCUTANEOUS
COMMUNITY
Start: 2025-02-24

## 2025-04-29 RX ORDER — AMLODIPINE BESYLATE 5 MG/1
5 TABLET ORAL DAILY
Qty: 90 TABLET | Refills: 1 | Status: SHIPPED | OUTPATIENT
Start: 2025-04-29

## 2025-04-29 RX ORDER — PEN NEEDLE, DIABETIC 30 GX3/16"
NEEDLE, DISPOSABLE MISCELLANEOUS
Qty: 90 EACH | Refills: 1 | Status: SHIPPED | OUTPATIENT
Start: 2025-04-29

## 2025-04-29 RX ORDER — AZITHROMYCIN 250 MG/1
TABLET, FILM COATED ORAL
Qty: 6 TABLET | Refills: 0 | Status: SHIPPED | OUTPATIENT
Start: 2025-04-29 | End: 2025-05-04

## 2025-04-29 RX ORDER — METOCLOPRAMIDE 10 MG/1
10 TABLET ORAL
Qty: 120 TABLET | Refills: 0 | Status: CANCELLED | OUTPATIENT
Start: 2025-04-29

## 2025-04-29 RX ORDER — MAGNESIUM OXIDE 420 MG/1
1 TABLET ORAL DAILY
Qty: 90 EACH | Refills: 1 | Status: SHIPPED | OUTPATIENT
Start: 2025-04-29

## 2025-04-29 RX ORDER — NAPROXEN SODIUM 220 MG/1
81 TABLET, FILM COATED ORAL DAILY
Qty: 90 TABLET | Refills: 1 | Status: SHIPPED | OUTPATIENT
Start: 2025-04-29

## 2025-04-29 RX ORDER — GABAPENTIN 300 MG/1
600 CAPSULE ORAL 3 TIMES DAILY
Qty: 270 CAPSULE | Refills: 1 | Status: SHIPPED | OUTPATIENT
Start: 2025-04-29

## 2025-04-29 RX ORDER — OMEGA-3-ACID ETHYL ESTERS 1 G/1
2 CAPSULE, LIQUID FILLED ORAL 2 TIMES DAILY
Qty: 360 CAPSULE | Refills: 1 | Status: SHIPPED | OUTPATIENT
Start: 2025-04-29

## 2025-04-29 RX ORDER — ATORVASTATIN CALCIUM 80 MG/1
80 TABLET, FILM COATED ORAL DAILY
Qty: 90 TABLET | Refills: 1 | Status: SHIPPED | OUTPATIENT
Start: 2025-04-29

## 2025-04-29 RX ORDER — BLOOD-GLUCOSE SENSOR
1 EACH MISCELLANEOUS
COMMUNITY
Start: 2025-02-24 | End: 2026-02-05

## 2025-04-29 RX ORDER — CEFTRIAXONE 1 G/1
1 INJECTION, POWDER, FOR SOLUTION INTRAMUSCULAR; INTRAVENOUS
Status: COMPLETED | OUTPATIENT
Start: 2025-04-29 | End: 2025-04-29

## 2025-04-29 RX ORDER — ISOPROPYL ALCOHOL 70 ML/100ML
1 SWAB TOPICAL 3 TIMES DAILY
Qty: 300 EACH | Refills: 1 | Status: SHIPPED | OUTPATIENT
Start: 2025-04-29

## 2025-04-29 RX ORDER — VIT C/E/ZN/COPPR/LUTEIN/ZEAXAN 250MG-90MG
1000 CAPSULE ORAL DAILY
Qty: 90 CAPSULE | Refills: 1 | Status: SHIPPED | OUTPATIENT
Start: 2025-04-29

## 2025-04-29 RX ORDER — LISINOPRIL 20 MG/1
20 TABLET ORAL DAILY
Qty: 90 TABLET | Refills: 1 | Status: SHIPPED | OUTPATIENT
Start: 2025-04-29

## 2025-04-29 RX ORDER — LEVOTHYROXINE SODIUM 200 UG/1
200 TABLET ORAL
Qty: 90 TABLET | Refills: 1 | Status: SHIPPED | OUTPATIENT
Start: 2025-04-29

## 2025-04-29 RX ORDER — INSULIN GLARGINE 100 [IU]/ML
40 INJECTION, SOLUTION SUBCUTANEOUS NIGHTLY
COMMUNITY
Start: 2025-02-24

## 2025-04-29 RX ADMIN — DEXAMETHASONE SODIUM PHOSPHATE 4 MG: 4 INJECTION, SOLUTION INTRA-ARTICULAR; INTRALESIONAL; INTRAMUSCULAR; INTRAVENOUS; SOFT TISSUE at 12:04

## 2025-04-29 RX ADMIN — CEFTRIAXONE 1 G: 1 INJECTION, POWDER, FOR SOLUTION INTRAMUSCULAR; INTRAVENOUS at 12:04

## 2025-04-29 NOTE — ASSESSMENT & PLAN NOTE
BP controlled at present. Monitor BP daily and keep BP daily log to bring to next visit. Exercise at least 5 times a week. Keep scheduled appts. RTC sooner if BP is staying >140/90. Dash diet.    DASH- includes foods rich in K+, calcium and Magnesium.The diet limits foods high in sodium, saturated fats and added sugars. This is a flexible balanced eating plan that helps create heart healthy eating style for life. Diet is rich in vegetable, whole grains and fruits. It includes fat free or low fat dairy products, fish, poultry, nuts. Chose foods high in K+, calcium, magnesium, fiber, protein, and low in saturated fats and sodium.

## 2025-04-29 NOTE — ASSESSMENT & PLAN NOTE
Requesting referral to orthopedics. States he has seen Dr. Reese in the past but her is no longer with [facility].

## 2025-04-29 NOTE — ASSESSMENT & PLAN NOTE
Goal LDL is less than 70. Continue current meds and low fat/low cholesterol diet with increased exercise as tolerated. Will follow up with labs. Patient to follow up in 3 months or as needed.

## 2025-04-29 NOTE — ASSESSMENT & PLAN NOTE
Goal is less than 7. Continue current meds and low carb/no concentrated sweets diet with increased exercise as tolerated. Will follow up with lab results. Patient to follow up in 3 months or as needed.

## 2025-04-29 NOTE — PROGRESS NOTES
VON ALSTON, ANDREA   [address]  [place]  [address]        PATIENT NAME: James Mcclellan  : 1965  DATE: 25  MRN: [id number]        Reason for Visit / Chief Complaint: Sinus Problem (Patient is a 59 year old male who presents to the clinic related to sinus congestion x 1 week, feels like its moved to his chest.  Mild cough. ), Hand Pain (Patient is requesting a referral to Orthopedic for injection CMC joint of right thumb, was using Dr. Omer Reese, no longer with [facility], [facility] .), Back Pain (Patient requesting a referral to see Dr. Varela related to back pain, radiating from neck down to coccyx area, [facility] not . /), Diabetes (Patient is seeing Dr. Garcia/Endocrinology, thinks next appointment in . ), and Health Maintenance (HIV Screening Never done--declines/Colorectal Cancer Screening Never done--upcoming appt/Pneumococcal Vaccines (Age 50+)(2 of 2 - PCV) due on 2015--declines/Shingles Vaccine(1 of 2) Never done--referred to pharmacy /TETANUS VACCINE due on 2018--declines/Diabetes Urine Screening due on 2025-declines Dr. Garcia/Diabetic Eye Exam due on 2025-upcoming appt/Lipid Panel due on 2025-declines/Hemoglobin A1c due on 2025-Dr. Garcia/)       Update PCP  Update Chief Complaint         History of Present Illness / Problem Focused Workflow     James Mcclellan presents to the clinic with Sinus Problem (Patient is a 59 year old male who presents to the clinic related to sinus congestion x 1 week, feels like its moved to his chest.  Mild cough. ), Hand Pain (Patient is requesting a referral to Orthopedic for injection CMC joint of right thumb, was using Dr. Omer Reese, no longer with [facility], [facility] .), Back Pain (Patient requesting a referral to see Dr. Varela related to back pain, radiating from neck down to coccyx area, [facility] not .  /), Diabetes (Patient is seeing Dr. Garcia/Endocrinology, thinks next appointment in June. ), and Health Maintenance (HIV Screening Never done--declines/Colorectal Cancer Screening Never done--upcoming appt/Pneumococcal Vaccines (Age 50+)(2 of 2 - PCV) due on 11/03/2015--declines/Shingles Vaccine(1 of 2) Never done--referred to pharmacy /TETANUS VACCINE due on 08/26/2018--declines/Diabetes Urine Screening due on 01/30/2025-declines Dr. Garcia/Diabetic Eye Exam due on 05/07/2025-upcoming appt/Lipid Panel due on 05/14/2025-declines/Hemoglobin A1c due on 05/24/2025-Dr. Garcia/)         Review of Systems     Review of Systems   Constitutional:  Negative for chills, fatigue and fever.   HENT:  Positive for nasal congestion, sinus pressure/congestion and sore throat. Negative for ear discharge, ear pain and rhinorrhea.    Respiratory:  Positive for cough. Negative for chest tightness, shortness of breath, wheezing and stridor.    Cardiovascular:  Negative for palpitations and claudication.   Gastrointestinal:  Negative for abdominal pain, constipation, diarrhea, nausea, vomiting and reflux.   Genitourinary:  Negative for dysuria, flank pain, frequency, hematuria and urgency.   Musculoskeletal:  Negative for myalgias.   Neurological:  Negative for dizziness, weakness, light-headedness and headaches.   Psychiatric/Behavioral:  Negative for suicidal ideas.         Medical / Social / Family History     Past Medical History:   Diagnosis Date    Bell's palsy 10/15/2018    Left Side of Face    Degenerative cervical disc     Diabetic polyneuropathy     GERD (gastroesophageal reflux disease)     History of COVID-19 01/19/2021    Hyperlipidemia     Hypertension     Hypothyroidism     Mild nonproliferative diabetic retinopathy of both eyes without macular edema associated with type 2 diabetes mellitus 01/30/2023    Neck pain     EDGAR (obstructive sleep apnea)     Regular astigmatism of both eyes     From eye exam on 01/30/2023    Type 2  diabetes mellitus        Past Surgical History:   Procedure Laterality Date    ANTERIOR CRUCIATE LIGAMENT REPAIR      CHOLECYSTECTOMY      SINUS SURGERY         Social History    reports that he has never smoked. He has been exposed to tobacco smoke. He has never used smokeless tobacco. He reports that he does not drink alcohol and does not use drugs.    Family History  's family history includes Diabetes in his father and sister; Heart disease in his father; Hypertension in his brother, father, mother, and sister.    Medications and Allergies     Medications  Outpatient Medications Marked as Taking for the 4/29/25 encounter (Office Visit) with Eliecer Graham FNP   Medication Sig Dispense Refill    blood-glucose sensor (FREESTYLE KORI 3 PLUS SENSOR) She 1 each by Other route.      cycloSPORINE (RESTASIS) 0.05 % ophthalmic emulsion Place 1 drop into both eyes 2 (two) times daily. 180 each 3    FREESTYLE LANCETS 28 gauge lancets Use to check blood glucose once daily. 100 each 1    FREESTYLE LITE STRIPS Strp Use to check blood glucose once daily 100 strip 1    insulin aspart U-100 (NOVOLOG) 100 unit/mL (3 mL) InPn pen Inject 10 Units into the skin. Inject 10 units with each meal 3 times daily & if over 150 use Sliding scale      insulin glargine U-100, Lantus, (LANTUS SOLOSTAR U-100 INSULIN) 100 unit/mL (3 mL) InPn pen Inject 60 Units into the skin every evening. (Patient taking differently: Inject 40 Units into the skin every evening.) 54 mL 0    insulin glargine U-100, Lantus, (LANTUS SOLOSTAR U-100 INSULIN) 100 unit/mL (3 mL) InPn pen Inject 40 Units into the skin every evening.      pantoprazole (PROTONIX) 40 MG tablet Take 1 tablet (40 mg total) by mouth once daily. 90 tablet 3    XIIDRA 5 % Dpet Place 1 drop into both eyes 2 (two) times daily. 60 each 0    [DISCONTINUED] alcohol swabs (ALCOHOL PREP PADS) PadM Apply 1 each topically 3 (three) times daily. 300 each 1    [DISCONTINUED] amLODIPine (NORVASC)  "5 MG tablet Take 1 tablet (5 mg total) by mouth once daily. 90 tablet 1    [DISCONTINUED] aspirin 81 MG Chew Take 1 tablet (81 mg total) by mouth once daily. 90 tablet 1    [DISCONTINUED] atorvastatin (LIPITOR) 80 MG tablet Take 1 tablet (80 mg total) by mouth once daily. 90 tablet 1    [DISCONTINUED] cholecalciferol, vitamin D3, (VITAMIN D3) 25 mcg (1,000 unit) capsule Take 1 capsule (1,000 Units total) by mouth once daily. 90 capsule 1    [DISCONTINUED] empagliflozin (JARDIANCE) 25 mg tablet Take 1 tablet (25 mg total) by mouth once daily. 90 tablet 1    [DISCONTINUED] gabapentin (NEURONTIN) 300 MG capsule Take 2 capsules (600 mg total) by mouth 3 (three) times daily. 270 capsule 1    [DISCONTINUED] levothyroxine (SYNTHROID) 200 MCG tablet Take 1 tablet (200 mcg total) by mouth before breakfast. 90 tablet 1    [DISCONTINUED] lisinopriL (PRINIVIL,ZESTRIL) 20 MG tablet Take 1 tablet (20 mg total) by mouth once daily. 90 tablet 1    [DISCONTINUED] magnesium oxide 420 mg Tab Take 1 tablet by mouth once daily. 90 each 1    [DISCONTINUED] omega-3 acid ethyl esters (LOVAZA) 1 gram capsule Take 2 capsules (2 g total) by mouth 2 (two) times daily. 360 capsule 1    [DISCONTINUED] pen needle, diabetic 31 gauge x 3/16" Ndle Use to administer insulin once daily 90 each 1     Current Facility-Administered Medications for the 4/29/25 encounter (Office Visit) with Eliecer Graham FNP   Medication Dose Route Frequency Provider Last Rate Last Admin    [COMPLETED] cefTRIAXone injection 1 g  1 g Intramuscular 1 time in Clinic/HOD Eliecer Graham FNP   1 g at 04/29/25 1213    [COMPLETED] dexAMETHasone injection 4 mg  4 mg Intramuscular 1 time in Clinic/HOD Eliecer Graham FNP   4 mg at 04/29/25 1213       Allergies  Review of patient's allergies indicates:  No Known Allergies    Physical Examination   /86 (BP Location: Right arm, Patient Position: Sitting)   Pulse 67   Temp 97.5 °F (36.4 °C) (Oral)   Resp 20   Ht 5' 9" " (1.753 m)   Wt 97.6 kg (215 lb 3.2 oz)   SpO2 96%   BMI 31.78 kg/m²    Physical Exam  Vitals and nursing note reviewed.   Constitutional:       General: He is awake.      Appearance: Normal appearance.   HENT:      Head: Normocephalic.      Right Ear: Ear canal and external ear normal. A middle ear effusion is present.      Left Ear: Tympanic membrane, ear canal and external ear normal.      Nose: Congestion present.      Right Sinus: Maxillary sinus tenderness and frontal sinus tenderness present.      Left Sinus: Maxillary sinus tenderness and frontal sinus tenderness present.      Mouth/Throat:      Lips: Pink.      Mouth: Mucous membranes are moist.      Pharynx: Oropharynx is clear. Uvula midline. Posterior oropharyngeal erythema and postnasal drip present.   Cardiovascular:      Rate and Rhythm: Normal rate and regular rhythm.      Heart sounds: Normal heart sounds, S1 normal and S2 normal.   Pulmonary:      Effort: Pulmonary effort is normal. No respiratory distress.      Breath sounds: Normal breath sounds. No decreased breath sounds, wheezing, rhonchi or rales.   Abdominal:      General: Bowel sounds are normal.      Palpations: Abdomen is soft.      Tenderness: There is no abdominal tenderness.   Musculoskeletal:      Cervical back: Normal range of motion.   Skin:     General: Skin is warm.      Capillary Refill: Capillary refill takes less than 2 seconds.   Neurological:      Mental Status: He is alert and oriented to person, place, and time.   Psychiatric:         Thought Content: Thought content does not include homicidal or suicidal ideation. Thought content does not include homicidal or suicidal plan.          Assessment and Plan (including Health Maintenance)      Problem List  Smart Sets  Document Outside HM   :            Health Maintenance Due   Topic Date Due    HIV Screening  Never done    Colorectal Cancer Screening  Never done    Pneumococcal Vaccines (Age 50+) (2 of 2 - PCV) 11/03/2015     "Shingles Vaccine (1 of 2) Never done    TETANUS VACCINE  08/26/2018    Diabetes Urine Screening  01/30/2025    Diabetic Eye Exam  05/07/2025    Lipid Panel  05/14/2025    Hemoglobin A1c  05/24/2025       Problem List Items Addressed This Visit       Essential hypertension, benign    Current Assessment & Plan   BP controlled at present. Monitor BP daily and keep BP daily log to bring to next visit. Exercise at least 5 times a week. Keep scheduled appts. RTC sooner if BP is staying >140/90. Dash diet.    DASH- includes foods rich in K+, calcium and Magnesium.The diet limits foods high in sodium, saturated fats and added sugars. This is a flexible balanced eating plan that helps create heart healthy eating style for life. Diet is rich in vegetable, whole grains and fruits. It includes fat free or low fat dairy products, fish, poultry, nuts. Chose foods high in K+, calcium, magnesium, fiber, protein, and low in saturated fats and sodium.          Relevant Medications    amLODIPine (NORVASC) 5 MG tablet    aspirin 81 MG Chew    lisinopriL (PRINIVIL,ZESTRIL) 20 MG tablet    magnesium oxide 420 mg Tab    Nausea and vomiting    Type 2 diabetes mellitus - Primary    Current Assessment & Plan   Goal is less than 7. Continue current meds and low carb/no concentrated sweets diet with increased exercise as tolerated. Will follow up with lab results. Patient to follow up in 3 months or as needed.            Relevant Medications    insulin aspart U-100 (NOVOLOG) 100 unit/mL (3 mL) InPn pen    insulin glargine U-100, Lantus, (LANTUS SOLOSTAR U-100 INSULIN) 100 unit/mL (3 mL) InPn pen    alcohol swabs (ALCOHOL PREP PADS) PadM    empagliflozin (JARDIANCE) 25 mg tablet    gabapentin (NEURONTIN) 300 MG capsule    pen needle, diabetic 31 gauge x 3/16" Ndle    Hyperlipidemia associated with type 2 diabetes mellitus    Current Assessment & Plan   Goal LDL is less than 70. Continue current meds and low fat/low cholesterol diet with " increased exercise as tolerated. Will follow up with labs. Patient to follow up in 3 months or as needed.            Relevant Medications    insulin aspart U-100 (NOVOLOG) 100 unit/mL (3 mL) InPn pen    insulin glargine U-100, Lantus, (LANTUS SOLOSTAR U-100 INSULIN) 100 unit/mL (3 mL) InPn pen    atorvastatin (LIPITOR) 80 MG tablet    omega-3 acid ethyl esters (LOVAZA) 1 gram capsule    Hypothyroidism    Overview   TSH ordered; continue current medication. take medication on empty stomach         Current Assessment & Plan   Take medication with a glass of water on an empty stomach daily, wait 30 minutes before consuming anything else. Separate from vitamins, iron  by 6 hours          Relevant Medications    levothyroxine (SYNTHROID) 200 MCG tablet    Gastroesophageal reflux disease    Current Assessment & Plan   Well controlled on Omeprazole. Continue at current dosage. Follow up in 3 months or as needed.            Vitamin D deficiency    Relevant Medications    cholecalciferol, vitamin D3, (VITAMIN D3) 25 mcg (1,000 unit) capsule    Chronic pain of right thumb    Current Assessment & Plan   Requesting referral to orthopedics. States he has seen Dr. Reese in the past but her is no longer with [facility].          Relevant Orders    Ambulatory referral/consult to Orthopedics    Chronic pain syndrome    Current Assessment & Plan   Requesting referral to pain management Dr. Varela         Relevant Orders    Ambulatory referral/consult to Pain Clinic    Acute pansinusitis    Current Assessment & Plan   Reviewed pathology of current symptoms, medication side effects/risk/benefits/directions on taking medications, and worsening or persistent symptoms that require follow up in next 2-3 days. Saline/steroid nasal sprays, antihistamine use, increase fluid intake, and multivitamin/elderberry/Zinc use were recommended. May take Tylenol or Motrin as needed for pain and/or fever. Patient was instructed to take antibiotic as  directed, complete entire course to avoid antibiotic resistance, and take OTC probiotic with antibiotic to prevent GI upset. Patient verbalized understanding of treatment plan and denies any questions.           Relevant Medications    cefTRIAXone injection 1 g (Completed)    dexAMETHasone injection 4 mg (Completed)    azithromycin (Z-MCKENZIE) 250 MG tablet       Health Maintenance Topics with due status: Not Due       Topic Last Completion Date    Foot Exam 08/27/2024    Low Dose Statin 04/29/2025    RSV Vaccine (Age 60+ and Pregnant patients) Not Due       Future Appointments   Date Time Provider Department Center   6/2/2025 11:30 AM [facility] GI ROOM 03 [facility]   7/1/2025  9:00 AM Nolvia Long, NP [facility]            Signature:      ANDREA CARRANZA   [address] [place]  [address]       Date of encounter: 4/29/25

## 2025-04-29 NOTE — ASSESSMENT & PLAN NOTE
Pt arrives in ED with complaints of right foot pain near the heel. Denies any injury, states he recently started wearing a new pair of work boots. No medications PTA.    Requesting referral to pain management Dr. Varela

## 2025-04-29 NOTE — ASSESSMENT & PLAN NOTE
Reviewed pathology of current symptoms, medication side effects/risk/benefits/directions on taking medications, and worsening or persistent symptoms that require follow up in next 2-3 days. Saline/steroid nasal sprays, antihistamine use, increase fluid intake, and multivitamin/elderberry/Zinc use were recommended. May take Tylenol or Motrin as needed for pain and/or fever. Patient was instructed to take antibiotic as directed, complete entire course to avoid antibiotic resistance, and take OTC probiotic with antibiotic to prevent GI upset. Patient verbalized understanding of treatment plan and denies any questions.

## 2025-05-19 ENCOUNTER — TELEPHONE (OUTPATIENT)
Dept: GASTROENTEROLOGY | Facility: CLINIC | Age: 60
End: 2025-05-19
Payer: OTHER GOVERNMENT

## 2025-05-19 DIAGNOSIS — Z12.11 ENCOUNTER FOR SCREENING COLONOSCOPY: Primary | ICD-10-CM

## 2025-05-19 DIAGNOSIS — M79.641 RIGHT HAND PAIN: Primary | ICD-10-CM

## 2025-05-20 RX ORDER — POLYETHYLENE GLYCOL 3350, SODIUM SULFATE ANHYDROUS, SODIUM BICARBONATE, SODIUM CHLORIDE, POTASSIUM CHLORIDE 236; 22.74; 6.74; 5.86; 2.97 G/4L; G/4L; G/4L; G/4L; G/4L
4 POWDER, FOR SOLUTION ORAL ONCE
Qty: 4000 ML | Refills: 0 | Status: SHIPPED | OUTPATIENT
Start: 2025-05-20 | End: 2025-05-20

## 2025-06-02 ENCOUNTER — ANESTHESIA (OUTPATIENT)
Dept: GASTROENTEROLOGY | Facility: HOSPITAL | Age: 60
End: 2025-06-02
Payer: OTHER GOVERNMENT

## 2025-06-02 ENCOUNTER — HOSPITAL ENCOUNTER (OUTPATIENT)
Dept: GASTROENTEROLOGY | Facility: HOSPITAL | Age: 60
Discharge: HOME | End: 2025-06-02
Admitting: INTERNAL MEDICINE
Payer: OTHER GOVERNMENT

## 2025-06-02 ENCOUNTER — ANESTHESIA EVENT (OUTPATIENT)
Dept: GASTROENTEROLOGY | Facility: HOSPITAL | Age: 60
End: 2025-06-02
Payer: OTHER GOVERNMENT

## 2025-06-02 VITALS
SYSTOLIC BLOOD PRESSURE: 101 MMHG | DIASTOLIC BLOOD PRESSURE: 65 MMHG | HEIGHT: 69 IN | WEIGHT: 205 LBS | TEMPERATURE: 98 F | BODY MASS INDEX: 30.36 KG/M2 | RESPIRATION RATE: 12 BRPM | OXYGEN SATURATION: 94 % | HEART RATE: 74 BPM

## 2025-06-02 DIAGNOSIS — Z12.11 SCREENING FOR MALIGNANT NEOPLASM OF COLON: ICD-10-CM

## 2025-06-02 DIAGNOSIS — R19.7 DIARRHEA, UNSPECIFIED TYPE: ICD-10-CM

## 2025-06-02 DIAGNOSIS — M79.641 RIGHT HAND PAIN: Primary | ICD-10-CM

## 2025-06-02 PROCEDURE — 37000008 HC ANESTHESIA 1ST 15 MINUTES

## 2025-06-02 PROCEDURE — 88342 IMHCHEM/IMCYTCHM 1ST ANTB: CPT | Mod: 26,,, | Performed by: PATHOLOGY

## 2025-06-02 PROCEDURE — 88342 IMHCHEM/IMCYTCHM 1ST ANTB: CPT | Mod: TC,59,SUR | Performed by: INTERNAL MEDICINE

## 2025-06-02 PROCEDURE — 88305 TISSUE EXAM BY PATHOLOGIST: CPT | Mod: 26,,, | Performed by: PATHOLOGY

## 2025-06-02 PROCEDURE — D9220A PRA ANESTHESIA: Mod: ,,,

## 2025-06-02 PROCEDURE — 25000003 PHARM REV CODE 250

## 2025-06-02 PROCEDURE — 63600175 PHARM REV CODE 636 W HCPCS

## 2025-06-02 PROCEDURE — 37000009 HC ANESTHESIA EA ADD 15 MINS

## 2025-06-02 PROCEDURE — 27201423 OPTIME MED/SURG SUP & DEVICES STERILE SUPPLY

## 2025-06-02 RX ORDER — PROPOFOL 10 MG/ML
VIAL (ML) INTRAVENOUS
Status: DISCONTINUED | OUTPATIENT
Start: 2025-06-02 | End: 2025-06-02

## 2025-06-02 RX ORDER — LIDOCAINE HYDROCHLORIDE 20 MG/ML
INJECTION, SOLUTION EPIDURAL; INFILTRATION; INTRACAUDAL; PERINEURAL
Status: DISCONTINUED | OUTPATIENT
Start: 2025-06-02 | End: 2025-06-02

## 2025-06-02 RX ORDER — SODIUM CHLORIDE 0.9 % (FLUSH) 0.9 %
10 SYRINGE (ML) INJECTION EVERY 6 HOURS PRN
Status: DISCONTINUED | OUTPATIENT
Start: 2025-06-02 | End: 2025-06-03 | Stop reason: HOSPADM

## 2025-06-02 RX ORDER — SODIUM CHLORIDE, SODIUM LACTATE, POTASSIUM CHLORIDE, CALCIUM CHLORIDE 600; 310; 30; 20 MG/100ML; MG/100ML; MG/100ML; MG/100ML
INJECTION, SOLUTION INTRAVENOUS CONTINUOUS
Status: DISCONTINUED | OUTPATIENT
Start: 2025-06-02 | End: 2025-06-03 | Stop reason: HOSPADM

## 2025-06-02 RX ORDER — SODIUM CHLORIDE 9 MG/ML
INJECTION, SOLUTION INTRAVENOUS CONTINUOUS PRN
Status: DISCONTINUED | OUTPATIENT
Start: 2025-06-02 | End: 2025-06-02

## 2025-06-02 RX ADMIN — PROPOFOL 50 MG: 10 INJECTION, EMULSION INTRAVENOUS at 01:06

## 2025-06-02 RX ADMIN — PROPOFOL 40 MG: 10 INJECTION, EMULSION INTRAVENOUS at 01:06

## 2025-06-02 RX ADMIN — SODIUM CHLORIDE: 9 INJECTION, SOLUTION INTRAVENOUS at 01:06

## 2025-06-02 RX ADMIN — PROPOFOL 120 MG: 10 INJECTION, EMULSION INTRAVENOUS at 01:06

## 2025-06-02 RX ADMIN — LIDOCAINE HYDROCHLORIDE 100 MG: 20 INJECTION, SOLUTION EPIDURAL; INFILTRATION; INTRACAUDAL; PERINEURAL at 01:06

## 2025-06-03 ENCOUNTER — HOSPITAL ENCOUNTER (OUTPATIENT)
Dept: RADIOLOGY | Facility: HOSPITAL | Age: 60
Discharge: HOME | End: 2025-06-03
Payer: OTHER GOVERNMENT

## 2025-06-03 ENCOUNTER — OFFICE VISIT (OUTPATIENT)
Dept: ORTHOPEDICS | Facility: CLINIC | Age: 60
End: 2025-06-03
Payer: OTHER GOVERNMENT

## 2025-06-03 VITALS — WEIGHT: 205 LBS | HEIGHT: 69 IN | BODY MASS INDEX: 30.36 KG/M2

## 2025-06-03 DIAGNOSIS — M79.644 CHRONIC PAIN OF RIGHT THUMB: ICD-10-CM

## 2025-06-03 DIAGNOSIS — G89.29 CHRONIC PAIN OF RIGHT THUMB: ICD-10-CM

## 2025-06-03 DIAGNOSIS — M79.641 RIGHT HAND PAIN: ICD-10-CM

## 2025-06-03 LAB
DHEA SERPL-MCNC: NORMAL
ESTROGEN SERPL-MCNC: NORMAL PG/ML
INSULIN SERPL-ACNC: NORMAL U[IU]/ML
LAB AP GROSS DESCRIPTION: NORMAL
LAB AP LABORATORY NOTES: NORMAL
T3RU NFR SERPL: NORMAL %

## 2025-06-03 PROCEDURE — 73130 X-RAY EXAM OF HAND: CPT | Mod: TC,RT

## 2025-06-03 PROCEDURE — 99213 OFFICE O/P EST LOW 20 MIN: CPT | Mod: S$PBB,25,,

## 2025-06-03 PROCEDURE — 99999PBSHW PR PBB SHADOW TECHNICAL ONLY FILED TO HB: Mod: PBBFAC,,,

## 2025-06-03 PROCEDURE — 73130 X-RAY EXAM OF HAND: CPT | Mod: 26,RT,, | Performed by: RADIOLOGY

## 2025-06-03 PROCEDURE — 99999 PR PBB SHADOW E&M-EST. PATIENT-LVL IV: CPT | Mod: PBBFAC,,,

## 2025-06-03 PROCEDURE — 99214 OFFICE O/P EST MOD 30 MIN: CPT | Mod: PBBFAC,25

## 2025-06-03 PROCEDURE — 20600 DRAIN/INJ JOINT/BURSA W/O US: CPT | Mod: PBBFAC,RT

## 2025-06-03 RX ADMIN — TRIAMCINOLONE ACETONIDE 0.5 ML: 40 INJECTION, SUSPENSION INTRA-ARTICULAR; INTRAMUSCULAR at 01:06

## 2025-06-03 RX ADMIN — BUPIVACAINE HYDROCHLORIDE 0.5 ML: 2.5 INJECTION, SOLUTION EPIDURAL; INFILTRATION; INTRACAUDAL; PERINEURAL at 01:06

## 2025-06-03 NOTE — PROCEDURES
Small Joint Aspiration/Injection: R thumb CMC    Date/Time: 6/3/2025 1:40 PM    Performed by: Estela Garcia PA  Authorized by: Estela Garcia PA    Consent Done?:  Yes (Verbal)  Indications:  Arthritis  Site marked: the procedure site was marked    Location:  Thumb  Site:  R thumb CMC  Needle size:  25 G  Medications:  0.5 mL BUPivacaine (PF) 0.25% (2.5 mg/ml) 0.25 % (2.5 mg/mL); 0.5 mL triamcinolone acetonide 40 mg/mL  Patient tolerance:  Patient tolerated the procedure well with no immediate complications

## 2025-06-06 ENCOUNTER — TELEPHONE (OUTPATIENT)
Dept: FAMILY MEDICINE | Facility: CLINIC | Age: 60
End: 2025-06-06
Payer: OTHER GOVERNMENT

## 2025-06-06 ENCOUNTER — RESULTS FOLLOW-UP (OUTPATIENT)
Dept: GASTROENTEROLOGY | Facility: HOSPITAL | Age: 60
End: 2025-06-06

## 2025-06-12 RX ORDER — TRIAMCINOLONE ACETONIDE 40 MG/ML
0.5 INJECTION, SUSPENSION INTRA-ARTICULAR; INTRAMUSCULAR
Status: DISCONTINUED | OUTPATIENT
Start: 2025-06-03 | End: 2025-06-12 | Stop reason: HOSPADM

## 2025-06-12 RX ORDER — BUPIVACAINE HYDROCHLORIDE 2.5 MG/ML
0.5 INJECTION, SOLUTION EPIDURAL; INFILTRATION; INTRACAUDAL; PERINEURAL
Status: DISCONTINUED | OUTPATIENT
Start: 2025-06-03 | End: 2025-06-12 | Stop reason: HOSPADM

## 2025-06-12 NOTE — PROGRESS NOTES
CC:   Chief Complaint   Patient presents with    Right Hand - Pain     Pt is being seen for right hand thumb pain. Pt received a right thumb steroid injection today        James Mcclellan is a 59 y.o. male seen today for follow up of Pain of the Right Hand (Pt is being seen for right hand thumb pain. 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  He was last seen in November of 2024 by Dr. Reese in given a steroid injection of the 1st CMC joint.  Patient reports several months of pain relief following that injection.  Patient is here today when like a repeat injection.  He denies any new fall or injury to the thumb.        PAST MEDICAL HISTORY:   Past Medical History:   Diagnosis Date    Bell's palsy 10/15/2018    Left Side of Face    Degenerative cervical disc     Diabetic polyneuropathy     GERD (gastroesophageal reflux disease)     History of COVID-19 01/19/2021    Hyperlipidemia     Hypertension     Hypothyroidism     Mild nonproliferative diabetic retinopathy of both eyes without macular edema associated with type 2 diabetes mellitus 01/30/2023    Neck pain     EDGAR (obstructive sleep apnea)     Regular astigmatism of both eyes     From eye exam on 01/30/2023    Type 2 diabetes mellitus         PAST SURGICAL HISTORY:   Past Surgical History:   Procedure Laterality Date    ANTERIOR CRUCIATE LIGAMENT REPAIR      CHOLECYSTECTOMY      SINUS SURGERY          ALLERGIES: Review of patient's allergies indicates:  No Known Allergies     MEDICATIONS :  Current Medications[1]     SOCIAL HISTORY: Social History[2]     FAMILY HISTORY:   Family History   Problem Relation Name Age of Onset    Hypertension Mother      Diabetes Father      Heart disease Father      Hypertension Father      Diabetes Sister      Hypertension Sister      Hypertension Brother            PHYSICAL EXAM:      There  were no vitals filed for this visit.  Body mass index is 30.28 kg/m².    GENERAL: Well-developed, well-nourished male . The patient is alert, oriented and cooperative.    EXTREMITIES:  Right thumb positive Apley grind testing, tenderness over 1st CMC joint, good range of motion of the wrist, neurovascularly intact      RADIOGRAPHIC FINDINGS:   X-Ray Hand 3 View Right  Result Date: 6/5/2025  EXAMINATION: XR HAND COMPLETE 3 VIEW RIGHT CLINICAL HISTORY: Pain in right hand TECHNIQUE: PA, lateral, and oblique views of the right hand were performed. COMPARISON: 06/08/2023 FINDINGS: Bones are well mineralized.  Alignment is satisfactory.  Mild degenerative changes at the radiocarpal joint with moderate degenerative changes at the 1st carpometacarpal joint.  Mild degenerative changes also noted at some of the MCP joints and interphalangeal joints.  Ununited ossicle versus old trauma involving the ulnar styloid.  This is unchanged as compared to 06/08/2023.  No definite evidence of acute fracture, dislocation, or osseous destruction.  Small calcification in the soft tissues of the ring finger adjacent to the proximal phalanx along its ulnar aspect, unchanged.     No acute abnormality and no significant interval change.  Osteoarthritic changes as detailed above. Electronically signed by: Yasir Hickman MD Date:    06/05/2025 Time:    16:37    Patient Active Problem List    Diagnosis Date Noted    Chronic pain of right thumb 04/29/2025    Chronic pain syndrome 04/29/2025    Acute pansinusitis 04/29/2025    Abnormal US (ultrasound) of abdomen 02/19/2025    Hepatic steatosis 02/19/2025    Diarrhea 02/19/2025    Screening for malignant neoplasm of colon 02/19/2025    Vitamin D deficiency 05/14/2024    Dry eye 05/14/2024    Hypothyroidism 02/05/2024    Gastroesophageal reflux disease 02/05/2024    Right hand pain 09/11/2023    Hyperlipidemia associated with type 2 diabetes mellitus 08/18/2023    Type 2 diabetes mellitus     Nausea  and vomiting 06/01/2023    Chest pain 06/01/2023    Elevated TSH 06/01/2023    Lightheadedness 06/01/2023    Ethmoid sinusitis 06/01/2023    Essential hypertension, benign 05/11/2023    EDGAR (obstructive sleep apnea) 06/15/2022    Calcaneal spur 06/15/2022    Diabetic polyneuropathy associated with type 2 diabetes mellitus 06/15/2022    Bell's palsy 10/15/2018       IMPRESSION AND PLAN:  Arthritis CMC joint right thumb.  Injection given today, see procedural note for details.  Discussed with patient that injections can be repeated every 3-4 months as needed.  Follow up with the orthopedic clinic p.r.n..      No follow-ups on file.       Estela Garcia PA-C      (Subject to voice recognition error, transcription service not allowed)         [1]   Current Outpatient Medications:     alcohol swabs (ALCOHOL PREP PADS) PadM, Apply 1 each topically 3 (three) times daily., Disp: 300 each, Rfl: 1    amLODIPine (NORVASC) 5 MG tablet, Take 1 tablet (5 mg total) by mouth once daily., Disp: 90 tablet, Rfl: 1    aspirin 81 MG Chew, Take 1 tablet (81 mg total) by mouth once daily., Disp: 90 tablet, Rfl: 1    atorvastatin (LIPITOR) 80 MG tablet, Take 1 tablet (80 mg total) by mouth once daily., Disp: 90 tablet, Rfl: 1    blood-glucose sensor (FREESTYLE KORI 3 PLUS SENSOR) She, 1 each by Other route., Disp: , Rfl:     cholecalciferol, vitamin D3, (VITAMIN D3) 25 mcg (1,000 unit) capsule, Take 1 capsule (1,000 Units total) by mouth once daily., Disp: 90 capsule, Rfl: 1    cycloSPORINE (RESTASIS) 0.05 % ophthalmic emulsion, Place 1 drop into both eyes 2 (two) times daily., Disp: 180 each, Rfl: 3    empagliflozin (JARDIANCE) 25 mg tablet, Take 1 tablet (25 mg total) by mouth once daily., Disp: 90 tablet, Rfl: 1    FREESTYLE LANCETS 28 gauge lancets, Use to check blood glucose once daily., Disp: 100 each, Rfl: 1    FREESTYLE LITE STRIPS Strp, Use to check blood glucose once daily, Disp: 100 strip, Rfl: 1    gabapentin (NEURONTIN)  "300 MG capsule, Take 2 capsules (600 mg total) by mouth 3 (three) times daily., Disp: 270 capsule, Rfl: 1    insulin aspart U-100 (NOVOLOG) 100 unit/mL (3 mL) InPn pen, Inject 10 Units into the skin. Inject 10 units with each meal 3 times daily & if over 150 use Sliding scale, Disp: , Rfl:     insulin glargine U-100, Lantus, (LANTUS SOLOSTAR U-100 INSULIN) 100 unit/mL (3 mL) InPn pen, Inject 60 Units into the skin every evening. (Patient taking differently: Inject 40 Units into the skin every evening.), Disp: 54 mL, Rfl: 0    insulin glargine U-100, Lantus, (LANTUS SOLOSTAR U-100 INSULIN) 100 unit/mL (3 mL) InPn pen, Inject 40 Units into the skin every evening., Disp: , Rfl:     levothyroxine (SYNTHROID) 200 MCG tablet, Take 1 tablet (200 mcg total) by mouth before breakfast., Disp: 90 tablet, Rfl: 1    lisinopriL (PRINIVIL,ZESTRIL) 20 MG tablet, Take 1 tablet (20 mg total) by mouth once daily., Disp: 90 tablet, Rfl: 1    magnesium oxide 420 mg Tab, Take 1 tablet by mouth once daily., Disp: 90 each, Rfl: 1    omega-3 acid ethyl esters (LOVAZA) 1 gram capsule, Take 2 capsules (2 g total) by mouth 2 (two) times daily., Disp: 360 capsule, Rfl: 1    pantoprazole (PROTONIX) 40 MG tablet, Take 1 tablet (40 mg total) by mouth once daily., Disp: 90 tablet, Rfl: 3    pen needle, diabetic 31 gauge x 3/16" Ndle, Use to administer insulin once daily, Disp: 90 each, Rfl: 1    semaglutide (OZEMPIC) 0.25 mg or 0.5 mg (2 mg/3 mL) pen injector, Inject 0.25 mg into the skin every 7 days. (Patient not taking: Reported on 10/14/2024), Disp: 3 mL, Rfl: 1    XIIDRA 5 % Dpet, Place 1 drop into both eyes 2 (two) times daily., Disp: 60 each, Rfl: 0  [2]   Social History  Socioeconomic History    Marital status:      Spouse name: Marly    Number of children: 0   Occupational History     Employer: [employer]   Tobacco Use    Smoking status: Never     Passive exposure: Past    Smokeless tobacco: Never   Substance and Sexual Activity "    Alcohol use: Never     Comment: occasional    Drug use: Never    Sexual activity: Yes     Partners: Female   Social History Narrative    In home = patient and his wife Marly

## 2025-06-30 ENCOUNTER — TELEPHONE (OUTPATIENT)
Dept: FAMILY MEDICINE | Facility: CLINIC | Age: 60
End: 2025-06-30
Payer: OTHER GOVERNMENT

## 2025-06-30 NOTE — TELEPHONE ENCOUNTER
Copied from CRM #[id number]. Topic: Appointments - Appointment Scheduling  >> Jun 30, 2025  3:26 PM Med Assistant Tracee wrote:  Who Called: James Mcclellan    Caller is requesting a sooner appointment. Caller declined first available appointment listed below. Caller will not accept being placed on the waitlist and is requesting a message be sent to doctor.    When is the first available appointment?7-7  Options offered (Virtual Visit, Urgent Care):   Symptoms:hip pain       Preferred Method of Contact: Phone Call  Patient's Preferred Phone Number on File: [phone]   Best Call Back Number, if different:  Additional Information: walk in tomorrow after 10    Returned call to patient, patient reports left hip pain x 2 weeks, scheduled appt for 07/08/2025 at 1520, patient verbalized understanding.

## 2025-07-08 ENCOUNTER — OFFICE VISIT (OUTPATIENT)
Dept: FAMILY MEDICINE | Facility: CLINIC | Age: 60
End: 2025-07-08
Payer: OTHER GOVERNMENT

## 2025-07-08 VITALS
HEART RATE: 71 BPM | DIASTOLIC BLOOD PRESSURE: 86 MMHG | HEIGHT: 69 IN | OXYGEN SATURATION: 96 % | RESPIRATION RATE: 17 BRPM | TEMPERATURE: 98 F | SYSTOLIC BLOOD PRESSURE: 129 MMHG | BODY MASS INDEX: 30.63 KG/M2 | WEIGHT: 206.81 LBS

## 2025-07-08 DIAGNOSIS — G89.4 CHRONIC PAIN SYNDROME: ICD-10-CM

## 2025-07-08 DIAGNOSIS — E11.42 TYPE 2 DIABETES MELLITUS WITH DIABETIC POLYNEUROPATHY, WITH LONG-TERM CURRENT USE OF INSULIN: ICD-10-CM

## 2025-07-08 DIAGNOSIS — I10 ESSENTIAL HYPERTENSION, BENIGN: Primary | ICD-10-CM

## 2025-07-08 DIAGNOSIS — R11.2 NAUSEA AND VOMITING, UNSPECIFIED VOMITING TYPE: ICD-10-CM

## 2025-07-08 DIAGNOSIS — M25.552 LEFT HIP PAIN: ICD-10-CM

## 2025-07-08 DIAGNOSIS — Z79.4 TYPE 2 DIABETES MELLITUS WITH DIABETIC POLYNEUROPATHY, WITH LONG-TERM CURRENT USE OF INSULIN: ICD-10-CM

## 2025-07-08 PROCEDURE — 99213 OFFICE O/P EST LOW 20 MIN: CPT | Mod: 25,,,

## 2025-07-08 PROCEDURE — 96372 THER/PROPH/DIAG INJ SC/IM: CPT | Mod: ,,,

## 2025-07-08 RX ORDER — BLOOD-GLUCOSE METER
KIT MISCELLANEOUS
Qty: 100 STRIP | Refills: 1 | Status: SHIPPED | OUTPATIENT
Start: 2025-07-08

## 2025-07-08 RX ORDER — METHOCARBAMOL 750 MG/1
750 TABLET, FILM COATED ORAL 4 TIMES DAILY
Qty: 40 TABLET | Refills: 0 | Status: SHIPPED | OUTPATIENT
Start: 2025-07-08 | End: 2025-07-18

## 2025-07-08 RX ORDER — KETOROLAC TROMETHAMINE 30 MG/ML
30 INJECTION, SOLUTION INTRAMUSCULAR; INTRAVENOUS
Status: COMPLETED | OUTPATIENT
Start: 2025-07-08 | End: 2025-07-08

## 2025-07-08 RX ORDER — BLOOD-GLUCOSE SENSOR
1 EACH MISCELLANEOUS
Status: CANCELLED | OUTPATIENT
Start: 2025-07-08 | End: 2026-06-19

## 2025-07-08 RX ADMIN — KETOROLAC TROMETHAMINE 30 MG: 30 INJECTION, SOLUTION INTRAMUSCULAR; INTRAVENOUS at 09:07

## 2025-07-08 NOTE — ASSESSMENT & PLAN NOTE
Lab Results   Component Value Date    HGBA1C 8.4 (H) 05/27/2025    Freestyle refilled today. Discussed diet,  whole grains, fruits, vegetables, and low-fat dairy, while limiting added sugars, processed foods, and unhealthy fats and to increase exercise as tolerated. Pt is to check BS and keep log to bring to next visit. Make sure to take medication as prescribed. Will check lab work at next visit. Pt is currently seeing Dr. Garcia    Updated referral sent [facility] for [facility]

## 2025-07-08 NOTE — PROGRESS NOTES
ANDREA CARRANZA   [facility]  [address] [address] [address]  [phone]      PATIENT NAME: James Mcclellan  : 1965  DATE: 25  MRN: [id number]      Billing Provider: ANDREA CARRANZA  Level of Service: UT OFFICE/OUTPT VISIT, EST, LEVL III, 20-29 MIN  Patient PCP Information       None on File            Chief Complaint   Patient presents with    Hip Pain     Patient is James Mcclellan a 60 y/o male who reports he has been having left center hip pain x 2 weeks. Patient states that his pain level is about a 6 but by the end of the day it is a 10. Patient denies any injury. Patient states he has tried ice and heat and OTC creams with no relief.    Referral     Patient needs Updated  referral to his diabetic doctor, Gastro and here.    Health Maintenance     HIV Screening Patient Declines  Pneumococcal Vaccines (Age 50+)(2 of 2 - PCV) Patient Declines  Shingles Vaccine(1 of 2) Patient Declined  TETANUS VACCINE Patient Declined  Diabetes Urine Screening due Patient states he see's an endocrinologist  Diabetic Eye Exam Patient will reschedule he missed appointment  Lipid Panel Patient will do  Foot Exam Patient see's endocrinology             History of Present Illness      60 y/o male presents to clinic with complaints of left hip pain. Pt denies any radiating pain. Denies numbness or tingling or any new injury. Rates pain 6/10 today in clinic.     He reports unilateral back pain that started approximately 2 weeks ago, not involving the tailbone. The pain is present with palpation.  He is currently managing symptoms with OTC topical treatments including icy hot patches and roll-on. He is awaiting pain management referral.      ROS:  General: -fever, -chills, -fatigue, -weight gain, -weight loss  Eyes: -vision changes, -redness, -discharge  ENT: -ear pain, -nasal congestion, -sore throat  Cardiovascular: -chest pain, -palpitations, -lower extremity  edema  Respiratory: -cough, -shortness of breath  Gastrointestinal: -abdominal pain, -nausea, -vomiting, -diarrhea, -constipation, -blood in stool  Genitourinary: -dysuria, -hematuria, -frequency  Musculoskeletal: -joint pain, -muscle pain, +hip pain, -muscle weakness  Skin: -rash, -lesion  Neurological: -headache, -dizziness, -numbness, -tingling  Psychiatric: -anxiety, -depression, -sleep difficulty        Medical History    Past Medical History      Diagnosis Date Comments   Bell's palsy [G51.0] 10/15/2018 Left Side of Face   History of COVID-19 [Z86.16] 01/19/2021    Hypertension [I10]     GERD (gastroesophageal reflux disease) [K21.9]     Hypothyroidism [E03.9]     Neck pain [M54.2]     Degenerative cervical disc [M50.30]     Type 2 diabetes mellitus [E11.9]     Regular astigmatism of both eyes [H52.223]  From eye exam on 01/30/2023   Mild nonproliferative diabetic retinopathy of both eyes without macular edema associated with type 2 diabetes mellitus [E11.3293] 01/30/2023    Diabetic polyneuropathy [E11.42]     Hyperlipidemia [E78.5]     EDGAR (obstructive sleep apnea) [G47.33]       Past Surgical History       Laterality Date Comments   Anterior cruciate ligament repair [VUU054]      Cholecystectomy [SHX55]      Sinus surgery [DGC724]         Family History     Relation Status Problems (Age of Onset) - (Comment)        Mother  Hypertension   Father  Diabetes; Heart disease; Hypertension   Sister  Diabetes; Hypertension   Brother  Hypertension          Outpatient Medications Marked as Taking for the 7/8/25 encounter (Office Visit) with Eliecer Graham FNP   Medication Sig Dispense Refill    alcohol swabs (ALCOHOL PREP PADS) PadM Apply 1 each topically 3 (three) times daily. 300 each 1    amLODIPine (NORVASC) 5 MG tablet Take 1 tablet (5 mg total) by mouth once daily. 90 tablet 1    aspirin 81 MG Chew Take 1 tablet (81 mg total) by mouth once daily. 90 tablet 1    atorvastatin (LIPITOR) 80 MG tablet Take 1  "tablet (80 mg total) by mouth once daily. 90 tablet 1    blood-glucose sensor (FREESTYLE KORI 3 PLUS SENSOR) She 1 each by Other route.      cholecalciferol, vitamin D3, (VITAMIN D3) 25 mcg (1,000 unit) capsule Take 1 capsule (1,000 Units total) by mouth once daily. 90 capsule 1    cycloSPORINE (RESTASIS) 0.05 % ophthalmic emulsion Place 1 drop into both eyes 2 (two) times daily. 180 each 3    empagliflozin (JARDIANCE) 25 mg tablet Take 1 tablet (25 mg total) by mouth once daily. 90 tablet 1    FREESTYLE LANCETS 28 gauge lancets Use to check blood glucose once daily. 100 each 1    gabapentin (NEURONTIN) 300 MG capsule Take 2 capsules (600 mg total) by mouth 3 (three) times daily. 270 capsule 1    insulin aspart U-100 (NOVOLOG) 100 unit/mL (3 mL) InPn pen Inject 10 Units into the skin. Inject 10 units with each meal 3 times daily & if over 150 use Sliding scale      insulin glargine U-100, Lantus, (LANTUS SOLOSTAR U-100 INSULIN) 100 unit/mL (3 mL) InPn pen Inject 60 Units into the skin every evening. 54 mL 0    insulin glargine U-100, Lantus, (LANTUS SOLOSTAR U-100 INSULIN) 100 unit/mL (3 mL) InPn pen Inject 40 Units into the skin every evening.      levothyroxine (SYNTHROID) 200 MCG tablet Take 1 tablet (200 mcg total) by mouth before breakfast. 90 tablet 1    lisinopriL (PRINIVIL,ZESTRIL) 20 MG tablet Take 1 tablet (20 mg total) by mouth once daily. 90 tablet 1    magnesium oxide 420 mg Tab Take 1 tablet by mouth once daily. 90 each 1    omega-3 acid ethyl esters (LOVAZA) 1 gram capsule Take 2 capsules (2 g total) by mouth 2 (two) times daily. 360 capsule 1    pantoprazole (PROTONIX) 40 MG tablet Take 1 tablet (40 mg total) by mouth once daily. 90 tablet 3    pen needle, diabetic 31 gauge x 3/16" Ndle Use to administer insulin once daily 90 each 1    XIIDRA 5 % Dpet Place 1 drop into both eyes 2 (two) times daily. 60 each 0    [DISCONTINUED] FREESTYLE LITE STRIPS Strp Use to check blood glucose once daily 100 " strip 1     Current Facility-Administered Medications for the 7/8/25 encounter (Office Visit) with Eliecer Graham FNP   Medication Dose Route Frequency Provider Last Rate Last Admin    [COMPLETED] ketorolac injection 30 mg  30 mg Intramuscular 1 time in Clinic/HOD Eliecer Graham ANDREA   30 mg at 07/08/25 0957     Vitals:    07/08/25 0930   BP: 129/86   Pulse: 71   Resp: 17   Temp: 97.8 °F (36.6 °C)         Physical Exam  Vitals and nursing note reviewed.   Constitutional:       General: He is awake.      Appearance: Normal appearance. He is obese.   HENT:      Head: Normocephalic.      Right Ear: Tympanic membrane, ear canal and external ear normal.      Left Ear: Tympanic membrane, ear canal and external ear normal.      Nose: Nose normal.      Mouth/Throat:      Lips: Pink.      Mouth: Mucous membranes are moist.      Pharynx: Oropharynx is clear. Uvula midline.   Cardiovascular:      Rate and Rhythm: Normal rate and regular rhythm.      Heart sounds: Normal heart sounds, S1 normal and S2 normal. Heart sounds not distant. No murmur heard.     No friction rub. No gallop. No S3 or S4 sounds.   Pulmonary:      Effort: Pulmonary effort is normal. No respiratory distress.      Breath sounds: Normal breath sounds. No decreased breath sounds, wheezing, rhonchi or rales.   Abdominal:      General: Bowel sounds are normal.      Palpations: Abdomen is soft.      Tenderness: There is no abdominal tenderness.   Musculoskeletal:      Cervical back: Normal range of motion.      Lumbar back: Normal.      Left hip: Tenderness present. Normal range of motion. Normal strength.      Right lower leg: No edema.      Left lower leg: No edema.        Legs:    Skin:     General: Skin is warm.      Capillary Refill: Capillary refill takes less than 2 seconds.   Neurological:      Mental Status: He is alert and oriented to person, place, and time.   Psychiatric:         Thought Content: Thought content does not include homicidal or  suicidal ideation. Thought content does not include homicidal or suicidal plan.               Problem List Items Addressed This Visit       Essential hypertension, benign - Primary    Current Assessment & Plan   BP controlled at present. Monitor BP daily and keep BP daily log to bring to next visit. Exercise at least 5 times a week. Keep scheduled appts. RTC sooner if BP is staying >140/90. Dash diet.    DASH- includes foods rich in K+, calcium and Magnesium.The diet limits foods high in sodium, saturated fats and added sugars. This is a flexible balanced eating plan that helps create heart healthy eating style for life. Diet is rich in vegetable, whole grains and fruits. It includes fat free or low fat dairy products, fish, poultry, nuts. Chose foods high in K+, calcium, magnesium, fiber, protein, and low in saturated fats and sodium.          Nausea and vomiting    Current Assessment & Plan   [facility].          Relevant Orders    Ambulatory referral/consult to Gastroenterology    Type 2 diabetes mellitus    Current Assessment & Plan   Lab Results   Component Value Date    HGBA1C 8.4 (H) 05/27/2025    Freestyle refilled today. Discussed diet,  whole grains, fruits, vegetables, and low-fat dairy, while limiting added sugars, processed foods, and unhealthy fats and to increase exercise as tolerated. Pt is to check BS and keep log to bring to next visit. Make sure to take medication as prescribed. Will check lab work at next visit. Pt is currently seeing Dr. Garcia    Updated [facility] for [facility]         Relevant Medications    FREESTYLE LITE STRIPS Strp    Other Relevant Orders    Ambulatory referral/consult to Endocrinology    Chronic pain syndrome    Current Assessment & Plan   New [facility] sent [facility]         Left hip pain    Current Assessment & Plan   Toradol IM today. Robaxin RX. Medication instructions given with understanding  voiced. Pt wants to wait on Xray. Will call with worsening, new or persistent pain. Pt waited 15 mins in clinic after IM. Pt tolerated well. Pt left clinic ambulatory and in stable condition         Relevant Medications    ketorolac injection 30 mg (Completed)    methocarbamoL (ROBAXIN) 750 MG Tab     Assessment & Plan        LEFT HIP PAIN:  - Assessed the patient's left hip pain, which started a few weeks ago, without radiating pain, numbness or tingling.  - Denies any known injury. Pt does have chronic back pain with flare ups occasionally  -No edema noted. TTP left SI joint on exam. Normal strength in bilateral lower extremities.   - Determined Toradol injection appropriate for pain management, given patient's inability to take steroids, and administered during visit. Will also prescribe Robaxin for home.       FOLLOW-UP   -Pt to return to clinic with worsening, new or persistent pain for further evaluation if current treatment is ineffective. Pt is agreeable and voiced understanding    Future Appointments   Date Time Provider Department Center   6/1/2026  8:00 AM [facility] GI ROOM 03 [facility]        This note was generated with the assistance of ambient listening technology. Verbal consent was obtained by the patient and accompanying visitor(s) for the recording of patient appointment to facilitate this note. I attest to having reviewed and edited the generated note for accuracy, though some syntax or spelling errors may persist. Please contact the author of this note for any clarification.     Signature:  VON ALSTON STEPHEN  [facility] [address] [place] [address]  [phone]    Date of encounter: 7/8/25

## 2025-07-08 NOTE — ASSESSMENT & PLAN NOTE
Toradol IM today. Robaxin RX. Medication instructions given with understanding voiced. Pt wants to wait on Xray. Will call with worsening, new or persistent pain. Pt waited 15 mins in clinic after IM. Pt tolerated well. Pt left clinic ambulatory and in stable condition

## 2025-07-14 ENCOUNTER — TELEPHONE (OUTPATIENT)
Dept: FAMILY MEDICINE | Facility: CLINIC | Age: 60
End: 2025-07-14
Payer: OTHER GOVERNMENT

## 2025-07-14 DIAGNOSIS — G89.4 CHRONIC PAIN SYNDROME: ICD-10-CM

## 2025-07-14 DIAGNOSIS — M54.9 BACK PAIN, UNSPECIFIED BACK LOCATION, UNSPECIFIED BACK PAIN LATERALITY, UNSPECIFIED CHRONICITY: Primary | ICD-10-CM

## 2025-07-14 NOTE — TELEPHONE ENCOUNTER
----- Message from Lillian sent at 7/14/2025 10:55 AM CDT -----  [facility]ed [facility] finally got information on the authorization the patient will have to have an MRI and Xray done before he can be approved to go to pain management.

## 2025-07-14 NOTE — TELEPHONE ENCOUNTER
Left voice mail for patient to call clinic. Received updated information for referral to pain treatment.Patient must have plain xrays and MRI prior to referral to pain management.

## 2025-07-23 ENCOUNTER — OFFICE VISIT (OUTPATIENT)
Dept: FAMILY MEDICINE | Facility: CLINIC | Age: 60
End: 2025-07-23
Payer: OTHER GOVERNMENT

## 2025-07-23 VITALS
HEART RATE: 72 BPM | OXYGEN SATURATION: 96 % | RESPIRATION RATE: 18 BRPM | HEIGHT: 69 IN | WEIGHT: 208.19 LBS | BODY MASS INDEX: 30.83 KG/M2 | DIASTOLIC BLOOD PRESSURE: 72 MMHG | SYSTOLIC BLOOD PRESSURE: 100 MMHG | TEMPERATURE: 98 F

## 2025-07-23 DIAGNOSIS — R19.7 BLOODY DIARRHEA: Primary | ICD-10-CM

## 2025-07-23 DIAGNOSIS — R19.7 DIARRHEA, UNSPECIFIED TYPE: ICD-10-CM

## 2025-07-23 LAB
BASOPHILS # BLD AUTO: 0.02 K/UL (ref 0–0.2)
BASOPHILS NFR BLD AUTO: 0.3 % (ref 0–1)
DIFFERENTIAL METHOD BLD: ABNORMAL
EOSINOPHIL # BLD AUTO: 0.11 K/UL (ref 0–0.5)
EOSINOPHIL NFR BLD AUTO: 1.4 % (ref 1–4)
ERYTHROCYTE [DISTWIDTH] IN BLOOD BY AUTOMATED COUNT: 13.7 % (ref 11.5–14.5)
HCT VFR BLD AUTO: 48.5 % (ref 40–54)
HGB BLD-MCNC: 15.7 G/DL (ref 13.5–18)
IMM GRANULOCYTES # BLD AUTO: 0.03 K/UL (ref 0–0.04)
IMM GRANULOCYTES NFR BLD: 0.4 % (ref 0–0.4)
LYMPHOCYTES # BLD AUTO: 1.77 K/UL (ref 1–4.8)
LYMPHOCYTES NFR BLD AUTO: 22.3 % (ref 27–41)
MCH RBC QN AUTO: 29 PG (ref 27–31)
MCHC RBC AUTO-ENTMCNC: 32.4 G/DL (ref 32–36)
MCV RBC AUTO: 89.6 FL (ref 80–96)
MONOCYTES # BLD AUTO: 0.59 K/UL (ref 0–0.8)
MONOCYTES NFR BLD AUTO: 7.4 % (ref 2–6)
MPC BLD CALC-MCNC: 13 FL (ref 9.4–12.4)
NEUTROPHILS # BLD AUTO: 5.4 K/UL (ref 1.8–7.7)
NEUTROPHILS NFR BLD AUTO: 68.2 % (ref 53–65)
NRBC # BLD AUTO: 0 X10E3/UL
NRBC, AUTO (.00): 0 %
PLATELET # BLD AUTO: 132 K/UL (ref 150–400)
RBC # BLD AUTO: 5.41 M/UL (ref 4.6–6.2)
WBC # BLD AUTO: 7.92 K/UL (ref 4.5–11)

## 2025-07-23 PROCEDURE — 99213 OFFICE O/P EST LOW 20 MIN: CPT | Mod: ,,,

## 2025-07-23 PROCEDURE — 85025 COMPLETE CBC W/AUTO DIFF WBC: CPT | Mod: ,,, | Performed by: CLINICAL MEDICAL LABORATORY

## 2025-07-23 RX ORDER — INSULIN ASPART 100 [IU]/ML
12 INJECTION, SOLUTION INTRAVENOUS; SUBCUTANEOUS
COMMUNITY
Start: 2025-05-27

## 2025-07-23 NOTE — PROGRESS NOTES
"   VON ALSTON, ANDREA   [facility]  [address]  [place]  [address]        PATIENT NAME: James Mcclellan  : 1965  DATE: 25  MRN: [id number]        Reason for Visit / Chief Complaint: Diarrhea (Patient is a 59 year old male who presents to the clinic related to diarrhea since Monday morning.  Patient states, "now bloody diarrhea".  Has taken a rx  he had on hand lomotil, not helping.  Denies any other symptoms at current time. Patient went on a cruise last week, wife is not sick. ), Abdominal Cramping (Mild abdominal cramping since Monday ), and Health Maintenance (HIV Screening--declines/Pneumococcal Vaccines (Age 50+)(2 of 2 - PCV) due on 2015-declines /Shingles Vaccine(1 of 2) declines /TETANUS VACCINE due on 2018-declines/Diabetes Urine Screening due on 2025/Diabetic Eye Exam due on 2025-patient will schedule/Lipid Panel due on 2025--not fasting this am /Foot Exam due on 2025////)       Update PCP  Update Chief Complaint         History of Present Illness / Problem Focused Workflow     58 y/o male presents to clinic with complaints of diarrhea since Monday morning. Pt states just returned from cruise on . Pt states he has been having 7-8 episodes diarrhea a day since it started with no improvement. States bloody diarrhea started yesterday. He is having mild lower abdominal pain. Denies any other symptoms, no fever, chills, N/V, SOB.He has taken Lomotil since Tuesday with no relief.     Review of Systems     Review of Systems   Constitutional:  Negative for chills, fatigue and fever.   HENT:  Negative for nasal congestion, ear discharge, ear pain, rhinorrhea, sinus pressure/congestion and sore throat.    Respiratory:  Negative for cough, chest tightness, shortness of breath, wheezing and stridor.    Cardiovascular:  Negative for palpitations and claudication.   Gastrointestinal:  Positive for abdominal pain, blood in stool and diarrhea. " Negative for constipation, nausea, vomiting and reflux.   Genitourinary:  Negative for dysuria, flank pain, frequency, hematuria and urgency.   Musculoskeletal:  Negative for myalgias.   Neurological:  Negative for dizziness, weakness, light-headedness and headaches.   Psychiatric/Behavioral:  Negative for suicidal ideas.         Medical / Social / Family History     Past Medical History:   Diagnosis Date    Bell's palsy 10/15/2018    Left Side of Face    Degenerative cervical disc     Diabetic polyneuropathy     GERD (gastroesophageal reflux disease)     History of COVID-19 01/19/2021    Hyperlipidemia     Hypertension     Hypothyroidism     Mild nonproliferative diabetic retinopathy of both eyes without macular edema associated with type 2 diabetes mellitus 01/30/2023    Neck pain     EDGAR (obstructive sleep apnea)     Regular astigmatism of both eyes     From eye exam on 01/30/2023    Type 2 diabetes mellitus        Past Surgical History:   Procedure Laterality Date    ANTERIOR CRUCIATE LIGAMENT REPAIR      CHOLECYSTECTOMY      SINUS SURGERY         Social History    reports that he has never smoked. He has been exposed to tobacco smoke. He has never used smokeless tobacco. He reports current alcohol use. He reports that he does not use drugs.    Family History  's family history includes Diabetes in his father and sister; Heart disease in his father; Hypertension in his brother, father, mother, and sister.    Medications and Allergies     Medications  Outpatient Medications Marked as Taking for the 7/23/25 encounter (Office Visit) with Eliecer Graham FNP   Medication Sig Dispense Refill    alcohol swabs (ALCOHOL PREP PADS) PadM Apply 1 each topically 3 (three) times daily. 300 each 1    amLODIPine (NORVASC) 5 MG tablet Take 1 tablet (5 mg total) by mouth once daily. 90 tablet 1    aspirin 81 MG Chew Take 1 tablet (81 mg total) by mouth once daily. 90 tablet 1    atorvastatin (LIPITOR) 80 MG tablet Take 1  "tablet (80 mg total) by mouth once daily. 90 tablet 1    blood-glucose sensor (FREESTYLE KORI 3 PLUS SENSOR) She 1 each by Other route.      cholecalciferol, vitamin D3, (VITAMIN D3) 25 mcg (1,000 unit) capsule Take 1 capsule (1,000 Units total) by mouth once daily. 90 capsule 1    cycloSPORINE (RESTASIS) 0.05 % ophthalmic emulsion Place 1 drop into both eyes 2 (two) times daily. 180 each 3    empagliflozin (JARDIANCE) 25 mg tablet Take 1 tablet (25 mg total) by mouth once daily. 90 tablet 1    FREESTYLE LANCETS 28 gauge lancets Use to check blood glucose once daily. 100 each 1    FREESTYLE LITE STRIPS Strp Use to check blood glucose once daily 100 strip 1    gabapentin (NEURONTIN) 300 MG capsule Take 2 capsules (600 mg total) by mouth 3 (three) times daily. 270 capsule 1    insulin aspart U-100 (NOVOLOG) 100 unit/mL (3 mL) InPn pen Inject 12 Units into the skin. Inject twelve units under the skin in the morning and twelve units at noon and twelve units in the evening. Inject before meals. Plus sliding scale.  MDD 66 units      insulin glargine U-100, Lantus, (LANTUS SOLOSTAR U-100 INSULIN) 100 unit/mL (3 mL) InPn pen Inject 40 Units into the skin every evening.      levothyroxine (SYNTHROID) 200 MCG tablet Take 1 tablet (200 mcg total) by mouth before breakfast. 90 tablet 1    lisinopriL (PRINIVIL,ZESTRIL) 20 MG tablet Take 1 tablet (20 mg total) by mouth once daily. 90 tablet 1    magnesium oxide 420 mg Tab Take 1 tablet by mouth once daily. 90 each 1    omega-3 acid ethyl esters (LOVAZA) 1 gram capsule Take 2 capsules (2 g total) by mouth 2 (two) times daily. 360 capsule 1    pantoprazole (PROTONIX) 40 MG tablet Take 1 tablet (40 mg total) by mouth once daily. 90 tablet 3    pen needle, diabetic 31 gauge x 3/16" Ndle Use to administer insulin once daily 90 each 1    XIIDRA 5 % Dpet Place 1 drop into both eyes 2 (two) times daily. 60 each 0       Allergies  Review of patient's allergies indicates:  No Known " "Allergies    Physical Examination   /72 (BP Location: Right arm, Patient Position: Sitting)   Pulse 72   Temp 97.7 °F (36.5 °C) (Oral)   Resp 18   Ht 5' 9" (1.753 m)   Wt 94.4 kg (208 lb 3.2 oz)   SpO2 96%   BMI 30.75 kg/m²    Physical Exam  Vitals and nursing note reviewed.   Constitutional:       General: He is awake.      Appearance: Normal appearance.   HENT:      Head: Normocephalic.      Right Ear: Tympanic membrane, ear canal and external ear normal.      Left Ear: Tympanic membrane, ear canal and external ear normal.      Nose: Nose normal.      Mouth/Throat:      Lips: Pink.      Mouth: Mucous membranes are moist.      Pharynx: Oropharynx is clear. Uvula midline.   Cardiovascular:      Rate and Rhythm: Normal rate and regular rhythm.      Heart sounds: Normal heart sounds, S1 normal and S2 normal. Heart sounds not distant. No murmur heard.     No friction rub. No gallop. No S3 or S4 sounds.   Pulmonary:      Effort: Pulmonary effort is normal. No respiratory distress.      Breath sounds: Normal breath sounds. No decreased breath sounds, wheezing, rhonchi or rales.   Abdominal:      General: Bowel sounds are normal.      Palpations: Abdomen is soft.      Tenderness: There is no abdominal tenderness. There is no right CVA tenderness, left CVA tenderness, guarding or rebound.   Musculoskeletal:      Cervical back: Normal range of motion.      Right lower leg: No edema.      Left lower leg: No edema.   Skin:     General: Skin is warm.      Capillary Refill: Capillary refill takes less than 2 seconds.   Neurological:      Mental Status: He is alert and oriented to person, place, and time.   Psychiatric:         Thought Content: Thought content does not include homicidal or suicidal ideation. Thought content does not include homicidal or suicidal plan.          Assessment and Plan (including Health Maintenance)      Problem List  Smart Sets  Document Outside HM   :    Plan: Pt unable to get stool " sample   He refuses to go to ER  Appointment made with GI for tomorrow at 1040          Problem List Items Addressed This Visit       Bloody diarrhea - Primary    Current Assessment & Plan   Diarrhea started Monday morning with mild abdominal cramping. Just got back from cruise Sunday night. Bright red blood with diarrhea started yesterday  Denies any other symptoms, refused Covid test  Pt has taken Lomotil with no relief.  He is not able to give stool samples due to blood  He refuses to go to ER  Referral for GI placed. Appointment made for tomorrow at 1040.           Relevant Orders    Ambulatory referral/consult to Gastroenterology           Future Appointments   Date Time Provider Department Center   7/24/2025 10:40 AM Ledy Howard FNP [facility]   6/1/2026  8:00 AM [facility] GI ROOM 03 [facility]            Signature:      ANDREA CARRANZA   [facility]  [address]  [place]  [address]       Date of encounter: 7/23/25

## 2025-07-23 NOTE — ASSESSMENT & PLAN NOTE
Diarrhea started Monday morning with mild abdominal cramping. Just got back from cruise Sunday night. Bright red blood with diarrhea started yesterday  Denies any other symptoms, refused Covid test  Pt has taken Lomotil with no relief.  He is not able to give stool samples due to blood  He refuses to go to ER  Referral for GI placed. Appointment made for tomorrow at 1040.

## 2025-07-23 NOTE — LETTER
July 23, 2025      [facility]  [address] [place] [address] [place] [address]  Phone: [phone]  Fax: [phone]       Patient: James Mcclellan   YOB: 1965  Date of Visit: 07/23/2025    To Whom It May Concern:    Solange Mcclellan  was at [facility] on 07/23/2025. The patient may return to work/school on 07/26/2025 with no restrictions. If you have any questions or concerns, or if I can be of further assistance, please do not hesitate to contact me.    Sincerely,    ANDREA Robertson

## 2025-07-24 ENCOUNTER — RESULTS FOLLOW-UP (OUTPATIENT)
Dept: FAMILY MEDICINE | Facility: CLINIC | Age: 60
End: 2025-07-24
Payer: OTHER GOVERNMENT

## 2025-07-24 ENCOUNTER — TELEPHONE (OUTPATIENT)
Dept: FAMILY MEDICINE | Facility: CLINIC | Age: 60
End: 2025-07-24
Payer: OTHER GOVERNMENT

## 2025-07-24 NOTE — TELEPHONE ENCOUNTER
Attempted to call patient about results, no answer, left message.  CBC is stable. WBC and blood count is good     Attempted to call patient to review lab results, picked up and hung up.     Called wife's number, spoke with patient, reviewed results as stated above.  Patient voiced understanding.

## 2025-07-30 NOTE — TELEPHONE ENCOUNTER
Spoke with patient about [facility] request for patient to have plain film xrays and Mri prior to approval for referral to pain management. Patient will return to clinic for xrays or go to [facility] in [place].Also spoke with patient about his need to be evaluated at the ER as patient continues to pass blood from rectum.

## 2025-07-30 NOTE — TELEPHONE ENCOUNTER
"Notified patient of CBC results from 25. I asked patient if he was still having bloody stools. He denied any diarrhea, but still passing blood from rectum. Advised patient to report to the ER. Pt stated " I ain't  yet,so I will just stay around here." Explained to patient it could be something serious if he is still having blood from rectum. He had GI appt scheduled last week,but had to change due to no approval from the [facility]. This appt has been rescheduled.  "

## 2025-08-05 ENCOUNTER — OFFICE VISIT (OUTPATIENT)
Dept: GASTROENTEROLOGY | Facility: CLINIC | Age: 60
End: 2025-08-05
Payer: OTHER GOVERNMENT

## 2025-08-05 ENCOUNTER — HOSPITAL ENCOUNTER (OUTPATIENT)
Dept: RADIOLOGY | Facility: HOSPITAL | Age: 60
Discharge: HOME | End: 2025-08-05
Payer: OTHER GOVERNMENT

## 2025-08-05 VITALS
WEIGHT: 208.63 LBS | OXYGEN SATURATION: 97 % | DIASTOLIC BLOOD PRESSURE: 77 MMHG | BODY MASS INDEX: 30.9 KG/M2 | SYSTOLIC BLOOD PRESSURE: 131 MMHG | HEART RATE: 70 BPM | HEIGHT: 69 IN

## 2025-08-05 DIAGNOSIS — G89.4 CHRONIC PAIN SYNDROME: ICD-10-CM

## 2025-08-05 DIAGNOSIS — R19.8 ALTERNATING CONSTIPATION AND DIARRHEA: Primary | ICD-10-CM

## 2025-08-05 DIAGNOSIS — M54.9 BACK PAIN, UNSPECIFIED BACK LOCATION, UNSPECIFIED BACK PAIN LATERALITY, UNSPECIFIED CHRONICITY: ICD-10-CM

## 2025-08-05 DIAGNOSIS — R19.7 DIARRHEA, UNSPECIFIED TYPE: ICD-10-CM

## 2025-08-05 DIAGNOSIS — K74.60 HEPATIC CIRRHOSIS, UNSPECIFIED HEPATIC CIRRHOSIS TYPE, UNSPECIFIED WHETHER ASCITES PRESENT: ICD-10-CM

## 2025-08-05 DIAGNOSIS — R19.7 DIARRHEA OF PRESUMED INFECTIOUS ORIGIN: ICD-10-CM

## 2025-08-05 DIAGNOSIS — K76.0 FATTY LIVER: ICD-10-CM

## 2025-08-05 DIAGNOSIS — R19.7 DIARRHEA, UNSPECIFIED: Primary | ICD-10-CM

## 2025-08-05 PROCEDURE — 99214 OFFICE O/P EST MOD 30 MIN: CPT | Mod: S$PBB,,, | Performed by: NURSE PRACTITIONER

## 2025-08-05 PROCEDURE — 85610 PROTHROMBIN TIME: CPT | Performed by: NURSE PRACTITIONER

## 2025-08-05 PROCEDURE — 99999 PR PBB SHADOW E&M-EST. PATIENT-LVL V: CPT | Mod: PBBFAC,,, | Performed by: NURSE PRACTITIONER

## 2025-08-05 PROCEDURE — 72100 X-RAY EXAM L-S SPINE 2/3 VWS: CPT | Mod: TC

## 2025-08-05 PROCEDURE — 99215 OFFICE O/P EST HI 40 MIN: CPT | Mod: PBBFAC,25 | Performed by: NURSE PRACTITIONER

## 2025-08-05 PROCEDURE — 72100 X-RAY EXAM L-S SPINE 2/3 VWS: CPT | Mod: 26,,, | Performed by: INTERNAL MEDICINE

## 2025-08-05 NOTE — PROGRESS NOTES
James Mcclellan is a 59 y.o. male here for No chief complaint on file.        PCP: No primary care provider on file.  Referring Provider: Eliecer Leon, Jesus  [address]  [place] [address]     HPI:  Presents with alternating diarrhea and constipation. Patient was last seen by Dr. Carrillo and JESUS Ambrosio. Patient wishes to transfer care. Patient recently had an episode of diarrhea with rectal bleeding.  The patient had traveled on a cruise to [place] prior to this recent illness.  The patient's wife also had the same symptoms.  Diarrhea has improved as well as rectal bleeding currently.  Patient did have a recent colonoscopy on 06/02/2025 per Dr. Carrillo.  According to report poor prep was noted, tubular adenoma x2 removed, right colon biopsy was negative for any inflammation or microscopic colitis.  Recommendation to repeat colonoscopy in 1 year due to poor prep.  Stool studies were ordered on 07/23/2025 by primary care but patient has not turned these specimens in.  He is advised that we will still wish to collect stool studies for further evaluation.  He reports alternating diarrhea and constipation.  States that he was compliant and drank the gal of GoLYTELY but despite proper prep he did have retained stool.  Advised that this is more consistent with constipation and possible overflow diarrhea.Also discussed the differential of IBS.  He did have an MRI abdomen on 03/05/2025, report reviewed, Hepatomegaly and morphologic changes of the liver suggestive for chronic liver disease/cirrhosis.  No suspicious liver lesion or evidence of diffuse steatosis.  Mild splenomegaly.  He did have a liver elastography also that has discordant results with a score of F0 to F1.  We did discuss that this is suspicious for cirrhosis of the liver.  We will check autoimmune liver labs today.  States that he measuring alcohol once per year and only on special occasions.  States that he does remember the possibility of  being told that he had fatty liver many years ago.  He does report increased heartburn and reflux symptoms that is intermittent.  Also discussed EGD for further evaluation of GERD as well as variceal screening due to suspicion of cirrhosis on MRI.  The patient agrees to schedule.          ROS:  Review of Systems   Constitutional:  Negative for activity change, appetite change, fatigue, fever and unexpected weight change.   HENT:  Negative for trouble swallowing.    Gastrointestinal:  Positive for constipation, diarrhea and reflux. Negative for abdominal distention, abdominal pain, anal bleeding, blood in stool, change in bowel habit, nausea and vomiting.   Integumentary:  Negative for color change.          PMHX:  has a past medical history of Bell's palsy (10/15/2018), Degenerative cervical disc, Diabetic polyneuropathy, GERD (gastroesophageal reflux disease), History of COVID-19 (01/19/2021), Hyperlipidemia, Hypertension, Hypothyroidism, Mild nonproliferative diabetic retinopathy of both eyes without macular edema associated with type 2 diabetes mellitus (01/30/2023), Neck pain, EDGAR (obstructive sleep apnea), Regular astigmatism of both eyes, and Type 2 diabetes mellitus.    PSHX:  has a past surgical history that includes Anterior cruciate ligament repair; Cholecystectomy; and Sinus surgery.    PFHX: family history includes Diabetes in his father and sister; Heart disease in his father; Hypertension in his brother, father, mother, and sister.    PSlHX:  reports that he has never smoked. He has been exposed to tobacco smoke. He has never used smokeless tobacco. He reports current alcohol use. He reports that he does not use drugs.        Review of patient's allergies indicates:  No Known Allergies    Medication List with Changes/Refills   Current Medications    ALCOHOL SWABS (ALCOHOL PREP PADS) PADM    Apply 1 each topically 3 (three) times daily.    AMLODIPINE (NORVASC) 5 MG TABLET    Take 1 tablet (5 mg total)  by mouth once daily.    ASPIRIN 81 MG CHEW    Take 1 tablet (81 mg total) by mouth once daily.    ATORVASTATIN (LIPITOR) 80 MG TABLET    Take 1 tablet (80 mg total) by mouth once daily.    BLOOD-GLUCOSE SENSOR (FREESTYLE KORI 3 PLUS SENSOR) JUANA    1 each by Other route.    CHOLECALCIFEROL, VITAMIN D3, (VITAMIN D3) 25 MCG (1,000 UNIT) CAPSULE    Take 1 capsule (1,000 Units total) by mouth once daily.    CYCLOSPORINE (RESTASIS) 0.05 % OPHTHALMIC EMULSION    Place 1 drop into both eyes 2 (two) times daily.    EMPAGLIFLOZIN (JARDIANCE) 25 MG TABLET    Take 1 tablet (25 mg total) by mouth once daily.    FREESTYLE LANCETS 28 GAUGE LANCETS    Use to check blood glucose once daily.    FREESTYLE LITE STRIPS STRP    Use to check blood glucose once daily    GABAPENTIN (NEURONTIN) 300 MG CAPSULE    Take 2 capsules (600 mg total) by mouth 3 (three) times daily.    INSULIN ASPART U-100 (NOVOLOG) 100 UNIT/ML (3 ML) INPN PEN    Inject 10 Units into the skin. Inject 10 units with each meal 3 times daily & if over 150 use Sliding scale    INSULIN ASPART U-100 (NOVOLOG) 100 UNIT/ML (3 ML) INPN PEN    Inject 12 Units into the skin. Inject twelve units under the skin in the morning and twelve units at noon and twelve units in the evening. Inject before meals. Plus sliding scale.  MDD 66 units    INSULIN GLARGINE U-100, LANTUS, (LANTUS SOLOSTAR U-100 INSULIN) 100 UNIT/ML (3 ML) INPN PEN    Inject 40 Units into the skin every evening.    LEVOTHYROXINE (SYNTHROID) 200 MCG TABLET    Take 1 tablet (200 mcg total) by mouth before breakfast.    LISINOPRIL (PRINIVIL,ZESTRIL) 20 MG TABLET    Take 1 tablet (20 mg total) by mouth once daily.    MAGNESIUM OXIDE 420 MG TAB    Take 1 tablet by mouth once daily.    OMEGA-3 ACID ETHYL ESTERS (LOVAZA) 1 GRAM CAPSULE    Take 2 capsules (2 g total) by mouth 2 (two) times daily.    PANTOPRAZOLE (PROTONIX) 40 MG TABLET    Take 1 tablet (40 mg total) by mouth once daily.    PEN NEEDLE, DIABETIC 31 GAUGE X  "3/16" NDLE    Use to administer insulin once daily    SEMAGLUTIDE (OZEMPIC) 0.25 MG OR 0.5 MG (2 MG/3 ML) PEN INJECTOR    Inject 0.25 mg into the skin every 7 days.    XIIDRA 5 % DPET    Place 1 drop into both eyes 2 (two) times daily.        Objective Findings:  Vital Signs:  /77   Pulse 70   Ht 5' 9" (1.753 m)   Wt 94.6 kg (208 lb 9.6 oz)   SpO2 97%   BMI 30.80 kg/m²  Body mass index is 30.8 kg/m².    Physical Exam:  Physical Exam  Vitals and nursing note reviewed.   Constitutional:       General: He is not in acute distress.     Appearance: Normal appearance.   HENT:      Mouth/Throat:      Mouth: Mucous membranes are moist.   Cardiovascular:      Rate and Rhythm: Normal rate.   Pulmonary:      Effort: Pulmonary effort is normal.   Abdominal:      General: Bowel sounds are normal. There is no distension.      Palpations: Abdomen is soft. There is no mass.      Tenderness: There is no abdominal tenderness.   Skin:     General: Skin is warm and dry.      Coloration: Skin is not jaundiced or pale.   Neurological:      Mental Status: He is alert and oriented to person, place, and time.   Psychiatric:         Mood and Affect: Mood normal.          Labs:  Lab Results   Component Value Date    WBC 7.92 07/23/2025    HGB 15.7 07/23/2025    HCT 48.5 07/23/2025    MCV 89.6 07/23/2025    RDW 13.7 07/23/2025     (L) 07/23/2025    LYMPH 22.3 (L) 07/23/2025    LYMPH 1.77 07/23/2025    MONO 7.4 (H) 07/23/2025    EOS 0.11 07/23/2025    BASO 0.02 07/23/2025     Lab Results   Component Value Date     02/19/2025    K 3.9 02/19/2025     02/19/2025    CO2 28 02/19/2025     (H) 02/19/2025    BUN 13 02/19/2025    CREATININE 0.98 02/19/2025    CALCIUM 9.5 02/19/2025    PROT 7.7 02/19/2025    ALBUMIN 3.9 02/19/2025    BILITOT 1.7 (H) 02/19/2025    ALKPHOS 78 02/19/2025    AST 35 (H) 02/19/2025    ALT 41 02/19/2025         Imaging: No results found.      Assessment:  James Mcclellan is a 59 y.o. male " "here with:  1. Hepatic cirrhosis, unspecified hepatic cirrhosis type, unspecified whether ascites present    2. Fatty liver    3. Alternating constipation and diarrhea    4. Diarrhea, unspecified type    5. Diarrhea, unspecified          Recommendations:  1. Stool studies  2. Liver labs  3. Schedule EGD  4. Limit salt to 2000 mg per day  Avoid alcohol  Limit tylenol to 2000 mg per day  Avoid raw seafood  Protein intake of 1.2-1.5 grams/kg daily  MELD score will be calculated based on labs today    Portions of this note may have been created with voice recognition software.  Occasional wrong word or "sound a like substitutions may have occurred due to inherent limitations of voice recognition software.  Please read the note carefully and recognize using contexts, where substitutions have occurred.    Diagnosis, risks, benefits, and side effects of any medications and treatment plan were discussed with the patient.  All questions were answered to the satisfaction of the patient, and patient verbalized understanding and agreement to the treatment plan.          Follow up in about 2 months (around 10/5/2025).      Order summary:  Orders Placed This Encounter    Fecal leukocytes    Giardia antigen    Enteric Pathogen Panel    Ceruloplasmin    Hepatitis B Core Antibody, IgM    DYLLAN EIA w/ Reflex to dsDNA/CHARISMA    Alpha-1-Antitrypsin    Hepatitis C Antibody    Hepatitis B Surface Ab, Qualitative    Hepatitis B Surface Antigen    Ferritin    Iron and TIBC    Protein Electrophoresis, Serum with Reflex NICKO    CBC Auto Differential    Comprehensive Metabolic Panel    Protime-INR    Antimitochondrial Antibody    Anti-Smooth Muscle Antibody    IgG    IgM    IgA    Endomysial Antibodies, IgG    Fecal fat, qualitative    Occult blood x 1, stool    Calprotectin, Stool    Pancreatic elastase, fecal    EGD    EGD       Thank you for allowing me to participate in the care of James Mcclellan.      JUAN ALBERTO Coreas          "

## 2025-08-07 ENCOUNTER — TELEPHONE (OUTPATIENT)
Dept: GASTROENTEROLOGY | Facility: CLINIC | Age: 60
End: 2025-08-07
Payer: OTHER GOVERNMENT

## 2025-08-07 NOTE — TELEPHONE ENCOUNTER
Results and recommendations called to patient. Verbalized understanding.              ----- Message from ANDREA Carrizales sent at 8/6/2025 12:34 PM CDT -----  At the time of the office visit yesterday, patient's blood sugar was 435.  Glucose is not controlled.  He needs to see primary care as soon as possible.  Platelet count is decreased.  Bilirubin is   also elevated this is consistent with diagnosis of cirrhosis.  No immunity to hepatitis-B.  Labs will be further reviewed at office visit.  MELD 3.0: 11 at 8/5/2025  2:32 PM  MELD-Na: 10 at 8/5/2025  2:32 PM  Calculated from:  Serum Creatinine: 0.99 mg/dL (Using min of 1 mg/dL) at 8/5/2025  2:32 PM  Serum Sodium: 136 mmol/L at 8/5/2025  2:32 PM  Total Bilirubin: 2.2 mg/dL at 8/5/2025  2:32 PM  Serum Albumin: 4 g/dL (Using max of 3.5 g/dL) at 8/5/2025  2:32 PM  INR(ratio): 1.07 at 8/5/2025  2:32 PM  Age at listing (hypothetical): 59 years  Sex: Male at 8/5/2025  2:32 PM      ----- Message -----  From: Lab, Background User  Sent: 8/5/2025   3:08 PM CDT  To: ANDREA Jordan     Voice mail left for patient. Please ask patient if she has enough medication until provider returns on 7/2/18

## 2025-08-18 ENCOUNTER — ANESTHESIA (OUTPATIENT)
Dept: GASTROENTEROLOGY | Facility: HOSPITAL | Age: 60
End: 2025-08-18
Payer: OTHER GOVERNMENT

## 2025-08-18 ENCOUNTER — ANESTHESIA EVENT (OUTPATIENT)
Dept: GASTROENTEROLOGY | Facility: HOSPITAL | Age: 60
End: 2025-08-18
Payer: OTHER GOVERNMENT

## 2025-08-18 ENCOUNTER — HOSPITAL ENCOUNTER (OUTPATIENT)
Dept: GASTROENTEROLOGY | Facility: HOSPITAL | Age: 60
Discharge: HOME | End: 2025-08-18
Attending: NURSE PRACTITIONER | Admitting: INTERNAL MEDICINE
Payer: OTHER GOVERNMENT

## 2025-08-18 VITALS
BODY MASS INDEX: 30.36 KG/M2 | HEART RATE: 57 BPM | WEIGHT: 205 LBS | TEMPERATURE: 98 F | DIASTOLIC BLOOD PRESSURE: 74 MMHG | RESPIRATION RATE: 12 BRPM | OXYGEN SATURATION: 97 % | HEIGHT: 69 IN | SYSTOLIC BLOOD PRESSURE: 128 MMHG

## 2025-08-18 DIAGNOSIS — K21.9 GASTROESOPHAGEAL REFLUX DISEASE, UNSPECIFIED WHETHER ESOPHAGITIS PRESENT: ICD-10-CM

## 2025-08-18 DIAGNOSIS — K74.60 HEPATIC CIRRHOSIS, UNSPECIFIED HEPATIC CIRRHOSIS TYPE, UNSPECIFIED WHETHER ASCITES PRESENT: ICD-10-CM

## 2025-08-18 DIAGNOSIS — K29.60 EROSIVE GASTRITIS: Primary | ICD-10-CM

## 2025-08-18 DIAGNOSIS — K29.60 EROSIVE GASTRITIS: ICD-10-CM

## 2025-08-18 DIAGNOSIS — R13.19 ESOPHAGEAL DYSPHAGIA: Primary | ICD-10-CM

## 2025-08-18 LAB — GLUCOSE SERPL-MCNC: 185 MG/DL (ref 70–105)

## 2025-08-18 PROCEDURE — 43450 DILATE ESOPHAGUS 1/MULT PASS: CPT | Mod: 51,,, | Performed by: INTERNAL MEDICINE

## 2025-08-18 PROCEDURE — 27000284 HC CANNULA NASAL: Performed by: NURSE ANESTHETIST, CERTIFIED REGISTERED

## 2025-08-18 PROCEDURE — 37000008 HC ANESTHESIA 1ST 15 MINUTES: Performed by: INTERNAL MEDICINE

## 2025-08-18 PROCEDURE — 88305 TISSUE EXAM BY PATHOLOGIST: CPT | Mod: 26,,, | Performed by: PATHOLOGY

## 2025-08-18 PROCEDURE — 88342 IMHCHEM/IMCYTCHM 1ST ANTB: CPT | Mod: 26,,, | Performed by: PATHOLOGY

## 2025-08-18 PROCEDURE — 27201423 OPTIME MED/SURG SUP & DEVICES STERILE SUPPLY: Performed by: INTERNAL MEDICINE

## 2025-08-18 PROCEDURE — 88305 TISSUE EXAM BY PATHOLOGIST: CPT | Mod: TC,SUR | Performed by: INTERNAL MEDICINE

## 2025-08-18 PROCEDURE — 82962 GLUCOSE BLOOD TEST: CPT

## 2025-08-18 PROCEDURE — 63600175 PHARM REV CODE 636 W HCPCS: Performed by: NURSE ANESTHETIST, CERTIFIED REGISTERED

## 2025-08-18 PROCEDURE — 43450 DILATE ESOPHAGUS 1/MULT PASS: CPT | Performed by: INTERNAL MEDICINE

## 2025-08-18 PROCEDURE — 43239 EGD BIOPSY SINGLE/MULTIPLE: CPT | Mod: ,,, | Performed by: INTERNAL MEDICINE

## 2025-08-18 PROCEDURE — 43239 EGD BIOPSY SINGLE/MULTIPLE: CPT | Performed by: INTERNAL MEDICINE

## 2025-08-18 RX ORDER — SODIUM CHLORIDE 0.9 % (FLUSH) 0.9 %
10 SYRINGE (ML) INJECTION EVERY 6 HOURS PRN
Status: DISCONTINUED | OUTPATIENT
Start: 2025-08-18 | End: 2025-08-19 | Stop reason: HOSPADM

## 2025-08-18 RX ORDER — LIDOCAINE HYDROCHLORIDE 20 MG/ML
INJECTION, SOLUTION EPIDURAL; INFILTRATION; INTRACAUDAL; PERINEURAL
Status: DISCONTINUED | OUTPATIENT
Start: 2025-08-18 | End: 2025-08-18

## 2025-08-18 RX ORDER — PROPOFOL 10 MG/ML
VIAL (ML) INTRAVENOUS
Status: DISCONTINUED | OUTPATIENT
Start: 2025-08-18 | End: 2025-08-18

## 2025-08-18 RX ORDER — PANTOPRAZOLE SODIUM 40 MG/1
40 TABLET, DELAYED RELEASE ORAL DAILY
Qty: 90 TABLET | Refills: 3 | Status: SHIPPED | OUTPATIENT
Start: 2025-08-18 | End: 2025-08-18 | Stop reason: SDUPTHER

## 2025-08-18 RX ORDER — SODIUM CHLORIDE, SODIUM LACTATE, POTASSIUM CHLORIDE, CALCIUM CHLORIDE 600; 310; 30; 20 MG/100ML; MG/100ML; MG/100ML; MG/100ML
INJECTION, SOLUTION INTRAVENOUS CONTINUOUS
Status: DISCONTINUED | OUTPATIENT
Start: 2025-08-18 | End: 2025-08-19 | Stop reason: HOSPADM

## 2025-08-18 RX ADMIN — PROPOFOL 130 MG: 10 INJECTION, EMULSION INTRAVENOUS at 09:08

## 2025-08-18 RX ADMIN — PROPOFOL 50 MG: 10 INJECTION, EMULSION INTRAVENOUS at 09:08

## 2025-08-18 RX ADMIN — LIDOCAINE HYDROCHLORIDE 100 MG: 20 INJECTION, SOLUTION EPIDURAL; INFILTRATION; INTRACAUDAL; PERINEURAL at 09:08

## 2025-08-19 LAB
ESTROGEN SERPL-MCNC: NORMAL PG/ML
INSULIN SERPL-ACNC: NORMAL U[IU]/ML
LAB AP GROSS DESCRIPTION: NORMAL
LAB AP LABORATORY NOTES: NORMAL
T3RU NFR SERPL: NORMAL %

## 2025-08-19 RX ORDER — PANTOPRAZOLE SODIUM 40 MG/1
40 TABLET, DELAYED RELEASE ORAL DAILY
Qty: 90 TABLET | Refills: 3 | Status: SHIPPED | OUTPATIENT
Start: 2025-08-19 | End: 2026-08-19

## 2025-09-03 ENCOUNTER — HOSPITAL ENCOUNTER (OUTPATIENT)
Dept: RADIOLOGY | Facility: HOSPITAL | Age: 60
Discharge: HOME | End: 2025-09-03
Payer: OTHER GOVERNMENT

## 2025-09-03 DIAGNOSIS — M54.9 DORSALGIA, UNSPECIFIED: ICD-10-CM

## 2025-09-03 PROCEDURE — 72148 MRI LUMBAR SPINE W/O DYE: CPT | Mod: 26,,, | Performed by: RADIOLOGY

## 2025-09-03 PROCEDURE — 72148 MRI LUMBAR SPINE W/O DYE: CPT | Mod: TC
